# Patient Record
Sex: MALE | Race: WHITE | Employment: FULL TIME | URBAN - NONMETROPOLITAN AREA
[De-identification: names, ages, dates, MRNs, and addresses within clinical notes are randomized per-mention and may not be internally consistent; named-entity substitution may affect disease eponyms.]

---

## 2017-03-02 DIAGNOSIS — E23.0 HYPOGONADOTROPIC HYPOGONADISM SYNDROME, MALE (HCC): Primary | ICD-10-CM

## 2017-03-17 ENCOUNTER — TELEPHONE (OUTPATIENT)
Dept: UROLOGY | Age: 37
End: 2017-03-17

## 2017-03-27 ENCOUNTER — TELEPHONE (OUTPATIENT)
Dept: UROLOGY | Age: 37
End: 2017-03-27

## 2017-03-28 ENCOUNTER — TELEPHONE (OUTPATIENT)
Dept: UROLOGY | Age: 37
End: 2017-03-28

## 2017-04-24 ENCOUNTER — TELEPHONE (OUTPATIENT)
Dept: UROLOGY | Age: 37
End: 2017-04-24

## 2017-04-28 ENCOUNTER — OFFICE VISIT (OUTPATIENT)
Dept: UROLOGY | Age: 37
End: 2017-04-28

## 2017-04-28 VITALS
DIASTOLIC BLOOD PRESSURE: 82 MMHG | HEIGHT: 70 IN | BODY MASS INDEX: 35.79 KG/M2 | WEIGHT: 250 LBS | SYSTOLIC BLOOD PRESSURE: 124 MMHG

## 2017-04-28 DIAGNOSIS — E29.1 HYPOGONADISM IN MALE: Primary | ICD-10-CM

## 2017-04-28 LAB
BILIRUBIN URINE: NEGATIVE
BLOOD URINE, POC: NEGATIVE
CHARACTER, URINE: CLEAR
COLOR, URINE: YELLOW
GLUCOSE URINE: NEGATIVE MG/DL
KETONES, URINE: NEGATIVE
LEUKOCYTE CLUMPS, URINE: NEGATIVE
NITRITE, URINE: NEGATIVE
PH, URINE: 7
PROTEIN, URINE: NEGATIVE MG/DL
SPECIFIC GRAVITY, URINE: 1.02 (ref 1–1.03)
UROBILINOGEN, URINE: 0.2 EU/DL

## 2017-04-28 PROCEDURE — 99213 OFFICE O/P EST LOW 20 MIN: CPT | Performed by: UROLOGY

## 2017-04-28 PROCEDURE — 81003 URINALYSIS AUTO W/O SCOPE: CPT | Performed by: UROLOGY

## 2017-05-30 RX ORDER — TESTOSTERONE GEL, 1% 10 MG/G
GEL TRANSDERMAL
Refills: 0 | OUTPATIENT
Start: 2017-05-30

## 2017-06-01 ENCOUNTER — TELEPHONE (OUTPATIENT)
Dept: UROLOGY | Age: 37
End: 2017-06-01

## 2017-06-06 RX ORDER — TESTOSTERONE GEL, 1% 10 MG/G
GEL TRANSDERMAL
Qty: 30 TUBE | Refills: 5 | Status: SHIPPED | OUTPATIENT
Start: 2017-06-06 | End: 2018-03-07

## 2017-07-03 ENCOUNTER — OFFICE VISIT (OUTPATIENT)
Dept: ENDOCRINOLOGY | Age: 37
End: 2017-07-03

## 2017-07-03 VITALS
HEIGHT: 70 IN | SYSTOLIC BLOOD PRESSURE: 118 MMHG | WEIGHT: 265 LBS | HEART RATE: 112 BPM | RESPIRATION RATE: 18 BRPM | BODY MASS INDEX: 37.94 KG/M2 | DIASTOLIC BLOOD PRESSURE: 80 MMHG

## 2017-07-03 DIAGNOSIS — E29.1 HYPOGONADISM IN MALE: Primary | ICD-10-CM

## 2017-07-03 PROCEDURE — 99204 OFFICE O/P NEW MOD 45 MIN: CPT | Performed by: INTERNAL MEDICINE

## 2017-07-03 RX ORDER — TESTOSTERONE CYPIONATE 200 MG/ML
200 INJECTION INTRAMUSCULAR
Qty: 4 ML | Refills: 1 | Status: SHIPPED | OUTPATIENT
Start: 2017-07-03 | End: 2018-10-02

## 2017-07-20 LAB
ABSOLUTE BASO #: 0 K/UL (ref 0–0.1)
ABSOLUTE EOS #: 0.2 K/UL (ref 0.1–0.4)
ABSOLUTE LYMPH #: 2.1 K/UL (ref 0.8–5.2)
ABSOLUTE MONO #: 0.6 K/UL (ref 0.1–0.9)
ABSOLUTE NEUT #: 4 K/UL (ref 1.3–9.1)
ALBUMIN SERPL-MCNC: 4.7 G/DL (ref 3.2–5.3)
ALK PHOSPHATASE: 79 IU/L (ref 35–121)
ALT SERPL-CCNC: 46 IU/L (ref 5–59)
ANION GAP SERPL CALCULATED.3IONS-SCNC: 11 MMOL/L
AST SERPL-CCNC: 29 IU/L (ref 10–42)
BASOPHILS RELATIVE PERCENT: 0.4 %
BILIRUB SERPL-MCNC: 1.1 MG/DL (ref 0.2–1.3)
BUN BLDV-MCNC: 16 MG/DL (ref 10–20)
CALCIUM SERPL-MCNC: 10.1 MG/DL (ref 8.7–10.8)
CHLORIDE BLD-SCNC: 105 MMOL/L (ref 95–111)
CHOLESTEROL/HDL RATIO: 5.3
CHOLESTEROL: 175 MG/DL
CO2: 25 MMOL/L (ref 21–32)
CREAT SERPL-MCNC: 0.8 MG/DL (ref 0.5–1.3)
EGFR AFRICAN AMERICAN: 132
EGFR IF NONAFRICAN AMERICAN: 109
EOSINOPHILS RELATIVE PERCENT: 2.6 %
FOLLICLE STIMULATING HORMONE: 3.7 MIU/ML
GLUCOSE: 82 MG/DL (ref 70–100)
HCT VFR BLD CALC: 41.1 % (ref 41.4–51)
HDLC SERPL-MCNC: 33 MG/DL (ref 40–60)
HEMOGLOBIN: 14.1 G/DL (ref 13.8–17)
LDL CHOLESTEROL CALCULATED: 66 MG/DL
LDL/HDL RATIO: 2
LH: 1 MIU/ML
LYMPHOCYTE %: 30.8 %
MCH RBC QN AUTO: 27.7 PG (ref 27–34)
MCHC RBC AUTO-ENTMCNC: 34.3 G/DL (ref 31–36)
MCV RBC AUTO: 80.7 FL (ref 80–100)
MONOCYTES # BLD: 8.5 %
NEUTROPHILS RELATIVE PERCENT: 57 %
PDW BLD-RTO: 13 % (ref 10.8–14.8)
PLATELETS: 242 K/UL (ref 150–450)
POTASSIUM SERPL-SCNC: 4.1 MMOL/L (ref 3.5–5.4)
PROLACTIN: 8.2 NG/ML (ref 2.5–17)
PSA, ULTRASENSITIVE: 0.2 NG/ML
RBC: 5.09 M/UL (ref 4–5.5)
SODIUM BLD-SCNC: 137 MMOL/L (ref 134–147)
T4 FREE: 1.01 NG/DL (ref 0.8–1.8)
TOTAL PROTEIN: 7.5 G/DL (ref 5.8–8)
TRIGL SERPL-MCNC: 381 MG/DL
TSH SERPL DL<=0.05 MIU/L-ACNC: 1.9 UIU/ML (ref 0.4–4.4)
VLDLC SERPL CALC-MCNC: 76 MG/DL
WBC: 7 K/UL (ref 3.7–10.8)

## 2017-07-21 LAB — SEX HORMONE BINDING GLOBULIN: 6.7 NMOL/L (ref 16.5–55.9)

## 2017-07-27 LAB — TESTOSTERONE FREE-MALE: 5.1 PG/ML (ref 47–244)

## 2018-02-28 ENCOUNTER — TELEPHONE (OUTPATIENT)
Dept: UROLOGY | Age: 38
End: 2018-02-28

## 2018-02-28 NOTE — TELEPHONE ENCOUNTER
Called and spoke with patient wife (on HIPPA) informed that patient needs an appt first- appt made with [facility] in [place] office.

## 2018-03-07 ENCOUNTER — OFFICE VISIT (OUTPATIENT)
Dept: UROLOGY | Age: 38
End: 2018-03-07
Payer: COMMERCIAL

## 2018-03-07 VITALS
DIASTOLIC BLOOD PRESSURE: 68 MMHG | BODY MASS INDEX: 40.29 KG/M2 | SYSTOLIC BLOOD PRESSURE: 114 MMHG | HEIGHT: 69 IN | WEIGHT: 272 LBS

## 2018-03-07 DIAGNOSIS — E29.1 HYPOGONADISM IN MALE: Primary | ICD-10-CM

## 2018-03-07 LAB
BILIRUBIN URINE: NEGATIVE
BLOOD URINE, POC: NEGATIVE
CHARACTER, URINE: CLEAR
COLOR, URINE: YELLOW
GLUCOSE URINE: NEGATIVE MG/DL
KETONES, URINE: NEGATIVE
LEUKOCYTE CLUMPS, URINE: NEGATIVE
NITRITE, URINE: NEGATIVE
PH, URINE: 5.5
PROTEIN, URINE: NEGATIVE MG/DL
SPECIFIC GRAVITY, URINE: 1.02 (ref 1–1.03)
UROBILINOGEN, URINE: 0.2 EU/DL

## 2018-03-07 PROCEDURE — 81003 URINALYSIS AUTO W/O SCOPE: CPT | Performed by: NURSE PRACTITIONER

## 2018-03-07 PROCEDURE — 99213 OFFICE O/P EST LOW 20 MIN: CPT | Performed by: NURSE PRACTITIONER

## 2018-03-07 RX ORDER — TESTOSTERONE 16.2 MG/G
1 GEL TRANSDERMAL DAILY
Qty: 1 BOTTLE | Refills: 3 | Status: SHIPPED | OUTPATIENT
Start: 2018-03-07 | End: 2018-03-20

## 2018-03-07 ASSESSMENT — ENCOUNTER SYMPTOMS
NAUSEA: 0
ABDOMINAL PAIN: 0
VOMITING: 0

## 2018-03-08 ENCOUNTER — TELEPHONE (OUTPATIENT)
Dept: UROLOGY | Age: 38
End: 2018-03-08

## 2018-03-08 NOTE — TELEPHONE ENCOUNTER
Parris Nair called stating his insurance is requiring a prior authorization for the testosterone gel and asked if someone could inform him when this has been approved. Thank you!

## 2018-03-09 NOTE — TELEPHONE ENCOUNTER
Sandoval Dress calls to see if there has been any response or movement on this prior authorization. Please contact patient.

## 2018-03-14 NOTE — TELEPHONE ENCOUNTER
PA initiated again due to [employer] [employer] canceled transaction due to inactivity. Answers on the covermymed page must be answered within 24 hours. I was on vacation during this time.

## 2018-03-19 ENCOUNTER — TELEPHONE (OUTPATIENT)
Dept: UROLOGY | Age: 38
End: 2018-03-19

## 2018-03-19 NOTE — TELEPHONE ENCOUNTER
Patient is calling back in about his injections, please advise at [phone], please leave message if no answer

## 2018-03-20 ENCOUNTER — TELEPHONE (OUTPATIENT)
Dept: UROLOGY | Age: 38
End: 2018-03-20

## 2018-03-20 RX ORDER — TESTOSTERONE CYPIONATE 200 MG/ML
100 INJECTION INTRAMUSCULAR
Qty: 2 ML | Refills: 5 | Status: SHIPPED | OUTPATIENT
Start: 2018-03-20 | End: 2018-10-01 | Stop reason: SDUPTHER

## 2018-03-20 RX ORDER — SYRINGE W-NEEDLE,DISPOSAB,3 ML 25GX5/8"
1 SYRINGE, EMPTY DISPOSABLE MISCELLANEOUS
Qty: 6 EACH | Refills: 1 | Status: SHIPPED | OUTPATIENT
Start: 2018-03-20 | End: 2019-04-25

## 2018-03-20 RX ORDER — SYRINGE W-NEEDLE,DISPOSAB,3 ML 25GX5/8"
1 SYRINGE, EMPTY DISPOSABLE MISCELLANEOUS
Qty: 6 EACH | Refills: 1 | Status: SHIPPED | OUTPATIENT
Start: 2018-03-20 | End: 2018-03-20

## 2018-03-20 NOTE — TELEPHONE ENCOUNTER
I accidentally sent to [employer] because that was in here. Please call there and cancel. I sent to [facility].

## 2018-03-20 NOTE — TELEPHONE ENCOUNTER
[facility],    Patient is asking for prescription for testosterone injections. He would like it sent to [address] [place] [address] in [place]. Please advise, thank you.

## 2018-05-24 ENCOUNTER — HOSPITAL ENCOUNTER (OUTPATIENT)
Age: 38
Discharge: HOME | End: 2018-05-24
Payer: COMMERCIAL

## 2018-05-24 DIAGNOSIS — E29.1 HYPOGONADISM IN MALE: ICD-10-CM

## 2018-05-24 LAB
HCT VFR BLD CALC: 42 % (ref 42–52)
HEMOGLOBIN: 14.4 GM/DL (ref 14–18)
PROSTATE SPECIFIC ANTIGEN: 0.54 NG/ML (ref 0–1)

## 2018-05-24 PROCEDURE — 84403 ASSAY OF TOTAL TESTOSTERONE: CPT

## 2018-05-24 PROCEDURE — 84153 ASSAY OF PSA TOTAL: CPT

## 2018-05-24 PROCEDURE — 36415 COLL VENOUS BLD VENIPUNCTURE: CPT

## 2018-05-24 PROCEDURE — 85018 HEMOGLOBIN: CPT

## 2018-05-24 PROCEDURE — 85014 HEMATOCRIT: CPT

## 2018-05-24 PROCEDURE — 84270 ASSAY OF SEX HORMONE GLOBUL: CPT

## 2018-05-25 LAB — TESTOSTERONE FREE: NORMAL

## 2018-05-31 ENCOUNTER — OFFICE VISIT (OUTPATIENT)
Dept: UROLOGY | Age: 38
End: 2018-05-31
Payer: COMMERCIAL

## 2018-05-31 VITALS
SYSTOLIC BLOOD PRESSURE: 128 MMHG | BODY MASS INDEX: 39.3 KG/M2 | HEIGHT: 70 IN | DIASTOLIC BLOOD PRESSURE: 76 MMHG | WEIGHT: 274.5 LBS

## 2018-05-31 DIAGNOSIS — E29.1 HYPOGONADISM IN MALE: Primary | ICD-10-CM

## 2018-05-31 LAB
BILIRUBIN URINE: NEGATIVE
BLOOD URINE, POC: NEGATIVE
CHARACTER, URINE: CLEAR
COLOR, URINE: YELLOW
GLUCOSE URINE: NEGATIVE MG/DL
KETONES, URINE: ABNORMAL
LEUKOCYTE CLUMPS, URINE: NEGATIVE
NITRITE, URINE: NEGATIVE
PH, URINE: 5
PROTEIN, URINE: 100 MG/DL
SPECIFIC GRAVITY, URINE: >= 1.03 (ref 1–1.03)
UROBILINOGEN, URINE: 0.2 EU/DL

## 2018-05-31 PROCEDURE — 99213 OFFICE O/P EST LOW 20 MIN: CPT | Performed by: NURSE PRACTITIONER

## 2018-05-31 PROCEDURE — 81003 URINALYSIS AUTO W/O SCOPE: CPT | Performed by: NURSE PRACTITIONER

## 2018-05-31 ASSESSMENT — ENCOUNTER SYMPTOMS
VOMITING: 0
ABDOMINAL PAIN: 0
NAUSEA: 0

## 2018-08-16 ENCOUNTER — OFFICE VISIT (OUTPATIENT)
Dept: FAMILY MEDICINE CLINIC | Age: 38
End: 2018-08-16
Payer: COMMERCIAL

## 2018-08-16 VITALS
HEIGHT: 71 IN | WEIGHT: 273 LBS | SYSTOLIC BLOOD PRESSURE: 126 MMHG | DIASTOLIC BLOOD PRESSURE: 62 MMHG | OXYGEN SATURATION: 97 % | HEART RATE: 88 BPM | BODY MASS INDEX: 38.22 KG/M2

## 2018-08-16 DIAGNOSIS — E23.0 HYPOGONADOTROPIC HYPOGONADISM SYNDROME, MALE (HCC): Primary | ICD-10-CM

## 2018-08-16 DIAGNOSIS — E78.2 MIXED HYPERLIPIDEMIA: ICD-10-CM

## 2018-08-16 PROCEDURE — 99204 OFFICE O/P NEW MOD 45 MIN: CPT | Performed by: FAMILY MEDICINE

## 2018-08-16 PROCEDURE — 1036F TOBACCO NON-USER: CPT | Performed by: FAMILY MEDICINE

## 2018-08-16 PROCEDURE — G8417 CALC BMI ABV UP PARAM F/U: HCPCS | Performed by: FAMILY MEDICINE

## 2018-08-16 PROCEDURE — G8427 DOCREV CUR MEDS BY ELIG CLIN: HCPCS | Performed by: FAMILY MEDICINE

## 2018-08-16 ASSESSMENT — ENCOUNTER SYMPTOMS
CONSTIPATION: 0
WHEEZING: 0
ABDOMINAL PAIN: 0
RHINORRHEA: 0
DIARRHEA: 0
SORE THROAT: 0
EYE DISCHARGE: 0
COUGH: 0
SHORTNESS OF BREATH: 0
NAUSEA: 0

## 2018-08-16 ASSESSMENT — PATIENT HEALTH QUESTIONNAIRE - PHQ9
2. FEELING DOWN, DEPRESSED OR HOPELESS: 0
SUM OF ALL RESPONSES TO PHQ9 QUESTIONS 1 & 2: 0
SUM OF ALL RESPONSES TO PHQ QUESTIONS 1-9: 0
1. LITTLE INTEREST OR PLEASURE IN DOING THINGS: 0
SUM OF ALL RESPONSES TO PHQ QUESTIONS 1-9: 0

## 2018-08-16 NOTE — PROGRESS NOTES
Juli Garcia  [address] [place] [address]  Dept: [phone]  Dept Fax: [phone]  Loc: [phone]    Isac Arreola is a 41 y.o. male who presents today for his medical conditions/complaints as noted below. Chief Complaint   Patient presents with    New Patient           HPI:     Patient presents to [facility]. Has history of low testosterone and sees [facility] for this. Is on testosterone injections. States this is tolerated well. Patient also has a history of elevated triglycerides but has not been checked in over a year and is never been on medication for it. Denies any chest pain or shortness of breath. Denies any depression or anxiety. Has 6 adopted children. States he feels well without complaints today. Past Medical History:   Diagnosis Date    Azoospermia     Hypogonadism       No past surgical history on file. Family History   Problem Relation Age of Onset    Cancer Mother         hodgkins    No Known Problems Father        Social History   Substance Use Topics    Smoking status: Never Smoker    Smokeless tobacco: Never Used    Alcohol use No      Current Outpatient Prescriptions   Medication Sig Dispense Refill    testosterone cypionate (DEPOTESTOTERONE CYPIONATE) 200 MG/ML injection Inject 0.5 mLs into the muscle every 14 days. 2 mL 5    Syringe/Needle, Disp, (SYRINGE 3CC/25GX1\") 25G X 1\" 3 ML MISC 1 each by Does not apply route every 14 days 6 each 1    testosterone cypionate (DEPOTESTOTERONE CYPIONATE) 200 MG/ML injection Inject 1 mL into the muscle every 14 days First injection due on 7/10/17 4 mL 1    Needles & Syringes MISC 1 each by Does not apply route daily 3 ml syringes 20 each 1     No current facility-administered medications for this visit.       No Known Allergies    Health Maintenance   Topic Date Due    HIV screen  04/08/1996    DTaP/Tdap/Td vaccine (1 - Tdap) 04/08/2000    Flu vaccine (1) 09/01/2018       Subjective:      Review of Systems   Constitutional: Negative for chills, fatigue and fever. HENT: Negative for congestion, rhinorrhea and sore throat. Eyes: Negative for discharge and visual disturbance. Respiratory: Negative for cough, shortness of breath and wheezing. Cardiovascular: Negative for chest pain and palpitations. Gastrointestinal: Negative for abdominal pain, constipation, diarrhea and nausea. Genitourinary: Negative for dysuria and hematuria. Musculoskeletal: Negative for arthralgias and myalgias. Neurological: Negative for dizziness and headaches. Psychiatric/Behavioral: Negative for sleep disturbance. The patient is not nervous/anxious. Objective:     Physical Exam   Constitutional: He is oriented to person, place, and time. He appears well-developed and well-nourished. No distress. HENT:   Head: Normocephalic and atraumatic. Right Ear: Hearing, tympanic membrane, external ear and ear canal normal.   Left Ear: Hearing, tympanic membrane, external ear and ear canal normal.   Nose: Right sinus exhibits no maxillary sinus tenderness and no frontal sinus tenderness. Left sinus exhibits no maxillary sinus tenderness and no frontal sinus tenderness. Mouth/Throat: Oropharynx is clear and moist and mucous membranes are normal. No oropharyngeal exudate, posterior oropharyngeal edema or posterior oropharyngeal erythema. Eyes: Pupils are equal, round, and reactive to light. Conjunctivae and EOM are normal. Right eye exhibits no discharge. Left eye exhibits no discharge. No scleral icterus. Neck: Normal range of motion. Neck supple. Cardiovascular: Normal rate, regular rhythm, normal heart sounds and intact distal pulses. Pulmonary/Chest: Effort normal and breath sounds normal. No respiratory distress. He has no wheezes. Abdominal: Soft. Bowel sounds are normal. He exhibits no distension. There is no tenderness. Patient given educational materials - see patient instructions. Discussed use, benefit, and side effects of prescribed medications. All patient questions answered. Pt voiced understanding. Reviewed health maintenance. Instructed to continue current medications, diet and exercise. Patient agreed with treatment plan. Follow up as directed.      Electronically signed by Sloane Javier MD on 8/16/2018 at 8:52 AM

## 2018-10-01 DIAGNOSIS — E29.1 HYPOGONADISM IN MALE: Primary | ICD-10-CM

## 2018-10-02 RX ORDER — TESTOSTERONE CYPIONATE 200 MG/ML
100 INJECTION INTRAMUSCULAR
Qty: 2 ML | Refills: 3 | Status: SHIPPED | OUTPATIENT
Start: 2018-10-02 | End: 2019-04-24 | Stop reason: SDUPTHER

## 2018-11-30 ENCOUNTER — HOSPITAL ENCOUNTER (OUTPATIENT)
Age: 38
Discharge: HOME | End: 2018-11-30
Payer: COMMERCIAL

## 2018-11-30 DIAGNOSIS — E29.1 HYPOGONADISM IN MALE: ICD-10-CM

## 2018-11-30 LAB
HCT VFR BLD CALC: 42.3 % (ref 42–52)
HEMOGLOBIN: 14.1 GM/DL (ref 14–18)

## 2018-11-30 PROCEDURE — 85018 HEMOGLOBIN: CPT

## 2018-11-30 PROCEDURE — 36415 COLL VENOUS BLD VENIPUNCTURE: CPT

## 2018-11-30 PROCEDURE — 84403 ASSAY OF TOTAL TESTOSTERONE: CPT

## 2018-11-30 PROCEDURE — 85014 HEMATOCRIT: CPT

## 2018-12-02 LAB — TESTOSTERONE TOTAL: 57 NG/DL (ref 300–1080)

## 2018-12-14 ENCOUNTER — OFFICE VISIT (OUTPATIENT)
Dept: UROLOGY | Age: 38
End: 2018-12-14
Payer: COMMERCIAL

## 2018-12-14 VITALS
BODY MASS INDEX: 38.07 KG/M2 | DIASTOLIC BLOOD PRESSURE: 78 MMHG | SYSTOLIC BLOOD PRESSURE: 118 MMHG | HEIGHT: 70 IN | WEIGHT: 265.9 LBS

## 2018-12-14 DIAGNOSIS — E23.0 HYPOGONADOTROPIC HYPOGONADISM SYNDROME, MALE (HCC): Primary | ICD-10-CM

## 2018-12-14 PROCEDURE — G8484 FLU IMMUNIZE NO ADMIN: HCPCS | Performed by: NURSE PRACTITIONER

## 2018-12-14 PROCEDURE — 99213 OFFICE O/P EST LOW 20 MIN: CPT | Performed by: NURSE PRACTITIONER

## 2018-12-14 PROCEDURE — 81003 URINALYSIS AUTO W/O SCOPE: CPT | Performed by: NURSE PRACTITIONER

## 2018-12-14 PROCEDURE — G8427 DOCREV CUR MEDS BY ELIG CLIN: HCPCS | Performed by: NURSE PRACTITIONER

## 2018-12-14 PROCEDURE — 1036F TOBACCO NON-USER: CPT | Performed by: NURSE PRACTITIONER

## 2018-12-14 PROCEDURE — G8417 CALC BMI ABV UP PARAM F/U: HCPCS | Performed by: NURSE PRACTITIONER

## 2018-12-14 ASSESSMENT — ENCOUNTER SYMPTOMS
VOMITING: 0
NAUSEA: 0
ABDOMINAL PAIN: 0

## 2018-12-14 NOTE — PROGRESS NOTES
Urinalysis No Micro (Auto)   Result Value Ref Range    Glucose, Ur Negative NEGATIVE mg/dl    Bilirubin Urine Negative     Ketones, Urine Negative NEGATIVE    Specific Gravity, Urine 1.025 1.002 - 1.03    Blood, UA POC Negative NEGATIVE    pH, Urine 5.50 5.0 - 9.0    Protein, Urine Negative NEGATIVE mg/dl    Urobilinogen, Urine 0.20 0.0 - 1.0 eu/dl    Nitrite, Urine Negative NEGATIVE    Leukocyte Clumps, Urine Negative NEGATIVE    Color, Urine Yellow YELLOW-STR    Character, Urine Clear [id number]       Assessment:      Hypogonadism      Plan:      Testosterone level is very low. He reports that he often forgets to do the injection and is not on a good schedule. He wishes to do the androgel or testim like he was on previously, however, his insurance has refused to cover these. He is getting new insurance in 2019 and will check if testim is covered and let us know. Continue testosterone 0.5 ml (100 mg) every 2 weeks. Follow-up in 6 months with labs prior.

## 2019-01-04 ENCOUNTER — APPOINTMENT (OUTPATIENT)
Dept: CT IMAGING | Age: 39
DRG: 871 | End: 2019-01-04
Payer: COMMERCIAL

## 2019-01-04 ENCOUNTER — HOSPITAL ENCOUNTER (INPATIENT)
Age: 39
LOS: 2 days | Discharge: HOME | DRG: 871 | End: 2019-01-06
Attending: EMERGENCY MEDICINE | Admitting: INTERNAL MEDICINE
Payer: COMMERCIAL

## 2019-01-04 ENCOUNTER — APPOINTMENT (OUTPATIENT)
Dept: INTERVENTIONAL RADIOLOGY/VASCULAR | Age: 39
DRG: 871 | End: 2019-01-04
Payer: COMMERCIAL

## 2019-01-04 ENCOUNTER — APPOINTMENT (OUTPATIENT)
Dept: GENERAL RADIOLOGY | Age: 39
DRG: 871 | End: 2019-01-04
Payer: COMMERCIAL

## 2019-01-04 DIAGNOSIS — J18.9 PNEUMONIA DUE TO ORGANISM: Primary | ICD-10-CM

## 2019-01-04 DIAGNOSIS — R06.00 DYSPNEA, UNSPECIFIED TYPE: ICD-10-CM

## 2019-01-04 DIAGNOSIS — I26.99 OTHER ACUTE PULMONARY EMBOLISM WITHOUT ACUTE COR PULMONALE (HCC): ICD-10-CM

## 2019-01-04 DIAGNOSIS — R00.0 TACHYCARDIA: ICD-10-CM

## 2019-01-04 LAB
ANION GAP SERPL CALCULATED.3IONS-SCNC: 14 MEQ/L (ref 8–16)
BASOPHILS # BLD: 0.4 %
BASOPHILS ABSOLUTE: 0 THOU/MM3 (ref 0–0.1)
BUN BLDV-MCNC: 9 MG/DL (ref 7–22)
CALCIUM SERPL-MCNC: 9.6 MG/DL (ref 8.5–10.5)
CHLORIDE BLD-SCNC: 101 MEQ/L (ref 98–111)
CO2: 26 MEQ/L (ref 23–33)
CREAT SERPL-MCNC: 0.9 MG/DL (ref 0.4–1.2)
D-DIMER QUANTITATIVE: 792 NG/ML FEU (ref 0–500)
EKG ATRIAL RATE: 122 BPM
EKG P AXIS: 52 DEGREES
EKG P-R INTERVAL: 138 MS
EKG Q-T INTERVAL: 324 MS
EKG QRS DURATION: 100 MS
EKG QTC CALCULATION (BAZETT): 461 MS
EKG R AXIS: 38 DEGREES
EKG T AXIS: 59 DEGREES
EKG VENTRICULAR RATE: 122 BPM
EOSINOPHIL # BLD: 1.2 %
EOSINOPHILS ABSOLUTE: 0.1 THOU/MM3 (ref 0–0.4)
ERYTHROCYTE [DISTWIDTH] IN BLOOD BY AUTOMATED COUNT: 12.5 % (ref 11.5–14.5)
ERYTHROCYTE [DISTWIDTH] IN BLOOD BY AUTOMATED COUNT: 37.5 FL (ref 35–45)
FLU A ANTIGEN: NEGATIVE
FLU B ANTIGEN: NEGATIVE
GFR SERPL CREATININE-BSD FRML MDRD: > 90 ML/MIN/1.73M2
GLUCOSE BLD-MCNC: 114 MG/DL (ref 70–108)
GROUP A STREP CULTURE, REFLEX: NEGATIVE
HCT VFR BLD CALC: 41.3 % (ref 42–52)
HEMOGLOBIN: 13.9 GM/DL (ref 14–18)
IMMATURE GRANS (ABS): 0.05 THOU/MM3 (ref 0–0.07)
IMMATURE GRANULOCYTES: 0.5 %
LACTIC ACID: 1.9 MMOL/L (ref 0.5–2.2)
LYMPHOCYTES # BLD: 12.5 %
LYMPHOCYTES ABSOLUTE: 1.2 THOU/MM3 (ref 1–4.8)
MCH RBC QN AUTO: 27.7 PG (ref 26–33)
MCHC RBC AUTO-ENTMCNC: 33.7 GM/DL (ref 32.2–35.5)
MCV RBC AUTO: 82.4 FL (ref 80–94)
MONOCYTES # BLD: 12.5 %
MONOCYTES ABSOLUTE: 1.2 THOU/MM3 (ref 0.4–1.3)
NUCLEATED RED BLOOD CELLS: 0 /100 WBC
OSMOLALITY CALCULATION: 280.8 MOSMOL/KG (ref 275–300)
PLATELET # BLD: 264 THOU/MM3 (ref 130–400)
PMV BLD AUTO: 9.4 FL (ref 9.4–12.4)
POTASSIUM SERPL-SCNC: 3.4 MEQ/L (ref 3.5–5.2)
RBC # BLD: 5.01 MILL/MM3 (ref 4.7–6.1)
REFLEX THROAT C + S: NORMAL
SEG NEUTROPHILS: 72.9 %
SEGMENTED NEUTROPHILS ABSOLUTE COUNT: 7.1 THOU/MM3 (ref 1.8–7.7)
SODIUM BLD-SCNC: 141 MEQ/L (ref 135–145)
WBC # BLD: 9.7 THOU/MM3 (ref 4.8–10.8)

## 2019-01-04 PROCEDURE — 87880 STREP A ASSAY W/OPTIC: CPT

## 2019-01-04 PROCEDURE — 87804 INFLUENZA ASSAY W/OPTIC: CPT

## 2019-01-04 PROCEDURE — 71046 X-RAY EXAM CHEST 2 VIEWS: CPT

## 2019-01-04 PROCEDURE — 2709999900 HC NON-CHARGEABLE SUPPLY

## 2019-01-04 PROCEDURE — 2580000003 HC RX 258: Performed by: EMERGENCY MEDICINE

## 2019-01-04 PROCEDURE — 87070 CULTURE OTHR SPECIMN AEROBIC: CPT

## 2019-01-04 PROCEDURE — 93010 ELECTROCARDIOGRAM REPORT: CPT | Performed by: INTERNAL MEDICINE

## 2019-01-04 PROCEDURE — 87040 BLOOD CULTURE FOR BACTERIA: CPT

## 2019-01-04 PROCEDURE — 83605 ASSAY OF LACTIC ACID: CPT

## 2019-01-04 PROCEDURE — 2700000000 HC OXYGEN THERAPY PER DAY

## 2019-01-04 PROCEDURE — 2580000003 HC RX 258: Performed by: INTERNAL MEDICINE

## 2019-01-04 PROCEDURE — 2060000000 HC ICU INTERMEDIATE R&B

## 2019-01-04 PROCEDURE — 94640 AIRWAY INHALATION TREATMENT: CPT

## 2019-01-04 PROCEDURE — 6360000002 HC RX W HCPCS: Performed by: INTERNAL MEDICINE

## 2019-01-04 PROCEDURE — 94760 N-INVAS EAR/PLS OXIMETRY 1: CPT

## 2019-01-04 PROCEDURE — 87449 NOS EACH ORGANISM AG IA: CPT

## 2019-01-04 PROCEDURE — 80048 BASIC METABOLIC PNL TOTAL CA: CPT

## 2019-01-04 PROCEDURE — 6370000000 HC RX 637 (ALT 250 FOR IP): Performed by: EMERGENCY MEDICINE

## 2019-01-04 PROCEDURE — 87899 AGENT NOS ASSAY W/OPTIC: CPT

## 2019-01-04 PROCEDURE — 85379 FIBRIN DEGRADATION QUANT: CPT

## 2019-01-04 PROCEDURE — 71275 CT ANGIOGRAPHY CHEST: CPT

## 2019-01-04 PROCEDURE — 36415 COLL VENOUS BLD VENIPUNCTURE: CPT

## 2019-01-04 PROCEDURE — 85025 COMPLETE CBC W/AUTO DIFF WBC: CPT

## 2019-01-04 PROCEDURE — 93005 ELECTROCARDIOGRAM TRACING: CPT | Performed by: PHYSICIAN ASSISTANT

## 2019-01-04 PROCEDURE — 93970 EXTREMITY STUDY: CPT

## 2019-01-04 PROCEDURE — 6360000002 HC RX W HCPCS: Performed by: EMERGENCY MEDICINE

## 2019-01-04 PROCEDURE — 96365 THER/PROPH/DIAG IV INF INIT: CPT

## 2019-01-04 PROCEDURE — 99285 EMERGENCY DEPT VISIT HI MDM: CPT

## 2019-01-04 PROCEDURE — 6360000004 HC RX CONTRAST MEDICATION: Performed by: EMERGENCY MEDICINE

## 2019-01-04 RX ORDER — SODIUM CHLORIDE 9 MG/ML
INJECTION, SOLUTION INTRAVENOUS CONTINUOUS
Status: DISCONTINUED | OUTPATIENT
Start: 2019-01-04 | End: 2019-01-05

## 2019-01-04 RX ORDER — ACETAMINOPHEN 325 MG/1
650 TABLET ORAL EVERY 4 HOURS PRN
Status: DISCONTINUED | OUTPATIENT
Start: 2019-01-04 | End: 2019-01-04 | Stop reason: SDUPTHER

## 2019-01-04 RX ORDER — ONDANSETRON 2 MG/ML
4 INJECTION INTRAMUSCULAR; INTRAVENOUS EVERY 6 HOURS PRN
Status: DISCONTINUED | OUTPATIENT
Start: 2019-01-04 | End: 2019-01-06 | Stop reason: HOSPADM

## 2019-01-04 RX ORDER — IPRATROPIUM BROMIDE AND ALBUTEROL SULFATE 2.5; .5 MG/3ML; MG/3ML
1 SOLUTION RESPIRATORY (INHALATION) ONCE
Status: COMPLETED | OUTPATIENT
Start: 2019-01-04 | End: 2019-01-04

## 2019-01-04 RX ORDER — ALBUTEROL SULFATE 2.5 MG/3ML
2.5 SOLUTION RESPIRATORY (INHALATION) 4 TIMES DAILY
Status: DISCONTINUED | OUTPATIENT
Start: 2019-01-04 | End: 2019-01-06 | Stop reason: HOSPADM

## 2019-01-04 RX ORDER — LEVOFLOXACIN 5 MG/ML
500 INJECTION, SOLUTION INTRAVENOUS ONCE
Status: COMPLETED | OUTPATIENT
Start: 2019-01-04 | End: 2019-01-04

## 2019-01-04 RX ORDER — PANTOPRAZOLE SODIUM 40 MG/1
40 TABLET, DELAYED RELEASE ORAL
Status: DISCONTINUED | OUTPATIENT
Start: 2019-01-05 | End: 2019-01-06 | Stop reason: HOSPADM

## 2019-01-04 RX ORDER — SODIUM CHLORIDE 0.9 % (FLUSH) 0.9 %
10 SYRINGE (ML) INJECTION PRN
Status: DISCONTINUED | OUTPATIENT
Start: 2019-01-04 | End: 2019-01-06 | Stop reason: HOSPADM

## 2019-01-04 RX ORDER — ACETAMINOPHEN 325 MG/1
650 TABLET ORAL EVERY 4 HOURS PRN
Status: DISCONTINUED | OUTPATIENT
Start: 2019-01-04 | End: 2019-01-06 | Stop reason: HOSPADM

## 2019-01-04 RX ORDER — POTASSIUM CHLORIDE 20 MEQ/1
40 TABLET, EXTENDED RELEASE ORAL ONCE
Status: COMPLETED | OUTPATIENT
Start: 2019-01-04 | End: 2019-01-04

## 2019-01-04 RX ORDER — SODIUM CHLORIDE 0.9 % (FLUSH) 0.9 %
10 SYRINGE (ML) INJECTION EVERY 12 HOURS SCHEDULED
Status: DISCONTINUED | OUTPATIENT
Start: 2019-01-04 | End: 2019-01-06 | Stop reason: HOSPADM

## 2019-01-04 RX ORDER — 0.9 % SODIUM CHLORIDE 0.9 %
500 INTRAVENOUS SOLUTION INTRAVENOUS ONCE
Status: COMPLETED | OUTPATIENT
Start: 2019-01-04 | End: 2019-01-04

## 2019-01-04 RX ORDER — POTASSIUM CHLORIDE 20 MEQ/1
20 TABLET, EXTENDED RELEASE ORAL ONCE
Status: COMPLETED | OUTPATIENT
Start: 2019-01-04 | End: 2019-01-04

## 2019-01-04 RX ORDER — ALBUTEROL SULFATE 2.5 MG/3ML
2.5 SOLUTION RESPIRATORY (INHALATION) EVERY 4 HOURS PRN
Status: DISCONTINUED | OUTPATIENT
Start: 2019-01-04 | End: 2019-01-06 | Stop reason: HOSPADM

## 2019-01-04 RX ORDER — IBUPROFEN 200 MG
600 TABLET ORAL EVERY 8 HOURS PRN
Status: ON HOLD | COMMUNITY
End: 2019-01-06 | Stop reason: HOSPADM

## 2019-01-04 RX ORDER — IBUPROFEN 200 MG
400 TABLET ORAL ONCE
Status: COMPLETED | OUTPATIENT
Start: 2019-01-04 | End: 2019-01-04

## 2019-01-04 RX ADMIN — IOPAMIDOL 80 ML: 755 INJECTION, SOLUTION INTRAVENOUS at 15:22

## 2019-01-04 RX ADMIN — LEVOFLOXACIN 500 MG: 5 INJECTION, SOLUTION INTRAVENOUS at 13:59

## 2019-01-04 RX ADMIN — POTASSIUM CHLORIDE 20 MEQ: 1500 TABLET, EXTENDED RELEASE ORAL at 13:13

## 2019-01-04 RX ADMIN — AZITHROMYCIN MONOHYDRATE 500 MG: 500 INJECTION, POWDER, LYOPHILIZED, FOR SOLUTION INTRAVENOUS at 20:41

## 2019-01-04 RX ADMIN — POTASSIUM CHLORIDE 20 MEQ: 1500 TABLET, EXTENDED RELEASE ORAL at 13:27

## 2019-01-04 RX ADMIN — SODIUM CHLORIDE: 9 INJECTION, SOLUTION INTRAVENOUS at 17:30

## 2019-01-04 RX ADMIN — ENOXAPARIN SODIUM 120 MG: 120 INJECTION SUBCUTANEOUS at 17:50

## 2019-01-04 RX ADMIN — Medication 2 PUFF: at 13:37

## 2019-01-04 RX ADMIN — Medication 10 ML: at 20:41

## 2019-01-04 RX ADMIN — IBUPROFEN 400 MG: 200 TABLET, FILM COATED ORAL at 15:33

## 2019-01-04 RX ADMIN — ONDANSETRON 4 MG: 2 INJECTION INTRAMUSCULAR; INTRAVENOUS at 21:59

## 2019-01-04 RX ADMIN — IPRATROPIUM BROMIDE AND ALBUTEROL SULFATE 1 AMPULE: .5; 3 SOLUTION RESPIRATORY (INHALATION) at 12:28

## 2019-01-04 RX ADMIN — SODIUM CHLORIDE 500 ML: 9 INJECTION, SOLUTION INTRAVENOUS at 12:39

## 2019-01-04 ASSESSMENT — ENCOUNTER SYMPTOMS
EYE REDNESS: 0
BACK PAIN: 0
COUGH: 1
EYE DISCHARGE: 0
SHORTNESS OF BREATH: 1
WHEEZING: 0
ABDOMINAL PAIN: 0
RHINORRHEA: 0
DIARRHEA: 1
VOMITING: 0
NAUSEA: 0
SORE THROAT: 0

## 2019-01-04 ASSESSMENT — PAIN DESCRIPTION - DESCRIPTORS: DESCRIPTORS: ACHING

## 2019-01-04 ASSESSMENT — PAIN SCALES - GENERAL
PAINLEVEL_OUTOF10: 6
PAINLEVEL_OUTOF10: 0

## 2019-01-04 ASSESSMENT — PAIN DESCRIPTION - PAIN TYPE: TYPE: ACUTE PAIN

## 2019-01-04 ASSESSMENT — PAIN DESCRIPTION - LOCATION: LOCATION: GENERALIZED

## 2019-01-05 LAB
ALBUMIN SERPL-MCNC: 3.3 G/DL (ref 3.5–5.1)
ALP BLD-CCNC: 64 U/L (ref 38–126)
ALT SERPL-CCNC: 28 U/L (ref 11–66)
ANION GAP SERPL CALCULATED.3IONS-SCNC: 14 MEQ/L (ref 8–16)
AST SERPL-CCNC: 17 U/L (ref 5–40)
BASOPHILS # BLD: 0.3 %
BASOPHILS ABSOLUTE: 0 THOU/MM3 (ref 0–0.1)
BILIRUB SERPL-MCNC: 0.5 MG/DL (ref 0.3–1.2)
BUN BLDV-MCNC: 8 MG/DL (ref 7–22)
CALCIUM SERPL-MCNC: 8.7 MG/DL (ref 8.5–10.5)
CHLORIDE BLD-SCNC: 100 MEQ/L (ref 98–111)
CO2: 23 MEQ/L (ref 23–33)
CREAT SERPL-MCNC: 0.9 MG/DL (ref 0.4–1.2)
EOSINOPHIL # BLD: 0.7 %
EOSINOPHILS ABSOLUTE: 0.1 THOU/MM3 (ref 0–0.4)
ERYTHROCYTE [DISTWIDTH] IN BLOOD BY AUTOMATED COUNT: 12.4 % (ref 11.5–14.5)
ERYTHROCYTE [DISTWIDTH] IN BLOOD BY AUTOMATED COUNT: 37.9 FL (ref 35–45)
GFR SERPL CREATININE-BSD FRML MDRD: > 90 ML/MIN/1.73M2
GLUCOSE BLD-MCNC: 122 MG/DL (ref 70–108)
HCT VFR BLD CALC: 35.8 % (ref 42–52)
HEMOGLOBIN: 11.9 GM/DL (ref 14–18)
IMMATURE GRANS (ABS): 0.05 THOU/MM3 (ref 0–0.07)
IMMATURE GRANULOCYTES: 0.5 %
LACTIC ACID: 1.3 MMOL/L (ref 0.5–2.2)
LV EF: 60 %
LVEF MODALITY: NORMAL
LYMPHOCYTES # BLD: 13.7 %
LYMPHOCYTES ABSOLUTE: 1.3 THOU/MM3 (ref 1–4.8)
MCH RBC QN AUTO: 27.5 PG (ref 26–33)
MCHC RBC AUTO-ENTMCNC: 33.2 GM/DL (ref 32.2–35.5)
MCV RBC AUTO: 82.9 FL (ref 80–94)
MONOCYTES # BLD: 13.4 %
MONOCYTES ABSOLUTE: 1.3 THOU/MM3 (ref 0.4–1.3)
NUCLEATED RED BLOOD CELLS: 0 /100 WBC
OSMOLALITY CALCULATION: 273.5 MOSMOL/KG (ref 275–300)
PLATELET # BLD: 252 THOU/MM3 (ref 130–400)
PMV BLD AUTO: 9.4 FL (ref 9.4–12.4)
POTASSIUM SERPL-SCNC: 3.5 MEQ/L (ref 3.5–5.2)
RBC # BLD: 4.32 MILL/MM3 (ref 4.7–6.1)
SEG NEUTROPHILS: 71.4 %
SEGMENTED NEUTROPHILS ABSOLUTE COUNT: 6.9 THOU/MM3 (ref 1.8–7.7)
SODIUM BLD-SCNC: 137 MEQ/L (ref 135–145)
TOTAL PROTEIN: 6.8 G/DL (ref 6.1–8)
WBC # BLD: 9.6 THOU/MM3 (ref 4.8–10.8)

## 2019-01-05 PROCEDURE — 6370000000 HC RX 637 (ALT 250 FOR IP): Performed by: EMERGENCY MEDICINE

## 2019-01-05 PROCEDURE — 6360000002 HC RX W HCPCS: Performed by: INTERNAL MEDICINE

## 2019-01-05 PROCEDURE — 2060000000 HC ICU INTERMEDIATE R&B

## 2019-01-05 PROCEDURE — 2580000003 HC RX 258: Performed by: INTERNAL MEDICINE

## 2019-01-05 PROCEDURE — 99255 IP/OBS CONSLTJ NEW/EST HI 80: CPT | Performed by: INTERNAL MEDICINE

## 2019-01-05 PROCEDURE — 2709999900 HC NON-CHARGEABLE SUPPLY

## 2019-01-05 PROCEDURE — 93306 TTE W/DOPPLER COMPLETE: CPT

## 2019-01-05 PROCEDURE — 6370000000 HC RX 637 (ALT 250 FOR IP): Performed by: INTERNAL MEDICINE

## 2019-01-05 PROCEDURE — 80053 COMPREHEN METABOLIC PANEL: CPT

## 2019-01-05 PROCEDURE — 83605 ASSAY OF LACTIC ACID: CPT

## 2019-01-05 PROCEDURE — 94640 AIRWAY INHALATION TREATMENT: CPT

## 2019-01-05 PROCEDURE — 85025 COMPLETE CBC W/AUTO DIFF WBC: CPT

## 2019-01-05 PROCEDURE — 36415 COLL VENOUS BLD VENIPUNCTURE: CPT

## 2019-01-05 RX ADMIN — ALBUTEROL SULFATE 2.5 MG: 2.5 SOLUTION RESPIRATORY (INHALATION) at 08:13

## 2019-01-05 RX ADMIN — ALBUTEROL SULFATE 2.5 MG: 2.5 SOLUTION RESPIRATORY (INHALATION) at 16:50

## 2019-01-05 RX ADMIN — PANTOPRAZOLE SODIUM 40 MG: 40 TABLET, DELAYED RELEASE ORAL at 05:42

## 2019-01-05 RX ADMIN — SODIUM CHLORIDE: 9 INJECTION, SOLUTION INTRAVENOUS at 03:27

## 2019-01-05 RX ADMIN — ACETAMINOPHEN 650 MG: 325 TABLET ORAL at 03:32

## 2019-01-05 RX ADMIN — ALBUTEROL SULFATE 2.5 MG: 2.5 SOLUTION RESPIRATORY (INHALATION) at 21:39

## 2019-01-05 RX ADMIN — ENOXAPARIN SODIUM 120 MG: 150 INJECTION SUBCUTANEOUS at 05:42

## 2019-01-05 RX ADMIN — ENOXAPARIN SODIUM 120 MG: 150 INJECTION SUBCUTANEOUS at 20:38

## 2019-01-05 RX ADMIN — CEFTRIAXONE SODIUM 1 G: 1 INJECTION, POWDER, FOR SOLUTION INTRAMUSCULAR; INTRAVENOUS at 00:21

## 2019-01-05 RX ADMIN — ALBUTEROL SULFATE 2.5 MG: 2.5 SOLUTION RESPIRATORY (INHALATION) at 12:42

## 2019-01-05 RX ADMIN — AZITHROMYCIN MONOHYDRATE 500 MG: 500 INJECTION, POWDER, LYOPHILIZED, FOR SOLUTION INTRAVENOUS at 18:34

## 2019-01-05 ASSESSMENT — PAIN SCALES - GENERAL
PAINLEVEL_OUTOF10: 0
PAINLEVEL_OUTOF10: 2
PAINLEVEL_OUTOF10: 0
PAINLEVEL_OUTOF10: 2

## 2019-01-05 ASSESSMENT — PAIN DESCRIPTION - ONSET: ONSET: ON-GOING

## 2019-01-05 ASSESSMENT — PAIN DESCRIPTION - PAIN TYPE: TYPE: ACUTE PAIN

## 2019-01-05 ASSESSMENT — PAIN DESCRIPTION - DESCRIPTORS: DESCRIPTORS: ACHING

## 2019-01-05 ASSESSMENT — PAIN DESCRIPTION - LOCATION: LOCATION: NECK

## 2019-01-05 ASSESSMENT — PAIN DESCRIPTION - FREQUENCY: FREQUENCY: CONTINUOUS

## 2019-01-06 ENCOUNTER — APPOINTMENT (OUTPATIENT)
Dept: GENERAL RADIOLOGY | Age: 39
DRG: 871 | End: 2019-01-06
Payer: COMMERCIAL

## 2019-01-06 VITALS
RESPIRATION RATE: 18 BRPM | DIASTOLIC BLOOD PRESSURE: 64 MMHG | HEIGHT: 70 IN | BODY MASS INDEX: 37.22 KG/M2 | OXYGEN SATURATION: 95 % | WEIGHT: 260 LBS | SYSTOLIC BLOOD PRESSURE: 120 MMHG | TEMPERATURE: 98.4 F | HEART RATE: 75 BPM

## 2019-01-06 LAB
BASOPHILS # BLD: 0.5 %
BASOPHILS ABSOLUTE: 0 THOU/MM3 (ref 0–0.1)
EOSINOPHIL # BLD: 3.6 %
EOSINOPHILS ABSOLUTE: 0.3 THOU/MM3 (ref 0–0.4)
ERYTHROCYTE [DISTWIDTH] IN BLOOD BY AUTOMATED COUNT: 12.7 % (ref 11.5–14.5)
ERYTHROCYTE [DISTWIDTH] IN BLOOD BY AUTOMATED COUNT: 38.5 FL (ref 35–45)
HCT VFR BLD CALC: 35.6 % (ref 42–52)
HEMOGLOBIN: 11.7 GM/DL (ref 14–18)
IMMATURE GRANS (ABS): 0.13 THOU/MM3 (ref 0–0.07)
IMMATURE GRANULOCYTES: 1.7 %
LEGIONELLA URINARY AG: NEGATIVE
LYMPHOCYTES # BLD: 37.4 %
LYMPHOCYTES ABSOLUTE: 2.8 THOU/MM3 (ref 1–4.8)
MCH RBC QN AUTO: 27.4 PG (ref 26–33)
MCHC RBC AUTO-ENTMCNC: 32.9 GM/DL (ref 32.2–35.5)
MCV RBC AUTO: 83.4 FL (ref 80–94)
MONOCYTES # BLD: 9.6 %
MONOCYTES ABSOLUTE: 0.7 THOU/MM3 (ref 0.4–1.3)
NUCLEATED RED BLOOD CELLS: 0 /100 WBC
PLATELET # BLD: 251 THOU/MM3 (ref 130–400)
PMV BLD AUTO: 9.9 FL (ref 9.4–12.4)
RBC # BLD: 4.27 MILL/MM3 (ref 4.7–6.1)
SEG NEUTROPHILS: 47.2 %
SEGMENTED NEUTROPHILS ABSOLUTE COUNT: 3.6 THOU/MM3 (ref 1.8–7.7)
STREP PNEUMO AG, UR: NEGATIVE
THROAT/NOSE CULTURE: NORMAL
WBC # BLD: 7.6 THOU/MM3 (ref 4.8–10.8)

## 2019-01-06 PROCEDURE — 99232 SBSQ HOSP IP/OBS MODERATE 35: CPT | Performed by: INTERNAL MEDICINE

## 2019-01-06 PROCEDURE — 6360000002 HC RX W HCPCS: Performed by: INTERNAL MEDICINE

## 2019-01-06 PROCEDURE — 94640 AIRWAY INHALATION TREATMENT: CPT

## 2019-01-06 PROCEDURE — 2580000003 HC RX 258: Performed by: INTERNAL MEDICINE

## 2019-01-06 PROCEDURE — 2580000003 HC RX 258: Performed by: EMERGENCY MEDICINE

## 2019-01-06 PROCEDURE — 71046 X-RAY EXAM CHEST 2 VIEWS: CPT

## 2019-01-06 PROCEDURE — 85025 COMPLETE CBC W/AUTO DIFF WBC: CPT

## 2019-01-06 PROCEDURE — 6370000000 HC RX 637 (ALT 250 FOR IP): Performed by: INTERNAL MEDICINE

## 2019-01-06 PROCEDURE — 36415 COLL VENOUS BLD VENIPUNCTURE: CPT

## 2019-01-06 RX ORDER — ALBUTEROL SULFATE 90 UG/1
2 AEROSOL, METERED RESPIRATORY (INHALATION) 4 TIMES DAILY PRN
Qty: 1 INHALER | Refills: 0 | Status: SHIPPED | OUTPATIENT
Start: 2019-01-06 | End: 2019-07-03

## 2019-01-06 RX ORDER — AZITHROMYCIN 250 MG/1
250 TABLET, FILM COATED ORAL DAILY
Qty: 3 TABLET | Refills: 0 | Status: SHIPPED | OUTPATIENT
Start: 2019-01-06 | End: 2019-01-09

## 2019-01-06 RX ORDER — CEFDINIR 300 MG/1
300 CAPSULE ORAL 2 TIMES DAILY
Qty: 10 CAPSULE | Refills: 0 | Status: SHIPPED | OUTPATIENT
Start: 2019-01-06 | End: 2019-01-11

## 2019-01-06 RX ADMIN — PANTOPRAZOLE SODIUM 40 MG: 40 TABLET, DELAYED RELEASE ORAL at 05:11

## 2019-01-06 RX ADMIN — CEFTRIAXONE SODIUM 1 G: 1 INJECTION, POWDER, FOR SOLUTION INTRAMUSCULAR; INTRAVENOUS at 00:39

## 2019-01-06 RX ADMIN — ALBUTEROL SULFATE 2.5 MG: 2.5 SOLUTION RESPIRATORY (INHALATION) at 07:41

## 2019-01-06 RX ADMIN — Medication 10 ML: at 09:09

## 2019-01-06 ASSESSMENT — PAIN SCALES - GENERAL
PAINLEVEL_OUTOF10: 0
PAINLEVEL_OUTOF10: 0

## 2019-01-07 ENCOUNTER — TELEPHONE (OUTPATIENT)
Dept: FAMILY MEDICINE CLINIC | Age: 39
End: 2019-01-07

## 2019-01-10 LAB
BLOOD CULTURE, ROUTINE: NORMAL
BLOOD CULTURE, ROUTINE: NORMAL

## 2019-01-14 ENCOUNTER — OFFICE VISIT (OUTPATIENT)
Dept: FAMILY MEDICINE CLINIC | Age: 39
End: 2019-01-14
Payer: COMMERCIAL

## 2019-01-14 VITALS
WEIGHT: 263 LBS | DIASTOLIC BLOOD PRESSURE: 72 MMHG | BODY MASS INDEX: 37.74 KG/M2 | HEART RATE: 80 BPM | RESPIRATION RATE: 14 BRPM | SYSTOLIC BLOOD PRESSURE: 120 MMHG | OXYGEN SATURATION: 98 %

## 2019-01-14 DIAGNOSIS — M21.41 PES PLANUS OF BOTH FEET: Primary | ICD-10-CM

## 2019-01-14 DIAGNOSIS — M21.42 PES PLANUS OF BOTH FEET: Primary | ICD-10-CM

## 2019-01-14 DIAGNOSIS — J18.9 PNEUMONIA DUE TO ORGANISM: ICD-10-CM

## 2019-01-14 PROCEDURE — 99495 TRANSJ CARE MGMT MOD F2F 14D: CPT | Performed by: FAMILY MEDICINE

## 2019-01-14 PROCEDURE — 1111F DSCHRG MED/CURRENT MED MERGE: CPT | Performed by: FAMILY MEDICINE

## 2019-01-14 ASSESSMENT — ENCOUNTER SYMPTOMS
NAUSEA: 0
DIARRHEA: 0
SORE THROAT: 0
SHORTNESS OF BREATH: 0
COUGH: 1
EYE DISCHARGE: 0
RHINORRHEA: 0
WHEEZING: 0
ABDOMINAL PAIN: 0
CONSTIPATION: 0

## 2019-04-24 DIAGNOSIS — E29.1 HYPOGONADISM IN MALE: ICD-10-CM

## 2019-04-24 RX ORDER — TESTOSTERONE CYPIONATE 200 MG/ML
100 INJECTION INTRAMUSCULAR
Qty: 2 ML | Refills: 3 | Status: SHIPPED | OUTPATIENT
Start: 2019-04-24 | End: 2019-07-03 | Stop reason: SDUPTHER

## 2019-04-24 NOTE — TELEPHONE ENCOUNTER
Refill request    Requested Prescriptions     Pending Prescriptions Disp Refills    testosterone cypionate (DEPOTESTOTERONE CYPIONATE) 200 MG/ML injection 2 mL 3     Sig: Inject 0.5 mLs into the muscle every 14 days for 31 days.        Medication:   2ml #3refills  Last refilled:  10/02/18  Provider last seen:   Perla Garza CNP  Date of last visit:   12/14/18  Date of follow up:   06/14/19  Pharmacy:   [address] [place], [place] [address]    Medication list says discontinued

## 2019-04-25 ENCOUNTER — TELEPHONE (OUTPATIENT)
Dept: UROLOGY | Age: 39
End: 2019-04-25

## 2019-04-25 RX ORDER — SYRINGE W-NEEDLE,DISPOSAB,3 ML 25GX5/8"
1 SYRINGE, EMPTY DISPOSABLE MISCELLANEOUS
Qty: 2 EACH | Refills: 5 | Status: SHIPPED | OUTPATIENT
Start: 2019-04-25 | End: 2019-07-03 | Stop reason: SDUPTHER

## 2019-04-25 NOTE — TELEPHONE ENCOUNTER
Andrea Jackson from [place] [address] left a message stating the patient also needed a refill for the syringes. Please advise. Thank you.

## 2019-06-26 ENCOUNTER — NURSE ONLY (OUTPATIENT)
Dept: LAB | Age: 39
End: 2019-06-26

## 2019-06-26 DIAGNOSIS — E23.0 HYPOGONADOTROPIC HYPOGONADISM SYNDROME, MALE (HCC): ICD-10-CM

## 2019-06-26 LAB
HCT VFR BLD CALC: 46.6 % (ref 42–52)
HEMOGLOBIN: 15.5 GM/DL (ref 14–18)

## 2019-06-27 LAB — TESTOSTERONE TOTAL: 206 NG/DL (ref 300–1080)

## 2019-07-03 ENCOUNTER — OFFICE VISIT (OUTPATIENT)
Dept: UROLOGY | Age: 39
End: 2019-07-03
Payer: COMMERCIAL

## 2019-07-03 VITALS
SYSTOLIC BLOOD PRESSURE: 114 MMHG | BODY MASS INDEX: 38.37 KG/M2 | HEIGHT: 70 IN | WEIGHT: 268 LBS | DIASTOLIC BLOOD PRESSURE: 68 MMHG

## 2019-07-03 DIAGNOSIS — E29.1 HYPOGONADISM IN MALE: Primary | ICD-10-CM

## 2019-07-03 LAB
BILIRUBIN URINE: NEGATIVE
BLOOD URINE, POC: NEGATIVE
CHARACTER, URINE: CLEAR
COLOR, URINE: YELLOW
GLUCOSE URINE: NEGATIVE MG/DL
KETONES, URINE: NEGATIVE
LEUKOCYTE CLUMPS, URINE: NEGATIVE
NITRITE, URINE: NEGATIVE
PH, URINE: 6 (ref 5–9)
PROTEIN, URINE: NEGATIVE MG/DL
SPECIFIC GRAVITY, URINE: 1.02 (ref 1–1.03)
UROBILINOGEN, URINE: 0.2 EU/DL (ref 0–1)

## 2019-07-03 PROCEDURE — G8427 DOCREV CUR MEDS BY ELIG CLIN: HCPCS | Performed by: NURSE PRACTITIONER

## 2019-07-03 PROCEDURE — G8417 CALC BMI ABV UP PARAM F/U: HCPCS | Performed by: NURSE PRACTITIONER

## 2019-07-03 PROCEDURE — 81003 URINALYSIS AUTO W/O SCOPE: CPT | Performed by: NURSE PRACTITIONER

## 2019-07-03 PROCEDURE — 99213 OFFICE O/P EST LOW 20 MIN: CPT | Performed by: NURSE PRACTITIONER

## 2019-07-03 PROCEDURE — 1036F TOBACCO NON-USER: CPT | Performed by: NURSE PRACTITIONER

## 2019-07-03 RX ORDER — SYRINGE W-NEEDLE,DISPOSAB,3 ML 25GX5/8"
1 SYRINGE, EMPTY DISPOSABLE MISCELLANEOUS
Qty: 2 EACH | Refills: 5 | Status: SHIPPED | OUTPATIENT
Start: 2019-07-03 | End: 2020-07-09 | Stop reason: SDUPTHER

## 2019-07-03 RX ORDER — TESTOSTERONE CYPIONATE 200 MG/ML
100 INJECTION INTRAMUSCULAR
Qty: 2 ML | Refills: 5 | Status: SHIPPED | OUTPATIENT
Start: 2019-07-03 | End: 2020-02-14 | Stop reason: SDUPTHER

## 2019-07-03 ASSESSMENT — ENCOUNTER SYMPTOMS
ABDOMINAL PAIN: 0
NAUSEA: 0
VOMITING: 0

## 2020-02-14 ENCOUNTER — TELEPHONE (OUTPATIENT)
Dept: UROLOGY | Age: 40
End: 2020-02-14

## 2020-02-15 ENCOUNTER — NURSE ONLY (OUTPATIENT)
Dept: LAB | Age: 40
End: 2020-02-15

## 2020-02-15 LAB
HCT VFR BLD CALC: 45.2 % (ref 42–52)
HEMOGLOBIN: 15 GM/DL (ref 14–18)
PROSTATE SPECIFIC ANTIGEN: 0.37 NG/ML (ref 0–1)

## 2020-02-19 LAB — TESTOSTERONE TOTAL: 45 NG/DL (ref 300–1080)

## 2020-07-07 ENCOUNTER — NURSE ONLY (OUTPATIENT)
Dept: LAB | Age: 40
End: 2020-07-07

## 2020-07-07 LAB
HCT VFR BLD CALC: 44.5 % (ref 42–52)
HEMOGLOBIN: 14.7 GM/DL (ref 14–18)
PROSTATE SPECIFIC ANTIGEN: 0.68 NG/ML (ref 0–1)

## 2020-07-08 LAB — TESTOSTERONE FREE: NORMAL

## 2020-07-09 ENCOUNTER — TELEPHONE (OUTPATIENT)
Dept: UROLOGY | Age: 40
End: 2020-07-09

## 2020-07-09 RX ORDER — SYRINGE W-NEEDLE,DISPOSAB,3 ML 25GX5/8"
1 SYRINGE, EMPTY DISPOSABLE MISCELLANEOUS
Qty: 2 EACH | Refills: 5 | Status: SHIPPED | OUTPATIENT
Start: 2020-07-09 | End: 2020-07-30 | Stop reason: ALTCHOICE

## 2020-07-09 RX ORDER — TESTOSTERONE CYPIONATE 200 MG/ML
100 INJECTION INTRAMUSCULAR
Qty: 2 ML | Refills: 3 | Status: SHIPPED | OUTPATIENT
Start: 2020-07-09 | End: 2020-07-30 | Stop reason: ALTCHOICE

## 2020-07-09 NOTE — TELEPHONE ENCOUNTER
Testosterone level 115. Refill for Testosterone injection sent to pharmacy. F/u as scheduled with me in 1 week.

## 2020-07-09 NOTE — TELEPHONE ENCOUNTER
Patient advised Testosterone level 115, prescription was sent to the pharmacy. He voiced understanding and confirmed appointment.

## 2020-07-15 ENCOUNTER — OFFICE VISIT (OUTPATIENT)
Dept: UROLOGY | Age: 40
End: 2020-07-15
Payer: COMMERCIAL

## 2020-07-15 VITALS — TEMPERATURE: 97.9 F | HEIGHT: 70 IN | BODY MASS INDEX: 39.7 KG/M2 | WEIGHT: 277.3 LBS

## 2020-07-15 PROCEDURE — G8417 CALC BMI ABV UP PARAM F/U: HCPCS | Performed by: UROLOGY

## 2020-07-15 PROCEDURE — G8427 DOCREV CUR MEDS BY ELIG CLIN: HCPCS | Performed by: UROLOGY

## 2020-07-15 PROCEDURE — 99213 OFFICE O/P EST LOW 20 MIN: CPT | Performed by: UROLOGY

## 2020-07-15 PROCEDURE — 1036F TOBACCO NON-USER: CPT | Performed by: UROLOGY

## 2020-07-15 NOTE — PROGRESS NOTES
Dr. Jl Hung MD  [facility])  Urology Clinic      Patient:  Hussain Aguirre  YOB: 1980  Date: 7/15/2020    HISTORY OF PRESENT ILLNESS:   The patient is a 45 y.o. male who presents today for follow-up for the following problem(s): low testosterone. Last testosterone was worsen at 115. PSA stable. Overall the problem(s) : are worsening. Associated Symptoms: No dysuria, gross hematuria. Pain Severity: 0/10    Summary of old records:   Last T is 115 and worsening. Imaging/Labs reviewed during today's visit:  I have independently reviewed and verified the following films during today's visit. PSA (see below)    Last several PSA's:  Lab Results   Component Value Date    PSA 0.68 07/07/2020    PSA 0.37 02/15/2020    PSA 0.54 05/24/2018       Last total testosterone:  Lab Results   Component Value Date    TESTOSTERONE 45 (L) 02/15/2020       Urinalysis today:  No results found for this visit on 07/15/20. Last BUN and creatinine:  Lab Results   Component Value Date    BUN 8 01/05/2019     Lab Results   Component Value Date    CREATININE 0.9 01/05/2019       Imaging Reviewed during this Office Visit:   (results were independently reviewed by physician and radiology report verified)    PAST MEDICAL, FAMILY AND SOCIAL HISTORY UPDATE:  Past Medical History:   Diagnosis Date    Azoospermia     Hypogonadism      History reviewed. No pertinent surgical history. Family History   Problem Relation Age of Onset    Cancer Mother         hodgkins    No Known Problems Father      Outpatient Medications Marked as Taking for the 7/15/20 encounter (Office Visit) with Jl Hung MD   Medication Sig Dispense Refill    testosterone cypionate (DEPOTESTOTERONE CYPIONATE) 200 MG/ML injection Inject 0.5 mLs into the muscle every 14 days for 182 days.  2 mL 3    Syringe/Needle, Disp, (SYRINGE 3CC/25GX1\") 25G X 1\" 3 ML MISC 1 each by Does not apply route every 14 days 2 each 5    Needles & Syringes MISC 1 each by Does not apply route daily 3 ml syringes 20 each 1       Patient has no known allergies. Social History     Tobacco Use   Smoking Status Never Smoker   Smokeless Tobacco Never Used       Social History     Substance and Sexual Activity   Alcohol Use No       REVIEW OF SYSTEMS:  Constitutional: negative  Eyes: negative  Respiratory: negative  Cardiovascular: negative  Gastrointestinal: negative  Musculoskeletal: negative  Genitourinary: negative except for what is in HPI  Skin: negative   Neurological: negative  Hematological/Lymphatic: negative  Psychological: negative    Physical Exam:      Vitals:    07/15/20 0847   Temp: 97.9 °F (36.6 °C)     Patient is a 45 y.o. male in no acute distress and alert and oriented to person, place and time. NAD, A/o  Non labored respiration  Normal peripheral pulses  Skin- warm and dry  Psych- normal mood and affect      Assessment and Plan      1. Hypogonadism in male           Plan:      No follow-ups on file. Will refill Testosterone. Discussed testopel but he doesn't think insurance will cover. He will check. Will see back in 6 months with T, LFTs, and PSA.           Dr. Sky Pleitez MD

## 2020-07-16 ENCOUNTER — TELEPHONE (OUTPATIENT)
Dept: UROLOGY | Age: 40
End: 2020-07-16

## 2020-07-16 NOTE — TELEPHONE ENCOUNTER
Patient is calling stating that his pharmacy, [facility] in [place], [place] is requesting a Prior Authorization on this prescription.  Please advise  [phone]

## 2020-07-17 NOTE — TELEPHONE ENCOUNTER
Patient called to check on the status of the PA and was advised of this information. He voiced understanding.

## 2020-07-23 NOTE — TELEPHONE ENCOUNTER
Pharmacy called and said [facility] told them that PA had to be sent in by paper and office notes. Form printed off website and sent in along with office notes and labs.  Faxed to [phone]

## 2020-07-30 RX ORDER — TESTOSTERONE 4 MG/D
1 PATCH TRANSDERMAL DAILY
Qty: 30 PATCH | Refills: 5 | Status: SHIPPED | OUTPATIENT
Start: 2020-07-30 | End: 2021-08-04 | Stop reason: SDUPTHER

## 2020-07-30 NOTE — TELEPHONE ENCOUNTER
Medicaid denied testosterone injections until patient try's Androderm patch or generic testosterone 1% gel packets. Dr Gonzalez Martins said patient can try Androderm patches. Script printed out per Dr Mohit Taveras and pt to  at .

## 2021-01-15 ENCOUNTER — NURSE ONLY (OUTPATIENT)
Dept: LAB | Age: 41
End: 2021-01-15

## 2021-01-15 DIAGNOSIS — E29.1 HYPOGONADISM IN MALE: ICD-10-CM

## 2021-01-15 LAB
ALBUMIN SERPL-MCNC: 4.5 G/DL (ref 3.5–5.1)
ALP BLD-CCNC: 76 U/L (ref 38–126)
ALT SERPL-CCNC: 30 U/L (ref 11–66)
AST SERPL-CCNC: 21 U/L (ref 5–40)
BILIRUB SERPL-MCNC: 1.4 MG/DL (ref 0.3–1.2)
BILIRUBIN DIRECT: < 0.2 MG/DL (ref 0–0.3)
PROSTATE SPECIFIC ANTIGEN: 0.44 NG/ML (ref 0–1)
TOTAL PROTEIN: 7.9 G/DL (ref 6.1–8)

## 2021-01-17 LAB — TESTOSTERONE TOTAL: 146 NG/DL (ref 300–1080)

## 2021-01-20 ENCOUNTER — OFFICE VISIT (OUTPATIENT)
Dept: UROLOGY | Age: 41
End: 2021-01-20
Payer: COMMERCIAL

## 2021-01-20 VITALS — HEIGHT: 70 IN | BODY MASS INDEX: 34.69 KG/M2 | WEIGHT: 242.3 LBS | TEMPERATURE: 97.8 F

## 2021-01-20 DIAGNOSIS — E29.1 HYPOGONADISM IN MALE: Primary | ICD-10-CM

## 2021-01-20 LAB
BILIRUBIN URINE: NEGATIVE
BLOOD URINE, POC: NEGATIVE
CHARACTER, URINE: CLEAR
COLOR, URINE: YELLOW
GLUCOSE URINE: NEGATIVE MG/DL
KETONES, URINE: NEGATIVE
LEUKOCYTE CLUMPS, URINE: NEGATIVE
NITRITE, URINE: NEGATIVE
PH, URINE: 5.5 (ref 5–9)
PROTEIN, URINE: NEGATIVE MG/DL
SPECIFIC GRAVITY, URINE: >= 1.03 (ref 1–1.03)
UROBILINOGEN, URINE: 0.2 EU/DL (ref 0–1)

## 2021-01-20 PROCEDURE — 99203 OFFICE O/P NEW LOW 30 MIN: CPT | Performed by: UROLOGY

## 2021-01-20 PROCEDURE — 1036F TOBACCO NON-USER: CPT | Performed by: UROLOGY

## 2021-01-20 PROCEDURE — 81003 URINALYSIS AUTO W/O SCOPE: CPT | Performed by: UROLOGY

## 2021-01-20 PROCEDURE — G8417 CALC BMI ABV UP PARAM F/U: HCPCS | Performed by: UROLOGY

## 2021-01-20 PROCEDURE — G8484 FLU IMMUNIZE NO ADMIN: HCPCS | Performed by: UROLOGY

## 2021-01-20 PROCEDURE — G8427 DOCREV CUR MEDS BY ELIG CLIN: HCPCS | Performed by: UROLOGY

## 2021-01-20 RX ORDER — TESTOSTERONE 4 MG/D
8 PATCH TRANSDERMAL DAILY
Qty: 60 PATCH | Refills: 5 | Status: SHIPPED | OUTPATIENT
Start: 2021-01-20 | End: 2022-02-28 | Stop reason: SDUPTHER

## 2021-01-20 NOTE — PROGRESS NOTES
Laterality Date    CHOLECYSTECTOMY      stent placement as well. Family History   Problem Relation Age of Onset    Cancer Mother         hodgkins    No Known Problems Father      Outpatient Medications Marked as Taking for the 1/20/21 encounter (Office Visit) with Paula Roberts MD   Medication Sig Dispense Refill    Testosterone (ANDRODERM) 4 MG/24HR PT24 Place 1 patch onto the skin daily for 30 days. 30 patch 5       Patient has no known allergies. Social History     Tobacco Use   Smoking Status Never Smoker   Smokeless Tobacco Never Used       Social History     Substance and Sexual Activity   Alcohol Use No       REVIEW OF SYSTEMS:  Constitutional: negative  Eyes: negative  Respiratory: negative  Cardiovascular: negative  Gastrointestinal: negative  Musculoskeletal: negative  Genitourinary: negative except for what is in HPI  Skin: negative   Neurological: negative  Hematological/Lymphatic: negative  Psychological: negative    Physical Exam:      Vitals:    01/20/21 0830   Temp: 97.8 °F (36.6 °C)     Patient is a 45 y.o. male in no acute distress and alert and oriented to person, place and time. NAD, A/o  Non labored respiration  Normal peripheral pulses  Skin- warm and dry  Psych- normal mood and affect      Assessment and Plan      1. Hypogonadism in male           Plan:      No follow-ups on file. T is still low. Will need to increase to two patches daily. Follow up in 6 months with repeat T level.     Testosterone is a drug that requires close monitoring including HCT, PSA, LFTs, and T.          Dr. Paula Roberts MD

## 2021-01-27 ENCOUNTER — TELEPHONE (OUTPATIENT)
Dept: UROLOGY | Age: 41
End: 2021-01-27

## 2021-01-27 NOTE — TELEPHONE ENCOUNTER
Patient insurance will not pay for 60 patches androderm. They will only pay for 30 patches. Can a prior auth be completed? Thank you.

## 2021-01-28 ENCOUNTER — TELEPHONE (OUTPATIENT)
Dept: UROLOGY | Age: 41
End: 2021-01-28

## 2021-01-28 NOTE — TELEPHONE ENCOUNTER
Initiated a PA for testosterone and it came back to have pharmacy contact the pharmacy help line at [phone]. Yadiel Handley and gave message.

## 2021-02-02 NOTE — TELEPHONE ENCOUNTER
Pt's insurance will not pay for 2 Androderm patches a day. Most they will do is 30 a month. Would you like to try something else or have patient go back to 1 a day patch. Please advise.

## 2021-02-02 NOTE — TELEPHONE ENCOUNTER
Please contact the patient and let him know his insurance will not cover this. See if he would like to try testosterone injections or testopel instead.     Dr. Jeyson Cummings MD

## 2021-02-04 NOTE — TELEPHONE ENCOUNTER
Pt has been on everything for testosterone. Called patient and discussed with him Dr Simran Spangler choices. Pt stated [facility] will not pay for Testopel as he checked into it. Also he did Testosterone injections and wasn't real compliant on giving himself the injections. He said if he had to he would go back to injections. I told him I will do an appeal to [employer] and see if I could get them to approve 60 patches a month.  In the mean time patient would like to keep doing 1 patch daily until I hear back from appeal.

## 2021-02-09 NOTE — TELEPHONE ENCOUNTER
Appeal was approved. Yadiel 34 and also patient to let know it came back approved for 60. Pt had already picked up 30 so will have to wait till next month to  60. I told him any problems at that time to call us.

## 2021-02-11 ENCOUNTER — VIRTUAL VISIT (OUTPATIENT)
Dept: FAMILY MEDICINE CLINIC | Age: 41
End: 2021-02-11
Payer: COMMERCIAL

## 2021-02-11 ENCOUNTER — HOSPITAL ENCOUNTER (OUTPATIENT)
Age: 41
Setting detail: SPECIMEN
Discharge: HOME | End: 2021-02-11
Payer: COMMERCIAL

## 2021-02-11 DIAGNOSIS — R50.9 FEVER, UNSPECIFIED FEVER CAUSE: Primary | ICD-10-CM

## 2021-02-11 DIAGNOSIS — R50.9 FEVER, UNSPECIFIED FEVER CAUSE: ICD-10-CM

## 2021-02-11 LAB
FLU A ANTIGEN: NEGATIVE
FLU B ANTIGEN: NEGATIVE

## 2021-02-11 PROCEDURE — G8428 CUR MEDS NOT DOCUMENT: HCPCS | Performed by: FAMILY MEDICINE

## 2021-02-11 PROCEDURE — 87804 INFLUENZA ASSAY W/OPTIC: CPT

## 2021-02-11 PROCEDURE — 99213 OFFICE O/P EST LOW 20 MIN: CPT | Performed by: FAMILY MEDICINE

## 2021-02-11 PROCEDURE — U0003 INFECTIOUS AGENT DETECTION BY NUCLEIC ACID (DNA OR RNA); SEVERE ACUTE RESPIRATORY SYNDROME CORONAVIRUS 2 (SARS-COV-2) (CORONAVIRUS DISEASE [COVID-19]), AMPLIFIED PROBE TECHNIQUE, MAKING USE OF HIGH THROUGHPUT TECHNOLOGIES AS DESCRIBED BY CMS-2020-01-R: HCPCS

## 2021-02-11 ASSESSMENT — ENCOUNTER SYMPTOMS
VOMITING: 0
WHEEZING: 0
NAUSEA: 1
COUGH: 0
CHEST TIGHTNESS: 0
DIARRHEA: 0

## 2021-02-11 ASSESSMENT — PATIENT HEALTH QUESTIONNAIRE - PHQ9
SUM OF ALL RESPONSES TO PHQ QUESTIONS 1-9: 0
SUM OF ALL RESPONSES TO PHQ QUESTIONS 1-9: 0
2. FEELING DOWN, DEPRESSED OR HOPELESS: 0
SUM OF ALL RESPONSES TO PHQ9 QUESTIONS 1 & 2: 0

## 2021-02-11 NOTE — PROGRESS NOTES
Abel Tate is a 45 y.o. male being evaluated by a Virtual Visit (video visit) encounter to address concerns as mentioned above. A caregiver was present when appropriate. Due to this being a TeleHealth encounter (During FGWKY-18 public health emergency), evaluation of the following organ systems was limited: Vitals/Constitutional/EENT/Resp/CV/GI//MS/Neuro/Skin/Heme-Lymph-Imm. Pursuant to the emergency declaration under the [address] [place] [address] and the [employer] and [employer] Act, this Virtual Visit was conducted with patient's (and/or legal guardian's) consent, to reduce the patient's risk of exposure to COVID-19 and provide necessary medical care. The patient (and/or legal guardian) has also been advised to contact this office for worsening conditions or problems, and seek emergency medical treatment and/or call 911 if deemed necessary. Patient identification was verified at the start of the visit: Yes    Services were provided through a video synchronous discussion virtually to substitute for in-person clinic visit. Patient was located at home and provider was located in office or at home. An electronic signature was used to authenticate this note.     --Sharla Cortez MD

## 2021-02-13 LAB — SARS-COV-2: NOT DETECTED

## 2021-07-14 ENCOUNTER — NURSE ONLY (OUTPATIENT)
Dept: LAB | Age: 41
End: 2021-07-14

## 2021-07-14 DIAGNOSIS — E29.1 HYPOGONADISM IN MALE: ICD-10-CM

## 2021-07-16 LAB — TESTOSTERONE TOTAL: 373 NG/DL (ref 300–890)

## 2021-08-04 ENCOUNTER — OFFICE VISIT (OUTPATIENT)
Dept: UROLOGY | Age: 41
End: 2021-08-04
Payer: COMMERCIAL

## 2021-08-04 VITALS — HEIGHT: 70 IN | WEIGHT: 277 LBS | RESPIRATION RATE: 20 BRPM | BODY MASS INDEX: 39.65 KG/M2

## 2021-08-04 DIAGNOSIS — E29.1 HYPOGONADISM IN MALE: Primary | ICD-10-CM

## 2021-08-04 PROCEDURE — G8417 CALC BMI ABV UP PARAM F/U: HCPCS | Performed by: UROLOGY

## 2021-08-04 PROCEDURE — G8428 CUR MEDS NOT DOCUMENT: HCPCS | Performed by: UROLOGY

## 2021-08-04 PROCEDURE — 99214 OFFICE O/P EST MOD 30 MIN: CPT | Performed by: UROLOGY

## 2021-08-04 PROCEDURE — 1036F TOBACCO NON-USER: CPT | Performed by: UROLOGY

## 2021-08-04 RX ORDER — TESTOSTERONE 4 MG/D
8 PATCH TRANSDERMAL DAILY
Qty: 60 PATCH | Refills: 5 | Status: SHIPPED | OUTPATIENT
Start: 2021-08-04 | End: 2022-02-02 | Stop reason: SDUPTHER

## 2021-08-04 NOTE — PROGRESS NOTES
Dr. Di Swann MD  [facility])  Urology Clinic      Patient:  Tammy Sears  YOB: 1980  Date: 8/4/2021    HISTORY OF PRESENT ILLNESS:   The patient is a 37 y.o. male who presents today for follow-up for the following problem(s): low testosterone. Last testosterone is still low at 146. PSA 0.44. Feels somewhat better on T patch. Overall the problem(s) : are worsening. Associated Symptoms: No dysuria, gross hematuria. Pain Severity: 0/10    Summary of old records:   Last T is 146    Imaging/Labs reviewed during today's visit:  I have independently reviewed and verified the following films during today's visit. PSA (see below). T 146. Last several PSA's:  Lab Results   Component Value Date    PSA 0.44 01/15/2021    PSA 0.68 07/07/2020    PSA 0.37 02/15/2020       Last total testosterone:  Lab Results   Component Value Date    TESTOSTERONE 373 07/14/2021       Urinalysis today:  No results found for this visit on 08/04/21. Last BUN and creatinine:  Lab Results   Component Value Date    BUN 8 01/05/2019     Lab Results   Component Value Date    CREATININE 0.9 01/05/2019       Imaging Reviewed during this Office Visit:   (results were independently reviewed by physician and radiology report verified)    PAST MEDICAL, FAMILY AND SOCIAL HISTORY UPDATE:  Past Medical History:   Diagnosis Date    Azoospermia     Hypogonadism      Past Surgical History:   Procedure Laterality Date    CHOLECYSTECTOMY      stent placement as well. Family History   Problem Relation Age of Onset    Cancer Mother         hodgkins    No Known Problems Father      Outpatient Medications Marked as Taking for the 8/4/21 encounter (Office Visit) with Di Swann MD   Medication Sig Dispense Refill    ANDRODERM 4 MG/24HR PT24 Place 8 mg onto the skin daily for 30 days. Two patches daily. 60 patch 5       Patient has no known allergies.   Social History     Tobacco Use   Smoking Status Never Smoker   Smokeless Tobacco Never Used       Social History     Substance and Sexual Activity   Alcohol Use No       REVIEW OF SYSTEMS:  Constitutional: negative  Eyes: negative  Respiratory: negative  Cardiovascular: negative  Gastrointestinal: negative  Musculoskeletal: negative  Genitourinary: negative except for what is in HPI  Skin: negative   Neurological: negative  Hematological/Lymphatic: negative  Psychological: negative    Physical Exam:      Vitals:    08/04/21 1513   Resp: 20     Patient is a 37 y.o. male in no acute distress and alert and oriented to person, place and time. NAD, A/o  Non labored respiration  Normal peripheral pulses  Skin- warm and dry  Psych- normal mood and affect      Assessment and Plan      1. Hypogonadism in male           Plan:      No follow-ups on file. T is 373. Perfect  Continue 2 patches per day  See back in 6 months with CBC, LFTs, and T levels.           Dr. Katherine Daniel MD

## 2022-01-31 ENCOUNTER — NURSE ONLY (OUTPATIENT)
Dept: LAB | Age: 42
End: 2022-01-31

## 2022-01-31 DIAGNOSIS — E29.1 HYPOGONADISM IN MALE: ICD-10-CM

## 2022-01-31 LAB
ALBUMIN SERPL-MCNC: 4.8 G/DL (ref 3.5–5.1)
ALP BLD-CCNC: 74 U/L (ref 38–126)
ALT SERPL-CCNC: 51 U/L (ref 11–66)
AST SERPL-CCNC: 29 U/L (ref 5–40)
BILIRUB SERPL-MCNC: 0.6 MG/DL (ref 0.3–1.2)
BILIRUBIN DIRECT: < 0.2 MG/DL (ref 0–0.3)
ERYTHROCYTE [DISTWIDTH] IN BLOOD BY AUTOMATED COUNT: 12.8 % (ref 11.5–14.5)
ERYTHROCYTE [DISTWIDTH] IN BLOOD BY AUTOMATED COUNT: 38.7 FL (ref 35–45)
HCT VFR BLD CALC: 43.6 % (ref 42–52)
HEMOGLOBIN: 14.5 GM/DL (ref 14–18)
MCH RBC QN AUTO: 28.2 PG (ref 26–33)
MCHC RBC AUTO-ENTMCNC: 33.3 GM/DL (ref 32.2–35.5)
MCV RBC AUTO: 84.8 FL (ref 80–94)
PLATELET # BLD: 262 THOU/MM3 (ref 130–400)
PMV BLD AUTO: 10.4 FL (ref 9.4–12.4)
RBC # BLD: 5.14 MILL/MM3 (ref 4.7–6.1)
TOTAL PROTEIN: 7.4 G/DL (ref 6.1–8)
WBC # BLD: 7.7 THOU/MM3 (ref 4.8–10.8)

## 2022-02-02 ENCOUNTER — OFFICE VISIT (OUTPATIENT)
Dept: UROLOGY | Age: 42
End: 2022-02-02
Payer: COMMERCIAL

## 2022-02-02 VITALS — BODY MASS INDEX: 39.65 KG/M2 | RESPIRATION RATE: 18 BRPM | HEIGHT: 70 IN | WEIGHT: 277 LBS

## 2022-02-02 DIAGNOSIS — E29.1 HYPOGONADISM IN MALE: Primary | ICD-10-CM

## 2022-02-02 PROCEDURE — G8417 CALC BMI ABV UP PARAM F/U: HCPCS | Performed by: UROLOGY

## 2022-02-02 PROCEDURE — G8484 FLU IMMUNIZE NO ADMIN: HCPCS | Performed by: UROLOGY

## 2022-02-02 PROCEDURE — 1036F TOBACCO NON-USER: CPT | Performed by: UROLOGY

## 2022-02-02 PROCEDURE — 99214 OFFICE O/P EST MOD 30 MIN: CPT | Performed by: UROLOGY

## 2022-02-02 PROCEDURE — G8427 DOCREV CUR MEDS BY ELIG CLIN: HCPCS | Performed by: UROLOGY

## 2022-02-02 RX ORDER — TESTOSTERONE 4 MG/D
4 PATCH TRANSDERMAL DAILY
Qty: 30 PATCH | Refills: 5 | Status: SHIPPED | OUTPATIENT
Start: 2022-02-02 | End: 2022-08-03 | Stop reason: ALTCHOICE

## 2022-02-02 RX ORDER — IBUPROFEN 200 MG
200 TABLET ORAL EVERY 6 HOURS PRN
COMMUNITY

## 2022-02-02 NOTE — PROGRESS NOTES
Dr. Fernando García MD  [facility])  Urology Clinic      Patient:  Dipika Valladares  YOB: 1980  Date: 2/2/2022    HISTORY OF PRESENT ILLNESS:   The patient is a 37 y.o. male who presents today for follow-up for the following problem(s): low testosterone. Last testosterone is still low at 146. PSA 0.44. Feels somewhat better on T patch. Overall the problem(s) : are worsening. Associated Symptoms: No dysuria, gross hematuria. Pain Severity: 0/10    Summary of old records: On androderm patch. Feeling well. Imaging/Labs reviewed during today's visit:  I have independently reviewed and verified the following films during today's visit. HCT of 43 and HGB of 14.5. Last several PSA's:  Lab Results   Component Value Date    PSA 0.44 01/15/2021    PSA 0.68 07/07/2020    PSA 0.37 02/15/2020       Last total testosterone:  Lab Results   Component Value Date    TESTOSTERONE 373 07/14/2021       Urinalysis today:  No results found for this visit on 02/02/22. Last BUN and creatinine:  Lab Results   Component Value Date    BUN 8 01/05/2019     Lab Results   Component Value Date    CREATININE 0.9 01/05/2019       Imaging Reviewed during this Office Visit:   (results were independently reviewed by physician and radiology report verified)    PAST MEDICAL, FAMILY AND SOCIAL HISTORY UPDATE:  Past Medical History:   Diagnosis Date    Azoospermia     Hypogonadism      Past Surgical History:   Procedure Laterality Date    CHOLECYSTECTOMY      stent placement as well.      Family History   Problem Relation Age of Onset    Cancer Mother         hodgkins    No Known Problems Father      Outpatient Medications Marked as Taking for the 2/2/22 encounter (Office Visit) with Fernando García MD   Medication Sig Dispense Refill    Fexofenadine HCl (ALLEGRA PO) Take by mouth daily      ibuprofen (ADVIL;MOTRIN) 200 MG tablet Take 200 mg by mouth every 6 hours as needed for Pain         Patient has no known allergies. Social History     Tobacco Use   Smoking Status Never Smoker   Smokeless Tobacco Never Used       Social History     Substance and Sexual Activity   Alcohol Use No       REVIEW OF SYSTEMS:  Constitutional: negative  Eyes: negative  Respiratory: negative  Cardiovascular: negative  Gastrointestinal: negative  Musculoskeletal: negative  Genitourinary: negative except for what is in HPI  Skin: negative   Neurological: negative  Hematological/Lymphatic: negative  Psychological: negative    Physical Exam:      Vitals:    02/02/22 0818   Resp: 18     Patient is a 37 y.o. male in no acute distress and alert and oriented to person, place and time. NAD, A/o  Non labored respiration  Normal peripheral pulses  Skin- warm and dry  Psych- normal mood and affect      Assessment and Plan      1. Hypogonadism in male           Plan:      No follow-ups on file. T still pending. Will review when it results. See back in 6 months  HCT normal at 43.           Dr. Jed Inman MD

## 2022-02-03 LAB — TESTOSTERONE TOTAL: 363 NG/DL (ref 300–890)

## 2022-02-23 DIAGNOSIS — E29.1 HYPOGONADISM IN MALE: ICD-10-CM

## 2022-02-23 NOTE — TELEPHONE ENCOUNTER
Patient is calling in to clarify his androderm patches. He said he was on 2 patches daily but at his last appt on 2/2/2022 with Dr Debby Peguero the new prescription was sent for 1 patch daily. He does not recall it being mentioned to change back to 1 patch. Please call him to verify, and ok to leave a voicemail if he does not answer.

## 2022-02-28 RX ORDER — TESTOSTERONE 4 MG/D
8 PATCH TRANSDERMAL DAILY
Qty: 60 PATCH | Refills: 5 | Status: SHIPPED | OUTPATIENT
Start: 2022-02-28 | End: 2022-08-03 | Stop reason: SDUPTHER

## 2022-03-03 ENCOUNTER — TELEPHONE (OUTPATIENT)
Dept: UROLOGY | Age: 42
End: 2022-03-03

## 2022-03-10 ENCOUNTER — TELEPHONE (OUTPATIENT)
Dept: UROLOGY | Age: 42
End: 2022-03-10

## 2022-03-10 NOTE — TELEPHONE ENCOUNTER
ATTEMPTED [facility]    Please advise the dispensing pharmacy to contact the [address] [place] [address]  at [phone] for assistance. CALLED [facility] TO GIVE ABOVE MESSAGE. SPOKE WITH JONH. AWAITING RESPONSE.

## 2022-08-01 ENCOUNTER — NURSE ONLY (OUTPATIENT)
Dept: LAB | Age: 42
End: 2022-08-01

## 2022-08-01 DIAGNOSIS — E29.1 HYPOGONADISM IN MALE: ICD-10-CM

## 2022-08-01 LAB
ERYTHROCYTE [DISTWIDTH] IN BLOOD BY AUTOMATED COUNT: 12.7 % (ref 11.5–14.5)
ERYTHROCYTE [DISTWIDTH] IN BLOOD BY AUTOMATED COUNT: 39.3 FL (ref 35–45)
HCT VFR BLD CALC: 43.6 % (ref 42–52)
HEMOGLOBIN: 14.3 GM/DL (ref 14–18)
MCH RBC QN AUTO: 28.3 PG (ref 26–33)
MCHC RBC AUTO-ENTMCNC: 32.8 GM/DL (ref 32.2–35.5)
MCV RBC AUTO: 86.2 FL (ref 80–94)
PLATELET # BLD: 233 THOU/MM3 (ref 130–400)
PMV BLD AUTO: 11.1 FL (ref 9.4–12.4)
RBC # BLD: 5.06 MILL/MM3 (ref 4.7–6.1)
WBC # BLD: 7.1 THOU/MM3 (ref 4.8–10.8)

## 2022-08-03 ENCOUNTER — OFFICE VISIT (OUTPATIENT)
Dept: UROLOGY | Age: 42
End: 2022-08-03
Payer: COMMERCIAL

## 2022-08-03 VITALS
HEIGHT: 70 IN | BODY MASS INDEX: 41.37 KG/M2 | WEIGHT: 289 LBS | DIASTOLIC BLOOD PRESSURE: 80 MMHG | SYSTOLIC BLOOD PRESSURE: 117 MMHG | HEART RATE: 77 BPM

## 2022-08-03 DIAGNOSIS — E29.1 HYPOGONADISM IN MALE: Primary | ICD-10-CM

## 2022-08-03 LAB — TESTOSTERONE TOTAL: 25 NG/DL (ref 300–890)

## 2022-08-03 PROCEDURE — G8417 CALC BMI ABV UP PARAM F/U: HCPCS | Performed by: UROLOGY

## 2022-08-03 PROCEDURE — 1036F TOBACCO NON-USER: CPT | Performed by: UROLOGY

## 2022-08-03 PROCEDURE — G8427 DOCREV CUR MEDS BY ELIG CLIN: HCPCS | Performed by: UROLOGY

## 2022-08-03 PROCEDURE — 99214 OFFICE O/P EST MOD 30 MIN: CPT | Performed by: UROLOGY

## 2022-08-03 RX ORDER — TESTOSTERONE 4 MG/D
8 PATCH TRANSDERMAL DAILY
Qty: 60 PATCH | Refills: 5 | Status: SHIPPED | OUTPATIENT
Start: 2022-08-03 | End: 2022-09-02

## 2023-01-30 ENCOUNTER — HOSPITAL ENCOUNTER (INPATIENT)
Age: 43
LOS: 8 days | Discharge: HOME | DRG: 193 | End: 2023-02-07
Attending: FAMILY MEDICINE | Admitting: INTERNAL MEDICINE
Payer: COMMERCIAL

## 2023-01-30 ENCOUNTER — HOSPITAL ENCOUNTER (OUTPATIENT)
Dept: GENERAL RADIOLOGY | Age: 43
Discharge: HOME | End: 2023-01-30
Payer: COMMERCIAL

## 2023-01-30 ENCOUNTER — HOSPITAL ENCOUNTER (OUTPATIENT)
Age: 43
Discharge: HOME | DRG: 193 | End: 2023-01-30
Payer: COMMERCIAL

## 2023-01-30 ENCOUNTER — OFFICE VISIT (OUTPATIENT)
Dept: FAMILY MEDICINE CLINIC | Age: 43
End: 2023-01-30
Payer: COMMERCIAL

## 2023-01-30 VITALS
TEMPERATURE: 98.5 F | DIASTOLIC BLOOD PRESSURE: 72 MMHG | RESPIRATION RATE: 18 BRPM | WEIGHT: 299 LBS | BODY MASS INDEX: 42.9 KG/M2 | OXYGEN SATURATION: 93 % | HEART RATE: 132 BPM | SYSTOLIC BLOOD PRESSURE: 116 MMHG

## 2023-01-30 DIAGNOSIS — R05.1 ACUTE COUGH: ICD-10-CM

## 2023-01-30 DIAGNOSIS — R40.0 DAYTIME SLEEPINESS: ICD-10-CM

## 2023-01-30 DIAGNOSIS — R06.83 SNORING: ICD-10-CM

## 2023-01-30 DIAGNOSIS — J40 BRONCHITIS: ICD-10-CM

## 2023-01-30 DIAGNOSIS — J90 PLEURAL EFFUSION, RIGHT: ICD-10-CM

## 2023-01-30 DIAGNOSIS — J18.9 PNEUMONIA OF RIGHT LOWER LOBE DUE TO INFECTIOUS ORGANISM: Primary | ICD-10-CM

## 2023-01-30 DIAGNOSIS — G47.30 SLEEP APNEA, UNSPECIFIED TYPE: ICD-10-CM

## 2023-01-30 DIAGNOSIS — R06.02 SOB (SHORTNESS OF BREATH): ICD-10-CM

## 2023-01-30 DIAGNOSIS — R06.02 SOB (SHORTNESS OF BREATH): Primary | ICD-10-CM

## 2023-01-30 LAB
ALBUMIN SERPL BCG-MCNC: 4.1 G/DL (ref 3.5–5.1)
ALP SERPL-CCNC: 77 U/L (ref 38–126)
ALT SERPL W/O P-5'-P-CCNC: 20 U/L (ref 11–66)
ANION GAP SERPL CALC-SCNC: 11 MEQ/L (ref 8–16)
ANION GAP SERPL CALC-SCNC: 13 MEQ/L (ref 8–16)
AST SERPL-CCNC: 12 U/L (ref 5–40)
BASOPHILS # BLD: 0.1 % (ref 0–3)
BASOPHILS ABSOLUTE: 0 THOU/MM3 (ref 0–0.1)
BASOPHILS ABSOLUTE: 0.1 THOU/MM3 (ref 0–0.1)
BASOPHILS NFR BLD AUTO: 0.3 %
BILIRUB CONJ SERPL-MCNC: 0.5 MG/DL (ref 0–0.3)
BILIRUB SERPL-MCNC: 1.5 MG/DL (ref 0.3–1.2)
BUN SERPL-MCNC: 15 MG/DL (ref 7–22)
BUN SERPL-MCNC: 16 MG/DL (ref 7–18)
CALCIUM SERPL-MCNC: 8.9 MG/DL (ref 8.5–10.1)
CALCIUM SERPL-MCNC: 9.4 MG/DL (ref 8.5–10.5)
CHLORIDE SERPL-SCNC: 100 MEQ/L (ref 98–107)
CHLORIDE SERPL-SCNC: 100 MEQ/L (ref 98–111)
CO2 SERPL-SCNC: 23 MEQ/L (ref 23–33)
CO2 SERPL-SCNC: 26 MEQ/L (ref 21–32)
CREAT SERPL-MCNC: 0.8 MG/DL (ref 0.4–1.2)
CREAT SERPL-MCNC: 1.1 MG/DL (ref 0.6–1.3)
DEPRECATED RDW RBC AUTO: 38.6 FL (ref 35–45)
EOSINOPHIL NFR BLD AUTO: 0.3 %
EOSINOPHILS ABSOLUTE: 0 THOU/MM3 (ref 0–0.5)
EOSINOPHILS ABSOLUTE: 0.1 THOU/MM3 (ref 0–0.4)
EOSINOPHILS RELATIVE PERCENT: 0.1 % (ref 0–4)
ERYTHROCYTE [DISTWIDTH] IN BLOOD BY AUTOMATED COUNT: 13 % (ref 11.5–14.5)
FLUAV RNA RESP QL NAA+PROBE: NOT DETECTED
FLUBV RNA RESP QL NAA+PROBE: NOT DETECTED
GFR SERPL CREATININE-BSD FRML MDRD: > 60 ML/MIN/1.73M2
GFR SERPL CREATININE-BSD FRML MDRD: > 60 ML/MIN/1.73M2
GLUCOSE SERPL-MCNC: 111 MG/DL (ref 70–108)
GLUCOSE SERPL-MCNC: 112 MG/DL (ref 74–106)
HCT VFR BLD AUTO: 42.1 % (ref 42–52)
HCT VFR BLD CALC: 40.9 % (ref 42–52)
HEMOGLOBIN: 13.9 GM/DL (ref 14–18)
HGB BLD-MCNC: 14 GM/DL (ref 14–18)
IMM GRANULOCYTES # BLD AUTO: 0.21 THOU/MM3 (ref 0–0.07)
IMM GRANULOCYTES NFR BLD AUTO: 0.9 %
IMMATURE GRANS (ABS): 0.1 THOU/MM3 (ref 0–0.07)
IMMATURE GRANULOCYTES: 1 %
LACTIC ACID, SEPSIS: 1.6 MMOL/L (ref 0.5–1.9)
LIPASE SERPL-CCNC: 19.5 U/L (ref 5.6–51.3)
LYMPHOCYTES # BLD AUTO: 7 % (ref 15–47)
LYMPHOCYTES ABSOLUTE: 1.5 THOU/MM3 (ref 1–4.8)
LYMPHOCYTES ABSOLUTE: 1.7 THOU/MM3 (ref 1–4.8)
LYMPHOCYTES NFR BLD AUTO: 7.1 %
MCH RBC QN AUTO: 27.2 PG (ref 26–33)
MCH RBC QN AUTO: 27.6 PG (ref 26–32)
MCHC RBC AUTO-ENTMCNC: 33.3 GM/DL (ref 32.2–35.5)
MCHC RBC AUTO-ENTMCNC: 34 GM/DL (ref 31–35)
MCV RBC AUTO: 81.2 FL (ref 80–94)
MCV RBC AUTO: 81.7 FL (ref 80–94)
MONOCYTES ABSOLUTE: 1.5 THOU/MM3 (ref 0.4–1.3)
MONOCYTES NFR BLD AUTO: 6.3 %
MONOCYTES: 1.1 THOU/MM3 (ref 0.3–1.3)
MONOCYTES: 5.2 % (ref 0–12)
NEUTROPHILS NFR BLD AUTO: 85.1 %
NRBC BLD AUTO-RTO: 0 /100 WBC
NT-PROBNP SERPL IA-MCNC: 50.1 PG/ML (ref 0–124)
OSMOLALITY SERPL CALC.SUM OF ELEC: 273.5 MOSMOL/KG (ref 275–300)
PDW BLD-RTO: 12.7 % (ref 11.5–14.9)
PLATELET # BLD AUTO: 388 THOU/MM3 (ref 130–400)
PLATELET # BLD AUTO: 394 THOU/MM3 (ref 130–400)
PLATELET BLD QL SMEAR: ADEQUATE
PMV BLD AUTO: 8.9 FL (ref 9.4–12.4)
PMV BLD AUTO: 9.1 FL (ref 9.4–12.4)
POLYCHROMASIA BLD QL SMEAR: ABNORMAL
POTASSIUM SERPL-SCNC: 3.7 MEQ/L (ref 3.5–5.1)
POTASSIUM SERPL-SCNC: 3.7 MEQ/L (ref 3.5–5.2)
PROCALCITONIN SERPL IA-MCNC: 1.41 NG/ML (ref 0.01–0.09)
PROT SERPL-MCNC: 7.9 G/DL (ref 6.1–8)
RBC # BLD AUTO: 5.15 MILL/MM3 (ref 4.7–6.1)
RBC # BLD: 5.04 MILL/MM3 (ref 4.5–6.1)
SARS-COV-2 RNA RESP QL NAA+PROBE: NOT DETECTED
SCAN OF BLOOD SMEAR: NORMAL
SEG NEUTROPHILS: 87.1 % (ref 43–75)
SEGMENTED NEUTROPHILS ABSOLUTE COUNT: 18.6 THOU/MM3 (ref 1.8–7.7)
SEGMENTED NEUTROPHILS ABSOLUTE COUNT: 20.5 THOU/MM3 (ref 1.8–7.7)
SODIUM SERPL-SCNC: 136 MEQ/L (ref 135–145)
SODIUM SERPL-SCNC: 137 MEQ/L (ref 136–145)
TROPONIN T: < 0.01 NG/ML
WBC # BLD AUTO: 24.1 THOU/MM3 (ref 4.8–10.8)
WBC # BLD: 21.3 THOU/MM3 (ref 4.8–10.8)

## 2023-01-30 PROCEDURE — 96372 THER/PROPH/DIAG INJ SC/IM: CPT | Performed by: NURSE PRACTITIONER

## 2023-01-30 PROCEDURE — 83605 ASSAY OF LACTIC ACID: CPT

## 2023-01-30 PROCEDURE — 36415 COLL VENOUS BLD VENIPUNCTURE: CPT

## 2023-01-30 PROCEDURE — 2580000003 HC RX 258: Performed by: STUDENT IN AN ORGANIZED HEALTH CARE EDUCATION/TRAINING PROGRAM

## 2023-01-30 PROCEDURE — 83880 ASSAY OF NATRIURETIC PEPTIDE: CPT

## 2023-01-30 PROCEDURE — 87636 SARSCOV2 & INF A&B AMP PRB: CPT

## 2023-01-30 PROCEDURE — 99285 EMERGENCY DEPT VISIT HI MDM: CPT

## 2023-01-30 PROCEDURE — 96374 THER/PROPH/DIAG INJ IV PUSH: CPT

## 2023-01-30 PROCEDURE — 83690 ASSAY OF LIPASE: CPT

## 2023-01-30 PROCEDURE — 6370000000 HC RX 637 (ALT 250 FOR IP): Performed by: FAMILY MEDICINE

## 2023-01-30 PROCEDURE — 71046 X-RAY EXAM CHEST 2 VIEWS: CPT

## 2023-01-30 PROCEDURE — 80053 COMPREHEN METABOLIC PANEL: CPT

## 2023-01-30 PROCEDURE — G8484 FLU IMMUNIZE NO ADMIN: HCPCS | Performed by: NURSE PRACTITIONER

## 2023-01-30 PROCEDURE — G8427 DOCREV CUR MEDS BY ELIG CLIN: HCPCS | Performed by: NURSE PRACTITIONER

## 2023-01-30 PROCEDURE — G8417 CALC BMI ABV UP PARAM F/U: HCPCS | Performed by: NURSE PRACTITIONER

## 2023-01-30 PROCEDURE — 85025 COMPLETE CBC W/AUTO DIFF WBC: CPT

## 2023-01-30 PROCEDURE — 84145 PROCALCITONIN (PCT): CPT

## 2023-01-30 PROCEDURE — 84484 ASSAY OF TROPONIN QUANT: CPT

## 2023-01-30 PROCEDURE — 6360000002 HC RX W HCPCS: Performed by: FAMILY MEDICINE

## 2023-01-30 PROCEDURE — 82248 BILIRUBIN DIRECT: CPT

## 2023-01-30 PROCEDURE — 87040 BLOOD CULTURE FOR BACTERIA: CPT

## 2023-01-30 PROCEDURE — 2060000000 HC ICU INTERMEDIATE R&B

## 2023-01-30 PROCEDURE — 99214 OFFICE O/P EST MOD 30 MIN: CPT | Performed by: NURSE PRACTITIONER

## 2023-01-30 PROCEDURE — 93005 ELECTROCARDIOGRAM TRACING: CPT | Performed by: FAMILY MEDICINE

## 2023-01-30 PROCEDURE — 1036F TOBACCO NON-USER: CPT | Performed by: NURSE PRACTITIONER

## 2023-01-30 RX ORDER — FENTANYL CITRATE 50 UG/ML
25 INJECTION, SOLUTION INTRAMUSCULAR; INTRAVENOUS ONCE
Status: COMPLETED | OUTPATIENT
Start: 2023-01-30 | End: 2023-01-30

## 2023-01-30 RX ORDER — ACETAMINOPHEN 500 MG
1000 TABLET ORAL ONCE
Status: COMPLETED | OUTPATIENT
Start: 2023-01-30 | End: 2023-01-30

## 2023-01-30 RX ORDER — LEVOFLOXACIN 750 MG/1
750 TABLET ORAL DAILY
Qty: 7 TABLET | Refills: 0 | Status: ON HOLD | OUTPATIENT
Start: 2023-01-30 | End: 2023-02-06

## 2023-01-30 RX ORDER — 0.9 % SODIUM CHLORIDE 0.9 %
1000 INTRAVENOUS SOLUTION INTRAVENOUS ONCE
Status: COMPLETED | OUTPATIENT
Start: 2023-01-30 | End: 2023-01-30

## 2023-01-30 RX ADMIN — FENTANYL CITRATE 25 MCG: 50 INJECTION, SOLUTION INTRAMUSCULAR; INTRAVENOUS at 21:37

## 2023-01-30 RX ADMIN — SODIUM CHLORIDE 1000 ML: 9 INJECTION, SOLUTION INTRAVENOUS at 18:13

## 2023-01-30 RX ADMIN — ACETAMINOPHEN 1000 MG: 500 TABLET ORAL at 21:36

## 2023-01-30 SDOH — ECONOMIC STABILITY: TRANSPORTATION INSECURITY
IN THE PAST 12 MONTHS, HAS THE LACK OF TRANSPORTATION KEPT YOU FROM MEDICAL APPOINTMENTS OR FROM GETTING MEDICATIONS?: NO

## 2023-01-30 SDOH — ECONOMIC STABILITY: TRANSPORTATION INSECURITY
IN THE PAST 12 MONTHS, HAS LACK OF TRANSPORTATION KEPT YOU FROM MEETINGS, WORK, OR FROM GETTING THINGS NEEDED FOR DAILY LIVING?: NO

## 2023-01-30 SDOH — ECONOMIC STABILITY: FOOD INSECURITY: WITHIN THE PAST 12 MONTHS, THE FOOD YOU BOUGHT JUST DIDN'T LAST AND YOU DIDN'T HAVE MONEY TO GET MORE.: NEVER TRUE

## 2023-01-30 SDOH — ECONOMIC STABILITY: FOOD INSECURITY: WITHIN THE PAST 12 MONTHS, YOU WORRIED THAT YOUR FOOD WOULD RUN OUT BEFORE YOU GOT MONEY TO BUY MORE.: NEVER TRUE

## 2023-01-30 ASSESSMENT — PAIN DESCRIPTION - LOCATION
LOCATION: GENERALIZED
LOCATION: CHEST
LOCATION: GENERALIZED

## 2023-01-30 ASSESSMENT — PATIENT HEALTH QUESTIONNAIRE - PHQ9
2. FEELING DOWN, DEPRESSED OR HOPELESS: 0
SUM OF ALL RESPONSES TO PHQ QUESTIONS 1-9: 0
SUM OF ALL RESPONSES TO PHQ9 QUESTIONS 1 & 2: 0
SUM OF ALL RESPONSES TO PHQ QUESTIONS 1-9: 0
1. LITTLE INTEREST OR PLEASURE IN DOING THINGS: 0

## 2023-01-30 ASSESSMENT — PAIN SCALES - GENERAL
PAINLEVEL_OUTOF10: 6
PAINLEVEL_OUTOF10: 2
PAINLEVEL_OUTOF10: 6

## 2023-01-30 ASSESSMENT — PAIN - FUNCTIONAL ASSESSMENT
PAIN_FUNCTIONAL_ASSESSMENT: 0-10
PAIN_FUNCTIONAL_ASSESSMENT: 0-10

## 2023-01-30 ASSESSMENT — PAIN DESCRIPTION - DESCRIPTORS: DESCRIPTORS: DISCOMFORT

## 2023-01-30 ASSESSMENT — SOCIAL DETERMINANTS OF HEALTH (SDOH): HOW HARD IS IT FOR YOU TO PAY FOR THE VERY BASICS LIKE FOOD, HOUSING, MEDICAL CARE, AND HEATING?: NOT HARD AT ALL

## 2023-01-30 NOTE — LETTER
[facility] STEPDOWN TELEMETRY 4K  [address] [facility] [address]  Phone: [phone]          February 7, 2023     Patient: Marquise Lou   YOB: 1980   Date of Visit: 1/30/2023       To Whom It May Concern: It is my medical opinion that Broderick Pena may return to work on Monday, February 13, 2023. If you have any questions or concerns, please don't hesitate to call.     Sincerely,    Tony Fierro MD

## 2023-01-30 NOTE — ED NOTES
Patient resting in bed. Patient appears to be more comfortable.  Call light within reach, side rails up x2     Deonna Lan RN  01/30/23 3538

## 2023-01-30 NOTE — ED NOTES
Patient to ED for increased SOB. Patient states he developed SOB Sunday evening along with fevers. Patient continued to have labored breathing throughout the day today and was seen by his PCP. PCP ordered a chest xray with lab work that showed pneumonia. Patient was given a ATB injection and told to come to ER. patient reports having pneumonia back in December. patient alert and oriented with labored breathing on arrival. Patient placed on 2L NC for an SP02 of 90%.  Significant other a bedside      Debo Singh RN  01/30/23 [address] [place] [address] Cheyenne Parham RN  01/30/23 [facility]

## 2023-01-30 NOTE — ED PROVIDER NOTES
[address] [facility] [address] [address] EMERGENCY DEPT        Pt Name: Marquise Lou  MRN: [id number]  Martirgfcheri 1980  Date of evaluation: 1/30/2023  Treating Resident Physician: Larry Brown MD  Supervising Physician: Dr. Kasie Zambrano MD    [address]       Chief Complaint   Patient presents with    Shortness of Breath           HISTORY OF PRESENT ILLNESS & REVIEW OF SYSTEMS   HPI    History obtained from patient and family member at bedside. Marquise Lou is a 40 y.o. male who presents to the emergency department for evaluation of shortness of breath. Patient states that he began having some shortness of breath yesterday. Also states he had a temperature of 102 yesterday. States has been alternating Tylenol and Motrin and has not had recurrence of his fever. States he has a cough that is dry. Denies any nausea vomiting diarrhea constipation leg swelling. Mentions little bit of chest pain with the shortness of breath. States that he had pneumonia in December. States that his appetite has been decreased. Denies any history of diabetes. States that he went to his PCP earlier today and was diagnosed with pneumonia and given a shot of antibiotics told to come here. Denies any previous MI CVA or blood clot. Patient denies any new Headache, Chills, Abdominal pain, Nausea, Vomiting, Diarrhea, Constipation, and Leg swelling. The patient has no other acute complaints at this time. Review of systems as above. PAST MEDICAL AND SURGICAL HISTORY     Past Medical History:   Diagnosis Date    Azoospermia     Hypogonadism      Past Surgical History:   Procedure Laterality Date    CHOLECYSTECTOMY      stent placement as well.      Patient Active Problem List   Diagnosis Code    Hypogonad[facility]ogonadism syndrome, [facility] E23.0    Hypogonadism in male E29.1    Mixed hyperlipidemia E78.2    Pneumonia due to organism J18.9         MEDICATIONS   No current facility-administered medications for this encounter. Current Outpatient Medications:     levoFLOXacin (LEVAQUIN) 750 MG tablet, Take 1 tablet by mouth daily for 7 days, Disp: 7 tablet, Rfl: 0    ANDRODERM 4 MG/24HR PT24, Place 8 mg onto the skin daily for 30 days. Two patches daily. , Disp: 60 patch, Rfl: 5    Fexofenadine HCl (ALLEGRA PO), Take by mouth daily (Patient not taking: Reported on 1/30/2023), Disp: , Rfl:     ibuprofen (ADVIL;MOTRIN) 200 MG tablet, Take 200 mg by mouth every 6 hours as needed for Pain (Patient not taking: Reported on 1/30/2023), Disp: , Rfl:   Previous Medications    ANDRODERM 4 MG/24HR PT24    Place 8 mg onto the skin daily for 30 days. Two patches daily. FEXOFENADINE HCL (ALLEGRA PO)    Take by mouth daily    IBUPROFEN (ADVIL;MOTRIN) 200 MG TABLET    Take 200 mg by mouth every 6 hours as needed for Pain    LEVOFLOXACIN (LEVAQUIN) 750 MG TABLET    Take 1 tablet by mouth daily for 7 days         SOCIAL HISTORY     Social History     Social History Narrative    Not on file     Social History     Tobacco Use    Smoking status: Never    Smokeless tobacco: Never   Vaping Use    Vaping Use: Never used   Substance Use Topics    Alcohol use: No    Drug use: No         ALLERGIES   No Known Allergies      FAMILY HISTORY     Family History   Problem Relation Age of Onset    Cancer Mother         hodgkins    No Known Problems Father          PREVIOUS RECORDS   Previous records reviewed:  Patient was seen earlier today at outpatient family medicine for pneumonia. PHYSICAL EXAM     ED Triage Vitals [01/30/23 1747]   BP Temp Temp Source Heart Rate Resp SpO2 Height Weight   128/69 99 °F (37.2 °C) Oral (!) 135 (!) 40 90 % 5' 10\" (1.778 m) 297 lb (134.7 kg)     Initial vital signs and nursing assessment reviewed and vitals are/show: Afebrile, Normotensive, Tachycardic, and Tachypneic. Pulsoximetry is abnormal per my interpretation.     Additional Vital Signs:  Vitals:    01/30/23 1955   BP: (!) 158/82   Pulse: (!) 128   Resp: (!) 32 Temp:    SpO2: 97%       Physical Exam  Constitutional:       General: He is not in acute distress. Appearance: Normal appearance. He is obese. He is diaphoretic. He is not ill-appearing or toxic-appearing. HENT:      Head: Normocephalic and atraumatic. Right Ear: External ear normal.      Left Ear: External ear normal.   Eyes:      General: No scleral icterus. Right eye: No discharge. Left eye: No discharge. Cardiovascular:      Rate and Rhythm: Regular rhythm. Tachycardia present. Pulmonary:      Effort: No respiratory distress. Breath sounds: No stridor. No wheezing, rhonchi or rales. Comments: Tachypnea  Decreased breath sounds bilaterally  Chest:      Chest wall: No tenderness. Abdominal:      General: Abdomen is flat. There is no distension. Palpations: Abdomen is soft. Tenderness: There is no abdominal tenderness. There is no guarding or rebound. Musculoskeletal:      Cervical back: Neck supple. Right lower leg: No edema. Left lower leg: No edema. Skin:     General: Skin is warm. Neurological:      Mental Status: He is alert and oriented to person, place, and time. Mental status is at baseline. Psychiatric:         Mood and Affect: Mood normal.         Behavior: Behavior normal.         Thought Content: Thought content normal.         Judgment: Judgment normal.           MEDICAL DECISION MAKING/ED COURSE   Initial Assessment:     40 y.o. male with a past medical history of obesity, hyperlipidemia presenting to the ED for shortness of breath.     Differential diagnoses include but not limited to: Pneumonia effusion empyema COVID influenza viral illness sepsis ACS arrhythmia electrolyte abnormality dehydration CHF COPD     Plan:       EKG  Labs  Imaging  IV Fluids  Received 1g Rocephin IM at outpatient PCP  Admission      Brief Summary of Patient Presentation:    Patient is a 40 y.o. male with a past medical history of obesity and hyperlipidemia who was seen and evaluated in the emergency department for pneumonia. Patient was saturating 89 to 90% on room air so he was placed on 2 L supplemental oxygen. He was tachycardic and had some tachypnea. He had some increased work of breathing. His symptoms improved in the ED. We given some IV fluids. Outpatient imaging demonstrated a right sided pleural effusion with infiltrate. His white count was significantly elevated as well as procalcitonin also suggesting he has pneumonia. His lactic acid was normal though. He had received 1 g of Rocephin at the outpatient PCP so we did not give him additional antibiotics here. Attending physician performed a bedside ultrasound which showed that his pleural effusion is likely loculated so would be very difficult to perform a thoracentesis in the ED that would be significantly beneficial.  After discussion with patient, through shared decision-making, patient is admitted for further management of his pneumonia and effusion. Discussed with the plan with the admitting service who agreed. The patient was seen and examined. Appropriate diagnostic testing was performed and results reviewed with the patient. My independent review and interpretation of data, imaging, labs and diagnostic evaluations are as above and in the ED Course. Previous patient encounter documents & history available on EMR was reviewed  Case discussed with consulting clinician:  admitting service  Shared Decision-Making was performed and disposition discussed with the Patient/Family and questions answered. MDM  /  ED Course as of 01/30/23 2011 Mon Jan 30, 2023   1811 Outpatient CXR:Large right pleural effusion and suspected underlying airspace infiltrates.  [CR]   8835 CBC shows a WBC of 24.1  BMP unremarkable  Lipase within normal limits  Lactic acid 1.6  Hepatic function panel shows total bilirubin 1.5 and a direct bilirubin 0.5. [CR]   1904 EKG shows sinus tachycardia with a HR of 135 [CR]   1925 CBC shows a WBC of 24.1  BMP unremarkable  Back function panel shows a total bilirubin 1.5 direct bilirubin 0.5  Lactic acid 1.6  Troponin lipase BNP within normal limits  COVID influenza negative  Procalcitonin 1.41 [CR]   1930 Paged [facility] for admission [CR]   1944 Sent message to Dr Davin Lemos MD for admission who forwarded to Dr Cheryl Grey. [CR]      ED Course User Index  [CR] Danilo Woodward MD       ED COURSE:  ED Course as of 01/30/23 2011 Mon Jan 30, 2023 1811 Outpatient CXR:Large right pleural effusion and suspected underlying airspace infiltrates. [CR]   4396 CBC shows a WBC of 24.1  BMP unremarkable  Lipase within normal limits  Lactic acid 1.6  Hepatic function panel shows total bilirubin 1.5 and a direct bilirubin 0.5. [CR]   1904 EKG shows sinus tachycardia with a HR of 135 [CR]   1925 CBC shows a WBC of 24.1  BMP unremarkable  Back function panel shows a total bilirubin 1.5 direct bilirubin 0.5  Lactic acid 1.6  Troponin lipase BNP within normal limits  COVID influenza negative  Procalcitonin 1.41 [CR]   1930 Paged [facility] for admission [CR]   1944 Sent message to Dr Davin Lemos MD for admission who forwarded to Dr Cheryl Grey. [CR]      ED Course User Index  [CR] Danilo Woodward MD                 ED Medications administered this visit:  (None if blank)  Medications   0.9 % sodium chloride bolus (1,000 mLs IntraVENous New Bag 1/30/23 1813)         PROCEDURES: (None if blank)  Procedures:     CRITICAL CARE: (None if blank)      DISCHARGE PRESCRIPTIONS: (None if blank)  New Prescriptions    No medications on file       FORMAL DIAGNOSTIC RESULTS     RADIOLOGY: Interpretation per the Radiologist below, if available at the time of this note (none if blank):     No orders to display       LABS: (none if blank)  Labs Reviewed   BASIC METABOLIC PANEL - Abnormal; Notable for the following components:       Result Value    Glucose 111 (*)     All other components within normal limits   CBC WITH AUTO DIFFERENTIAL - Abnormal; Notable for the following components:    WBC 24.1 (*)     MPV 9.1 (*)     Segs Absolute 20.5 (*)     Monocytes Absolute 1.5 (*)     Immature Grans (Abs) 0.21 (*)     All other components within normal limits   HEPATIC FUNCTION PANEL - Abnormal; Notable for the following components: Total Bilirubin 1.5 (*)     Bilirubin, Direct 0.5 (*)     All other components within normal limits   PROCALCITONIN - Abnormal; Notable for the following components:    Procalcitonin 1.41 (*)     All other components within normal limits   OSMOLALITY - Abnormal; Notable for the following components:    Osmolality Calc 273.5 (*)     All other components within normal limits   COVID-19 & INFLUENZA COMBO   CULTURE, BLOOD 1   CULTURE, BLOOD 2   BRAIN NATRIURETIC PEPTIDE   LIPASE   TROPONIN   LACTATE, SEPSIS   GLOMERULAR FILTRATION RATE, ESTIMATED   ANION GAP   SCAN OF BLOOD SMEAR                MEDICATION CHANGES     New Prescriptions    No medications on file       FINAL IMPRESSION      1. Pneumonia of right lower lobe due to infectious organism    2. Pleural effusion, right          FINAL DISPOSITION     Final diagnoses:   Pneumonia of right lower lobe due to infectious organism   Pleural effusion, right     Condition: condition: stable  Dispo: Admit to med/surg floor      This transcription was electronically signed. Parts of this transcriptions may have been dictated by use of voice recognition software and electronically transcribed, and parts may have been transcribed with the assistance of an ED scribe. The transcription may contain errors not detected in proofreading. Please refer to my supervising physician's documentation if my documentation differs.     Electronically Signed: Iftikhar Kirby MD, 01/30/23, 8:11 PM          Iftikhar Kirby MD  Resident  01/30/23 2011

## 2023-01-30 NOTE — PROGRESS NOTES
Stanislav Nurse (:  1980) is a 40 y.o. male,Established patient, here for evaluation of the following chief complaint(s):  Congestion, Cough, and Chest Congestion (Chest tightness off and on every 2 weeks for over a month)         ASSESSMENT/PLAN:  1. SOB (shortness of breath)  -     XR CHEST (2 VW); Future  -     CBC with Auto Differential; Future  -     Basic Metabolic Panel; Future  2. Acute cough  -     XR CHEST (2 VW); Future  -     CBC with Auto Differential; Future  -     Basic Metabolic Panel; Future  3. Bronchitis  -     cefTRIAXone (ROCEPHIN) 1,000 mg in lidocaine 1 % 2.86 mL IM Injection; 1,000 mg, IntraMUSCular, ONCE, On 23 at 1545, For 1 dose1 g vial - Add 2.1 mL of 1% lidocaine to make a concentration of 350 mg/mL. 500 mg vial - Add 1 mL of 1 % lidocaine to make a concentration of 350 mg/mL. At this time I did go ahead and give him Rocephin 1 g IM. I sent a prescription in for Levaquin 750 mg once daily for 7 days. If he is to get worse he is to go to ER. He is to obtain a chest x-ray ASAP. And use Tylenol for any discomfort or fever. After the chest x-ray was done it did show a large pleural effusion/pneumonia. I did instruct him to go to ER ASAP. Return in about 1 week (around 2023). Subjective   SUBJECTIVE/OBJECTIVE:  HPI    This is a 61-year-old male who I am seeing today for shortness of breath and cough. The patient states around [place] time he did have pneumonia. He was given antibiotic for 4 days and it did improve. He did end up going to the ER again in the middle of January and a chest x-ray was negative. Patient states yesterday he started with a sudden onset of shortness of breath and chest tightness. He does have some significant pain and worsening shortness of breath today. It is hard to breathe. He does have fever of 102. He is very congested as well. He has not been trying anything over-the-counter.   He does have a history of pneumonia multiple times. He denies smoking. Review of Systems   Constitutional:  Positive for fever. Negative for activity change, appetite change, chills, diaphoresis, fatigue and unexpected weight change. HENT:  Positive for congestion. Negative for ear pain, postnasal drip, sinus pressure and sore throat. Eyes:  Negative for visual disturbance. Respiratory:  Positive for cough and shortness of breath. Negative for chest tightness, wheezing and stridor. Cardiovascular:  Negative for chest pain, palpitations and leg swelling. Gastrointestinal:  Negative for abdominal distention, abdominal pain, constipation, diarrhea, nausea and vomiting. Endocrine: Negative for polydipsia, polyphagia and polyuria. Genitourinary:  Negative for decreased urine volume, difficulty urinating, dysuria, flank pain, frequency, hematuria and urgency. Musculoskeletal:  Negative for arthralgias, back pain, gait problem, joint swelling, myalgias and neck pain. Skin:  Negative for color change, pallor and rash. Neurological:  Negative for dizziness, syncope, weakness, light-headedness, numbness and headaches. Hematological:  Negative for adenopathy. Psychiatric/Behavioral:  Negative for behavioral problems, self-injury and sleep disturbance. The patient is not nervous/anxious. Objective   Physical Exam  Vitals reviewed. Constitutional:       General: He is not in acute distress. Appearance: Normal appearance. He is well-developed. HENT:      Head: Normocephalic. Right Ear: External ear normal.      Left Ear: External ear normal.      Nose: Nose normal.      Right Sinus: No maxillary sinus tenderness. Left Sinus: No maxillary sinus tenderness. Mouth/Throat:      Mouth: Mucous membranes are moist.      Pharynx: Oropharynx is clear. Uvula midline. No oropharyngeal exudate or posterior oropharyngeal erythema.    Neck:      Trachea: Trachea normal.   Cardiovascular:      Rate and Rhythm: Normal rate and regular rhythm. Heart sounds: Normal heart sounds. No murmur heard. Pulmonary:      Effort: Pulmonary effort is normal. Tachypnea present. No respiratory distress. Breath sounds: Examination of the right-middle field reveals decreased breath sounds. Examination of the right-lower field reveals decreased breath sounds. Decreased breath sounds present. No wheezing, rhonchi or rales. Chest:      Chest wall: No tenderness. Abdominal:      General: There is no distension. Palpations: Abdomen is soft. There is no mass. Tenderness: There is no abdominal tenderness. Musculoskeletal:         General: No tenderness or deformity. Normal range of motion. Cervical back: Normal range of motion and neck supple. Lymphadenopathy:      Cervical: No cervical adenopathy. Skin:     General: Skin is warm and dry. Neurological:      Mental Status: He is alert and oriented to person, place, and time. Motor: No abnormal muscle tone. Coordination: Coordination normal.      Gait: Gait normal.   Psychiatric:         Mood and Affect: Mood normal.         Behavior: Behavior normal.         Thought Content: Thought content normal.         Judgment: Judgment normal.          On this date 1/30/2023 I have spent 31 minutes reviewing previous notes, test results and face to face with the patient discussing the diagnosis and importance of compliance with the treatment plan as well as documenting on the day of the visit. An electronic signature was used to authenticate this note.     --EFREN Harper - CNP

## 2023-01-31 ENCOUNTER — APPOINTMENT (OUTPATIENT)
Dept: ULTRASOUND IMAGING | Age: 43
DRG: 193 | End: 2023-01-31
Payer: COMMERCIAL

## 2023-01-31 ENCOUNTER — APPOINTMENT (OUTPATIENT)
Dept: GENERAL RADIOLOGY | Age: 43
DRG: 193 | End: 2023-01-31
Payer: COMMERCIAL

## 2023-01-31 LAB
ALBUMIN FLD-MCNC: 3.4 GM/DL
ALBUMIN SERPL BCG-MCNC: 3.8 G/DL (ref 3.5–5.1)
ANION GAP SERPL CALC-SCNC: 12 MEQ/L (ref 8–16)
BASOPHILS ABSOLUTE: 0.1 THOU/MM3 (ref 0–0.1)
BASOPHILS NFR BLD AUTO: 0.3 %
BUN SERPL-MCNC: 13 MG/DL (ref 7–22)
CALCIUM SERPL-MCNC: 8.6 MG/DL (ref 8.5–10.5)
CHLORIDE SERPL-SCNC: 100 MEQ/L (ref 98–111)
CO2 SERPL-SCNC: 22 MEQ/L (ref 23–33)
CREAT SERPL-MCNC: 0.7 MG/DL (ref 0.4–1.2)
DEPRECATED RDW RBC AUTO: 38.8 FL (ref 35–45)
EOSINOPHIL NFR BLD AUTO: 0.2 %
EOSINOPHILS ABSOLUTE: 0.1 THOU/MM3 (ref 0–0.4)
ERYTHROCYTE [DISTWIDTH] IN BLOOD BY AUTOMATED COUNT: 13.1 % (ref 11.5–14.5)
GFR SERPL CREATININE-BSD FRML MDRD: > 60 ML/MIN/1.73M2
GLUCOSE FLD-MCNC: < 2 MG/DL
GLUCOSE SERPL-MCNC: 118 MG/DL (ref 70–108)
HCT VFR BLD AUTO: 40.6 % (ref 42–52)
HGB BLD-MCNC: 13.5 GM/DL (ref 14–18)
IMM GRANULOCYTES # BLD AUTO: 0.31 THOU/MM3 (ref 0–0.07)
IMM GRANULOCYTES NFR BLD AUTO: 1.2 %
INR PPP: 1.42 (ref 0.85–1.13)
L PNEUMO1 AG UR QL IA.RAPID: NEGATIVE
LACTATE SERPL-SCNC: 1.7 MMOL/L (ref 0.5–2)
LDH FLD L TO P-CCNC: 977 U/L
LDH SERPL L TO P-CCNC: 180 U/L (ref 100–190)
LYMPHOCYTES ABSOLUTE: 1.3 THOU/MM3 (ref 1–4.8)
LYMPHOCYTES NFR BLD AUTO: 4.9 %
MCH RBC QN AUTO: 27.4 PG (ref 26–33)
MCHC RBC AUTO-ENTMCNC: 33.3 GM/DL (ref 32.2–35.5)
MCV RBC AUTO: 82.4 FL (ref 80–94)
MONOCYTES ABSOLUTE: 1.7 THOU/MM3 (ref 0.4–1.3)
MONOCYTES NFR BLD AUTO: 6.7 %
NEUTROPHILS NFR BLD AUTO: 86.7 %
NRBC BLD AUTO-RTO: 0 /100 WBC
PH BIFL: 6.82 [PH]
PLATELET # BLD AUTO: 388 THOU/MM3 (ref 130–400)
PMV BLD AUTO: 9.3 FL (ref 9.4–12.4)
POTASSIUM SERPL-SCNC: 3.8 MEQ/L (ref 3.5–5.2)
PROCALCITONIN SERPL IA-MCNC: 1.36 NG/ML (ref 0.01–0.09)
PROT FLD-MCNC: 5.7 GM/DL
PROT SERPL-MCNC: 6.5 G/DL (ref 6.1–8)
RBC # BLD AUTO: 4.93 MILL/MM3 (ref 4.7–6.1)
SEGMENTED NEUTROPHILS ABSOLUTE COUNT: 22.5 THOU/MM3 (ref 1.8–7.7)
SODIUM SERPL-SCNC: 134 MEQ/L (ref 135–145)
WBC # BLD AUTO: 25.9 THOU/MM3 (ref 4.8–10.8)

## 2023-01-31 PROCEDURE — 87040 BLOOD CULTURE FOR BACTERIA: CPT

## 2023-01-31 PROCEDURE — 2580000003 HC RX 258: Performed by: INTERNAL MEDICINE

## 2023-01-31 PROCEDURE — 87075 CULTR BACTERIA EXCEPT BLOOD: CPT

## 2023-01-31 PROCEDURE — 87070 CULTURE OTHR SPECIMN AEROBIC: CPT

## 2023-01-31 PROCEDURE — 87205 SMEAR GRAM STAIN: CPT

## 2023-01-31 PROCEDURE — 32555 ASPIRATE PLEURA W/ IMAGING: CPT

## 2023-01-31 PROCEDURE — 94669 MECHANICAL CHEST WALL OSCILL: CPT

## 2023-01-31 PROCEDURE — 36415 COLL VENOUS BLD VENIPUNCTURE: CPT

## 2023-01-31 PROCEDURE — 80048 BASIC METABOLIC PNL TOTAL CA: CPT

## 2023-01-31 PROCEDURE — 89050 BODY FLUID CELL COUNT: CPT

## 2023-01-31 PROCEDURE — 6370000000 HC RX 637 (ALT 250 FOR IP): Performed by: INTERNAL MEDICINE

## 2023-01-31 PROCEDURE — 84145 PROCALCITONIN (PCT): CPT

## 2023-01-31 PROCEDURE — 2060000000 HC ICU INTERMEDIATE R&B

## 2023-01-31 PROCEDURE — 97165 OT EVAL LOW COMPLEX 30 MIN: CPT

## 2023-01-31 PROCEDURE — 82945 GLUCOSE OTHER FLUID: CPT

## 2023-01-31 PROCEDURE — 85025 COMPLETE CBC W/AUTO DIFF WBC: CPT

## 2023-01-31 PROCEDURE — 87449 NOS EACH ORGANISM AG IA: CPT

## 2023-01-31 PROCEDURE — 84155 ASSAY OF PROTEIN SERUM: CPT

## 2023-01-31 PROCEDURE — 99255 IP/OBS CONSLTJ NEW/EST HI 80: CPT | Performed by: INTERNAL MEDICINE

## 2023-01-31 PROCEDURE — 97530 THERAPEUTIC ACTIVITIES: CPT

## 2023-01-31 PROCEDURE — 71045 X-RAY EXAM CHEST 1 VIEW: CPT

## 2023-01-31 PROCEDURE — 82042 OTHER SOURCE ALBUMIN QUAN EA: CPT

## 2023-01-31 PROCEDURE — 84157 ASSAY OF PROTEIN OTHER: CPT

## 2023-01-31 PROCEDURE — 83986 ASSAY PH BODY FLUID NOS: CPT

## 2023-01-31 PROCEDURE — 83615 LACTATE (LD) (LDH) ENZYME: CPT

## 2023-01-31 PROCEDURE — 85610 PROTHROMBIN TIME: CPT

## 2023-01-31 PROCEDURE — 88305 TISSUE EXAM BY PATHOLOGIST: CPT

## 2023-01-31 PROCEDURE — 82040 ASSAY OF SERUM ALBUMIN: CPT

## 2023-01-31 PROCEDURE — 88112 CYTOPATH CELL ENHANCE TECH: CPT

## 2023-01-31 PROCEDURE — 83605 ASSAY OF LACTIC ACID: CPT

## 2023-01-31 PROCEDURE — 6360000002 HC RX W HCPCS: Performed by: INTERNAL MEDICINE

## 2023-01-31 PROCEDURE — 88108 CYTOPATH CONCENTRATE TECH: CPT

## 2023-01-31 RX ORDER — ONDANSETRON 4 MG/1
4 TABLET, ORALLY DISINTEGRATING ORAL EVERY 8 HOURS PRN
Status: DISCONTINUED | OUTPATIENT
Start: 2023-01-31 | End: 2023-02-07 | Stop reason: HOSPADM

## 2023-01-31 RX ORDER — ONDANSETRON 2 MG/ML
4 INJECTION INTRAMUSCULAR; INTRAVENOUS EVERY 6 HOURS PRN
Status: DISCONTINUED | OUTPATIENT
Start: 2023-01-31 | End: 2023-02-07 | Stop reason: HOSPADM

## 2023-01-31 RX ORDER — SODIUM CHLORIDE 9 MG/ML
INJECTION, SOLUTION INTRAVENOUS PRN
Status: DISCONTINUED | OUTPATIENT
Start: 2023-01-31 | End: 2023-02-07 | Stop reason: HOSPADM

## 2023-01-31 RX ORDER — ACETAMINOPHEN 650 MG/1
650 SUPPOSITORY RECTAL EVERY 6 HOURS PRN
Status: DISCONTINUED | OUTPATIENT
Start: 2023-01-31 | End: 2023-02-07 | Stop reason: HOSPADM

## 2023-01-31 RX ORDER — IPRATROPIUM BROMIDE AND ALBUTEROL SULFATE 2.5; .5 MG/3ML; MG/3ML
1 SOLUTION RESPIRATORY (INHALATION) EVERY 4 HOURS PRN
Status: DISCONTINUED | OUTPATIENT
Start: 2023-01-31 | End: 2023-02-07 | Stop reason: HOSPADM

## 2023-01-31 RX ORDER — ACETAMINOPHEN 325 MG/1
650 TABLET ORAL EVERY 6 HOURS PRN
Status: DISCONTINUED | OUTPATIENT
Start: 2023-01-31 | End: 2023-02-07 | Stop reason: HOSPADM

## 2023-01-31 RX ORDER — ENOXAPARIN SODIUM 100 MG/ML
30 INJECTION SUBCUTANEOUS EVERY 12 HOURS
Status: DISCONTINUED | OUTPATIENT
Start: 2023-01-31 | End: 2023-02-07 | Stop reason: HOSPADM

## 2023-01-31 RX ORDER — IPRATROPIUM BROMIDE AND ALBUTEROL SULFATE 2.5; .5 MG/3ML; MG/3ML
1 SOLUTION RESPIRATORY (INHALATION)
Status: DISCONTINUED | OUTPATIENT
Start: 2023-01-31 | End: 2023-01-31

## 2023-01-31 RX ORDER — POLYETHYLENE GLYCOL 3350 17 G/17G
17 POWDER, FOR SOLUTION ORAL DAILY PRN
Status: DISCONTINUED | OUTPATIENT
Start: 2023-01-31 | End: 2023-02-07 | Stop reason: HOSPADM

## 2023-01-31 RX ORDER — AZITHROMYCIN 250 MG/1
500 TABLET, FILM COATED ORAL EVERY 24 HOURS
Status: COMPLETED | OUTPATIENT
Start: 2023-01-31 | End: 2023-02-02

## 2023-01-31 RX ORDER — SODIUM CHLORIDE 9 MG/ML
INJECTION, SOLUTION INTRAVENOUS CONTINUOUS
Status: DISCONTINUED | OUTPATIENT
Start: 2023-01-31 | End: 2023-02-02

## 2023-01-31 RX ORDER — SODIUM CHLORIDE 0.9 % (FLUSH) 0.9 %
5-40 SYRINGE (ML) INJECTION EVERY 12 HOURS SCHEDULED
Status: DISCONTINUED | OUTPATIENT
Start: 2023-01-31 | End: 2023-02-07 | Stop reason: HOSPADM

## 2023-01-31 RX ORDER — IBUPROFEN 200 MG
200 TABLET ORAL EVERY 6 HOURS PRN
Status: DISCONTINUED | OUTPATIENT
Start: 2023-01-31 | End: 2023-02-07 | Stop reason: HOSPADM

## 2023-01-31 RX ORDER — SODIUM CHLORIDE 0.9 % (FLUSH) 0.9 %
5-40 SYRINGE (ML) INJECTION PRN
Status: DISCONTINUED | OUTPATIENT
Start: 2023-01-31 | End: 2023-02-07 | Stop reason: HOSPADM

## 2023-01-31 RX ADMIN — SODIUM CHLORIDE, PRESERVATIVE FREE 10 ML: 5 INJECTION INTRAVENOUS at 09:35

## 2023-01-31 RX ADMIN — SODIUM CHLORIDE: 9 INJECTION, SOLUTION INTRAVENOUS at 00:59

## 2023-01-31 RX ADMIN — SODIUM CHLORIDE: 9 INJECTION, SOLUTION INTRAVENOUS at 17:13

## 2023-01-31 RX ADMIN — ACETAMINOPHEN 650 MG: 325 TABLET ORAL at 04:28

## 2023-01-31 RX ADMIN — CEFTRIAXONE SODIUM 1000 MG: 1 INJECTION, POWDER, FOR SOLUTION INTRAMUSCULAR; INTRAVENOUS at 03:09

## 2023-01-31 RX ADMIN — ACETAMINOPHEN 650 MG: 325 TABLET ORAL at 16:05

## 2023-01-31 RX ADMIN — AZITHROMYCIN MONOHYDRATE 500 MG: 250 TABLET ORAL at 00:59

## 2023-01-31 RX ADMIN — IBUPROFEN 200 MG: 200 TABLET, FILM COATED ORAL at 15:39

## 2023-01-31 RX ADMIN — ACETAMINOPHEN 650 MG: 325 TABLET ORAL at 22:55

## 2023-01-31 RX ADMIN — ENOXAPARIN SODIUM 30 MG: 100 INJECTION SUBCUTANEOUS at 01:00

## 2023-01-31 ASSESSMENT — PAIN SCALES - GENERAL
PAINLEVEL_OUTOF10: 2
PAINLEVEL_OUTOF10: 5
PAINLEVEL_OUTOF10: 5
PAINLEVEL_OUTOF10: 3
PAINLEVEL_OUTOF10: 7
PAINLEVEL_OUTOF10: 8
PAINLEVEL_OUTOF10: 0
PAINLEVEL_OUTOF10: 7

## 2023-01-31 ASSESSMENT — ENCOUNTER SYMPTOMS
COUGH: 1
SINUS PRESSURE: 0
COUGH: 1
WHEEZING: 0
ABDOMINAL PAIN: 0
DIARRHEA: 0
ABDOMINAL PAIN: 0
ABDOMINAL DISTENTION: 0
BACK PAIN: 0
SHORTNESS OF BREATH: 1
STRIDOR: 0
WHEEZING: 0
NAUSEA: 0
COLOR CHANGE: 0
SORE THROAT: 0
SHORTNESS OF BREATH: 1
STRIDOR: 0
VOMITING: 0
CHEST TIGHTNESS: 0
CONSTIPATION: 0
VOMITING: 0
DIARRHEA: 0

## 2023-01-31 ASSESSMENT — PAIN DESCRIPTION - PAIN TYPE: TYPE: ACUTE PAIN

## 2023-01-31 ASSESSMENT — PAIN DESCRIPTION - ORIENTATION
ORIENTATION: RIGHT

## 2023-01-31 ASSESSMENT — PAIN DESCRIPTION - LOCATION
LOCATION: SHOULDER

## 2023-01-31 ASSESSMENT — PAIN DESCRIPTION - DESCRIPTORS
DESCRIPTORS: ACHING
DESCRIPTORS: ACHING

## 2023-01-31 ASSESSMENT — PAIN DESCRIPTION - FREQUENCY: FREQUENCY: CONTINUOUS

## 2023-01-31 ASSESSMENT — PAIN DESCRIPTION - ONSET: ONSET: GRADUAL

## 2023-01-31 NOTE — CONSULTS
CONSULTATION NOTE :ID       Patient - Kristy Palomino,  Age - 40 y.o.    - 1980      Room Number - 4K-04/004-A   MRN -  [id number]   Acct # - [de-identified]  Date of Admission -  2023  5:45 PM  Patient's PCP: Khushboo Arciniega MD     Requesting Physician: Tyra Baum MD    REASON FOR CONSULTATION   Pneumonia and pleural effusion - assist with treatment  CHIEF COMPLAINT     Cough and shortness of breath for three days duration     HISTORY OF PRESENT ILLNESS       This is a very pleasant 40 y.o. male who was admitted to the hospital with a chief complaints of cough and shortness of breath for three days duration. He says he recently had a pleural effusion in December with pneumonia that was treated, had temporary improvement, then he began to feel right sided pain once again this weekend along with above symptoms and had a fever of 102 F  night. He admitted to somewhat productive cough but has not been able to expel sputum. Admits to right sided chest and shoulder pain. Denies nausea, vomiting, hemoptysis, lower extremity pitting edema, and UTI symptoms. Denies recent travel and sick contacts. Is not up to date with Influenza and COVID vaccines. CXR from OSH one day ago significant for very large right sided pleural effusion and patient presented to the ED at [facility] and was admitted. He was febrile upon arrival with leukocytosis 24.1 and tachycardia. Procal elevated 1.41, lactate wnl. He was given Rocephin and Levaquin upon arrival in ED. Required 2L O2 in ED. Now on 6L O2 NC. No O2 at baseline. Continues to be tachycardic, now afebrile and patient admits to slight improvement since arrival. Has had three episodes of watery diarrhea since admission.     PAST MEDICAL  HISTORY       Past Medical History:   Diagnosis Date    Azoospermia     Hypogonadism       - obesity    PAST SURGICAL HISTORY     Past Surgical History:   Procedure Laterality Date    CHOLECYSTECTOMY stent placement as well. MEDICATIONS:       Scheduled Meds:   testosterone  2 patch Topical Q24H    sodium chloride flush  5-40 mL IntraVENous 2 times per day    [Held by provider] enoxaparin  30 mg SubCUTAneous Q12H    cefTRIAXone (ROCEPHIN) IV  1,000 mg IntraVENous Q24H    And    azithromycin  500 mg Oral Q24H     Continuous Infusions:   sodium chloride      sodium chloride 75 mL/hr at 01/31/23 1054     PRN Meds:ibuprofen, sodium chloride flush, sodium chloride, ondansetron **OR** ondansetron, polyethylene glycol, acetaminophen **OR** acetaminophen, ipratropium-albuterol  Allergies:   ALLERGIES:    Patient has no known allergies. SOCIAL HISTORY:     TOBACCO:   reports that he has never smoked. He has never used smokeless tobacco.     ETOH:   reports no history of alcohol use. Patient currently lives with family wife and six kids. FAMILY HISTORY:         Problem Relation Age of Onset    Cancer Mother         hodgkins    No Known Problems Father        REVIEW OF SYSTEMS:     Constitutional: Had fever and fatigue, no night sweats, no weight loss. Head: no headache , no head injury, no migranes. Eye: no eye discharge, blurring of vision, no double vision, no eye pain. Ears: no hearing difficulty, no tinnitus  Mouth/throat: no ulceration, dental caries, dysphagia, no hoarseness and voice change  Respiratory: Had cough, right sided chest pain, shortness of breath  CVS: no palpitations  GI: Had diarrhea. No abdominal pain, no nausea , no vomiting, no constipation. SARAH: no dysuria, frequency and urgency, no hematuria  Musculoskeletal: no joint pain, swelling, stiffness  Endocrine: no polyuria, polydipsia, no cold or heat intolerance  Hematology: no anemia, no easy brusing or bleeding, no hx of clotting disorder  Dermatology: no skin rash, no skin lesions, no pruritis,  Neurological: no headaches,no dizziness, no seizure, no numbness.   Psychiatry: no depression, no anxiety,no panic attacks, no suicide ideation    PHYSICAL EXAM:     BP (!) 144/83   Pulse (!) 125   Temp 98.3 °F (36.8 °C) (Oral)   Resp 26   Ht 5' 10\" (1.778 m)   Wt 297 lb (134.7 kg)   SpO2 94%   BMI 42.62 kg/m²   General apperance:  Awake, alert, not in distress. HEENT: pink conjunctiva, unicteric sclera, moist oral mucosa. Chest:  Diminished breath sounds of RLL. Tactile fremitus decreased on the right lower lung fields   No wheezes, rales, rhonchi. Cardiovascular:  Tachycardic, regular rhythm, S1S2, no murmur or gallop. Abdomen:  Soft, non tender to palpation. Extremities: No AILYN  Skin:  Warm and dry.   CNS: AxO x3        LABS:     CBC:   Recent Labs     01/30/23  1602 01/30/23  1800 01/31/23  0453   WBC 21.3* 24.1* 25.9*   HGB 13.9* 14.0 13.5*    394 388     BMP:    Recent Labs     01/30/23  1602 01/30/23  1800 01/31/23  0453    136 134*   K 3.7 3.7 3.8    100 100   CO2 26 23 22*   BUN 16 15 13   CREATININE 1.1 0.8 0.7   GLUCOSE 112* 111* 118*     Calcium:  Recent Labs     01/31/23  0453   CALCIUM 8.6      Recent Labs     01/31/23  0900   INR 1.42*     Hepatic:   Recent Labs     01/30/23  1800 01/31/23  0900   ALKPHOS 77  --    ALT 20  --    AST 12  --    PROT 7.9 6.5   BILITOT 1.5*  --    BILIDIR 0.5*  --    LABALBU 4.1 3.8     Amylase and Lipase:  Recent Labs     01/31/23  0453   LACTA 1.7     Lactic Acid:   Recent Labs     01/31/23  0453   LACTA 1.7         Micro:   Lab Results   Component Value Date/Time    BC No growth-preliminary  No growth   01/04/2019 12:49 PM       Problem list of patient      Patient Active Problem List   Diagnosis Code    [facility]ogonad[facility]ogonadism syndrome, [facility] ([facility] E23.0    Hypogonadism in male E29.1    Mixed hyperlipidemia E78.2    Pneumonia due to organism J18.9    Acute pneumonia J18.9           Impression and Recommendation:   Acute hypoxic respiratory failure  CAP with significant right sided pleural effusion  CAP severity index for adults PSI 91, Class IV 9.3% mortality  Negative COVID and flu  Continue azithromycin and rocephin  Pulm consulted for thoracentesis. Will get culture and cytology with fluid. Will repeat CXR. Continue O2 NC and wean to RA as able  Blood cultures, respiratory cultures, urine antigens pending  Not interested in vaccinations         Infection Control:   Standard precautions  Discharge Planning (Coordination of out patient care: To be determined. Plan is for home  Thank you Don Brice MD for allowing me to participate in this patient's care.     Mina Clifford MD, MD,FACP 1/31/2023 10:55 AM

## 2023-01-31 NOTE — ED NOTES
[facility] Patient resting in bed. No distress noted. Call light within reach.       Angela Newton RN  01/30/23 5944

## 2023-01-31 NOTE — CARE COORDINATION
01/31/23 1148   Service Assessment   Patient Orientation Alert and Oriented   Cognition Alert   History Provided By Patient;Medical Record   Primary Caregiver Self   Accompanied By/Relationship none   Support Systems Spouse/Significant Other   Patient's Healthcare Decision Maker is: Legal Next of Kin   PCP Verified by CM Yes   Last Visit to PCP Within last 3 months   Prior Functional Level Independent in ADLs/IADLs   Current Functional Level Independent in ADLs/IADLs   Can patient return to prior living arrangement Yes   Ability to make needs known: Good   Family able to assist with home care needs: Yes   Would you like for me to discuss the discharge plan with any other family members/significant others, and if so, who? No   Financial Resources Maria Sou None   CM/SW Referral ADLs/IADLs   Discharge Planning   Type of Residence House   Living Arrangements Spouse/Significant Other   Current Services Prior To Admission None   Potential Assistance Needed N/A   DME Ordered? No   Potential Assistance Purchasing Medications No   Type of Home Care Services None   Patient expects to be discharged to: House   Follow Up Appointment: Best Day/Time    (AM)   Services At/After Discharge   Transition of Care Consult (CM Consult) [address] Discharge None   [employer] Information Provided? No   Confirm Follow Up Transport Family   Condition of Participation: Discharge Planning   The Plan for Transition of Care is related to the following treatment goals: PNA treatment   Freedom of Choice list was provided with basic dialogue that supports the patient's individualized plan of care/goals, treatment preferences, and shares the quality data associated with the providers?   No

## 2023-01-31 NOTE — PROGRESS NOTES
Liliane Li 61  INPATIENT OCCUPATIONAL THERAPY  STR ICU STEPDOWN TELEMETRY 4K  EVALUATION    Time:   Time In: 818  Time Out: 7152  Timed Code Treatment Minutes: 12 Minutes  Minutes: 20          Date: 2023  Patient Name: Marry Gregorio,   Gender: male      MRN: [id number]  : 1980  (40 y.o.)  Referring Practitioner: Tu Valencia MD  Diagnosis: Acute Pneumonia  Additional Pertinent Hx: The patient is a 40 y.o. male  who presents with SOB. Pt states he was treated in December for possible pneumonia. He took antibiotics and felt better for a few weeks. In mid January he went to ED for cough SOB and fever and workup was negative. He returned to his PCP yesterday for same symptoms, cough, fever, congestion, and was given IM Rocephin and Levaquin, upon review of CXR he was told to go to ED for further workup. In the emergency department he was saturating 89 to 90% on room air and was placed on 2 L supplemental oxygen. CXR-   Large right pleural effusion and suspected underlying airspace infiltrates. \"    Restrictions/Precautions:  Restrictions/Precautions: General Precautions    Subjective  Chart Reviewed: Yes, Orders, Progress Notes, History and Physical  Patient assessed for rehabilitation services?: Yes    Subjective: RN okayed OT session. Upon arrival patient was resting in bed. Pt was agreeable to OT session. Pain: 0/10: Pt denies     Vitals: Oxygen: Pt on 5Liters O2 throughout session. Pt maintained o2 sats above 90% throughout. Pt demonstrates increased labored breathing with activity.      Social/Functional History:  Lives With: Family  Type of Home: House  Home Layout: Two level  Home Access: Level entry   Bathroom Shower/Tub: Walk-in shower  Bathroom Toilet: Standard  Bathroom Accessibility: Accessible     ADL Assistance: Independent  Homemaking Assistance: Independent  Homemaking Responsibilities: Yes  Ambulation Assistance: Independent  Transfer Assistance: Independent    Active : Yes  Mode of Transportation: Car  Occupation: Full time employment  Type of Occupation: [employer]       VISION:WFL    HEARING:  WFL    COGNITION: WFL    RANGE OF MOTION:  Bilateral Upper Extremity:  WFL Pt report increased pain in Right shoulder with flexion. STRENGTH:  Bilateral Upper Extremity:  WFL    SENSATION:   WFL    ADL:   Feeding: Independent. Footwear Management: Stand By Assistance. Sav Leos BALANCE:  Sitting Balance:  Supervision. Standing Balance: Stand By Assistance. BED MOBILITY:  Supine to Sit: Stand By Assistance    Scooting: Stand By Assistance      TRANSFERS:  Sit to Stand:  Stand By Assistance. Stand to Sit: Stand By Assistance. FUNCTIONAL MOBILITY:  Assistive Device: None  Assist Level:  Stand By Assistance. Distance:  EOB to recliner. Slow pace, No LOB, increased SOB. Activity Tolerance:  Patient tolerance of  treatment: Fair. Limited by SOB with all activity. Assessment: This 40year old male presents with acute. Pt demonstrates decreased endurance and increased SOB with all activity. Pt requires skilled OT intervention to increase indep and endurance with all self cares, transfers, mobility, and IADLs and educated on EC techniques to return to PLOF. Without skilled OT intervention patient is at increased risk for falls, caregiver burden, and hospital readmission after discharge. Performance deficits / Impairments: Decreased endurance, Decreased ADL status, Decreased high-level IADLs  Prognosis: Good  REQUIRES OT FOLLOW-UP: Yes    Treatment Initiated: Treatment and education initiated within context of evaluation. Evaluation time included review of current medical information, gathering information related to past medical, social and functional history, completion of standardized testing, formal and informal observation of tasks, assessment of data and development of plan of care and goals. Treatment time included skilled education and facilitation of tasks to increase safety and independence with ADL's for improved functional independence and quality of life. Discharge Recommendations:  Continue to assess pending progress, Home with assist PRN, Home independently    Patient Education:     Patient Education  Education Given To: Patient  Education Provided: Role of Therapy, Plan of Care, Precautions, ADL Adaptive Strategies, Transfer Training  Education Method: Demonstration, Verbal  Barriers to Learning: None  Education Outcome: Verbalized understanding, Continued education needed    Equipment Recommendations:  Equipment Needed: No    Plan:  Times Per Week: 2-3x  Times Per Day: Once a day  Current Treatment Recommendations: Balance training, Strengthening, Functional mobility training, Endurance training, Equipment evaluation, education, & procurement, Self-Care / ADL, Safety education & training, Patient/Caregiver education & training, Home management training. See long-term goal time frame for expected duration of plan of care. If no long-term goals established, a short length of stay is anticipated. Goals:  Patient goals : Go Home  Short Term Goals  Time Frame for Short Term Goals: Until discharge  Short Term Goal 1: Pt will complete BADL routine with EC techniques Indep to increase endurance in home environment. Short Term Goal 2: Pt will complete mobility to/from BR and HH distances with Indep while O2 sats remain above 90% to increase indep and endurance with all toileting. Additional Goals?: No         Following session, patient left in safe position with all fall risk precautions in place.

## 2023-01-31 NOTE — ED NOTES
Patient stood at side of bed at this time to change pants. Patient experienced tachypnea and labored breathing. Patient assisted back into bed. Breathing then improved slightly. Patient remains on 2L oxygen.      Calin Pimentel RN  01/31/23 0146

## 2023-01-31 NOTE — RT PROTOCOL NOTE
RT Inhaler-Nebulizer Bronchodilator Protocol Note    There is a bronchodilator order in the chart from a provider indicating to follow the RT Bronchodilator Protocol and there is an Initiate RT Inhaler-Nebulizer Bronchodilator Protocol order as well (see protocol at bottom of note). CXR Findings:  No results found. The findings from the last RT Protocol Assessment were as follows:   History Pulmonary Disease: None or smoker <15 pack years  Respiratory Pattern: Regular pattern and RR 12-20 bpm  Breath Sounds: Clear breath sounds  Cough: Strong, spontaneous, non-productive  Indication for Bronchodilator Therapy: None  Bronchodilator Assessment Score: 0    Aerosolized bronchodilator medication orders have been revised according to the RT Inhaler-Nebulizer Bronchodilator Protocol below. Respiratory Therapist to perform RT Therapy Protocol Assessment initially then follow the protocol. Repeat RT Therapy Protocol Assessment PRN for score 0-3 or on second treatment, BID, and PRN for scores above 3. No Indications - adjust the frequency to every 6 hours PRN wheezing or bronchospasm, if no treatments needed after 48 hours then discontinue using Per Protocol order mode. If indication present, adjust the RT bronchodilator orders based on the Bronchodilator Assessment Score as indicated below. Use Inhaler orders unless patient has one or more of the following: on home nebulizer, not able to hold breath for 10 seconds, is not alert and oriented, cannot activate and use MDI correctly, or respiratory rate 25 breaths per minute or more, then use the equivalent nebulizer order(s) with same Frequency and PRN reasons based on the score. If a patient is on this medication at home then do not decrease Frequency below that used at home.     0-3 - enter or revise RT bronchodilator order(s) to equivalent RT Bronchodilator order with Frequency of every 4 hours PRN for wheezing or increased work of breathing using Per Protocol order mode. 4-6 - enter or revise RT Bronchodilator order(s) to two equivalent RT bronchodilator orders with one order with BID Frequency and one order with Frequency of every 4 hours PRN wheezing or increased work of breathing using Per Protocol order mode. 7-10 - enter or revise RT Bronchodilator order(s) to two equivalent RT bronchodilator orders with one order with TID Frequency and one order with Frequency of every 4 hours PRN wheezing or increased work of breathing using Per Protocol order mode. 11-13 - enter or revise RT Bronchodilator order(s) to one equivalent RT bronchodilator order with QID Frequency and an Albuterol order with Frequency of every 4 hours PRN wheezing or increased work of breathing using Per Protocol order mode. Greater than 13 - enter or revise RT Bronchodilator order(s) to one equivalent RT bronchodilator order with every 4 hours Frequency and an Albuterol order with Frequency of every 2 hours PRN wheezing or increased work of breathing using Per Protocol order mode. RT to enter RT Home Evaluation for COPD & MDI Assessment order using Per Protocol order mode.     Electronically signed by Angelita Rodas RCP on 1/31/2023 at 7:26 AM

## 2023-01-31 NOTE — ED NOTES
Pt transported to [address] [place] [address] nurse informed prior to taking pt up in a stable condition.       Aspen Costello  01/31/23 4460

## 2023-01-31 NOTE — H&P
Patient seen and examined by me  Discussed with Denis Robert and full note to follow  Patient sitting up in chair. On 6 L of oxygen. Appears tachypneic and unable to finish a sentence without taking a pause. Also noted patient to be tachycardic. Is a 21-year-old male with past medical history of hypogonadism on testosterone replacement has symptoms of cough shortness of breath and right shoulder pain for which she presented to the urgent care and was prescribed antibiotics for possible pneumonia. Patient felt better only to have the symptoms return in a few weeks. He again went to urgent care at which time he was told that he probably has viral symptoms. But for the last 3 days patient has noticed increasing shortness of breath fever and occasional cough. Work-up in the ER he was noted to have a large right pleural effusion with underlying airspace infiltrates. Patient still continues to be tachycardic heart rate in the 120s. Diminished air exchange on the right side noted. But no rhonchi rales appreciated. Pulmonary is already been consulted and is scheduled for thoracentesis. In view of his recurrent pneumonia with no significant increased risk factors for chronic medical conditions we will consult infectious disease. Urine for Legionella has already been sent. DVT prophylaxis and bronchodilators to continue. Lactic acid is normal range.   We will continue treatment for pneumonia with sepsis    Electronically signed by Dillon Joiner MD on 1/31/2023 at 10:38 AM

## 2023-01-31 NOTE — PROGRESS NOTES
Liliane Li 61  PHYSICAL THERAPY MISSED TREATMENT NOTE  STRZ ICU STEPDOWN TELEMETRY 4K    Date: 2023  Patient Name: Kel Jean        MRN: [id number]   : 1980  (40 y.o.)  Gender: male                REASON FOR MISSED TREATMENT:  Missed Treat. Pt's HR in the 130's, will hold.

## 2023-01-31 NOTE — ED NOTES
Patient resting in bed. No distress noted. Call light within reach. Wife at the bedside.       Giovanna Swann RN  01/30/23 4644

## 2023-01-31 NOTE — PROGRESS NOTES
Admission complete. Patient very short of breath with increased respirations while questioning. Will let bedside nurse know via perfectserve. Call light is within patient's reach. Bed in lowest position. Bedrails up X2. Adequate lighting and floor uncluttered.

## 2023-01-31 NOTE — CARE COORDINATION
Case Management Assessment  Initial Evaluation    Date/Time of Evaluation: 1/31/2023 1:48 PM  Assessment Completed by: Keshawn Chase RN    If patient is discharged prior to next notation, then this note serves as note for discharge by case management. Patient Name: Gunjan Freitas                   YOB: 1980  Diagnosis: Pleural effusion, right [J90]  Pneumonia of right lower lobe due to infectious organism [J18.9]  Acute pneumonia [J18.9]                   Date / Time: 1/30/2023  5:45 PM  Location: Replaced by [id number]     Patient Admission Status: Inpatient   Readmission Risk (Low < 19, Mod (19-27), High > 27): Readmission Risk Score: 4.3    Current PCP: George Knutson MD  PCP verified by CM? Yes    Chart Reviewed: Yes      History Provided by: Patient, Medical Record  Patient Orientation: Alert and Oriented    Patient Cognition: Alert    Hospitalization in the last 30 days (Readmission):  No    If yes, Readmission Assessment in CM Navigator will be completed. Advance Directives:      Code Status: Full Code   Patient's Primary Decision Maker is: Legal Next of Kin      Discharge Planning:    Patient lives with: Spouse/Significant Other Type of Home: House  Primary Care Giver: Self  Patient Support Systems include: Spouse/Significant Other   Current Financial resources: Medicaid  Current community resources: None  Current services prior to admission: None            Current DME:              Type of Home Care services:  None    ADLS  Prior functional level: Independent in ADLs/IADLs  Current functional level: Independent in ADLs/IADLs    Family can provide assistance at DC: Yes  Would you like Case Management to discuss the discharge plan with any other family members/significant others, and if so, who?  No  Plans to Return to Present Housing: Yes  Other Identified Issues/Barriers to RETURNING to current housing: yes  Potential Assistance needed at discharge: N/A            Potential DME:    Patient expects to discharge [facility]: [facility] for transportation at discharge: Family    Financial    Payor: [address] / Plan: [address] - / Product Type: *No Product type* /     Does insurance require precert for SNF: Yes    Potential assistance Purchasing Medications: No  Meds-to-Beds request: Yes      [facility] - P [phone] - F [phone]  [address] [address]  Phone: [phone] Fax: [phone] Sir Trell Farfan [facility] [phone] - f [phone]  [address] [place] [address],[address] [facility]  Phone: [phone] Fax: [phone]      Notes:    Factors facilitating achievement of predicted outcomes: Family support    Barriers to discharge: none    Additional Case Management Notes:   PNA/Right Pleural Effusion/Empyema    1/31 Right Thoracentesis = 1.8L removed    2/1 Right Chest Tube    2/2 Intrapleural TPA  2/3 Intrapleural TPA  2/4 Intrapleural TPA   2/5 Intrapleural TPA  2/6 Intrapleural TPA    IV AB, IVF, Oxygen 2L    Elevated WBC/Tachycardia last 24h      The Plan for Transition of Care is related to the following treatment goals of Pleural effusion, right [J90]  Pneumonia of right lower lobe due to infectious organism [J18.9]  Acute pneumonia [J18.9]    Patient Goals/Plan/Treatment Preferences: lives w spouse Tatiana Valdez independently as PTA when medically cleared; therapy following  Transportation/Food Security/Housekeeping Addressed: No issues identified.      Leno Shah RN  Case Management Department

## 2023-01-31 NOTE — CONSULTS
[facility]      Patient - Viv Wilkins   MRN -  [id number]   [facility] # - [de-identified]   - 1980      Date of Admission -  2023  5:45 PM  Date of evaluation -  2023  Room - -004-A   Hospital Day - 1  Consulting - Mahi Cardenas MD Primary Care Physician - Samantha Ramos MD       Problem List      Active Hospital Problems    Diagnosis Date Noted    Acute pneumonia [J18.9] 2023     Priority: Medium     Reason for Consult    Large right pleural effusion  HPI   History Obtained From: Patient, nursing staff, and electronic medical record. Viv Wilkins is a 40 y.o. 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 Per patient, patient started feeling sick on  with concurrent shortness of breath along with fevers at home. Also had right sided constant chest pain that was non exertional and constant. The next day, he had a dry cough. He was seen at outpatient primary clinic for the symptoms on . Chest x-ray at the time showed a right-sided large effusion. He was given one-time dose of IM Rocephin and 7 days of Levaquin. However, when chest x-ray results came back, he was sent to the ED due to concerns for a parapneumonic effusion. Of note, patient was diagnosed with and treated as outpatient in mid December at [facility] for a right-sided middle lobe pneumonia diagnosed on CTA chest.  He also reports a history of a right-sided pneumonia in 2019 that required inpatient stay for antibiotics. Otherwise, patient is not on O2 at home, has no smoking history, or history of aspiration. He was tachypneic to 40s on presentation. SP02 90% on RA.  Tachy to 130s. BP wnl. Had labored breathing and was placed on o2. WBC 24.1. Procal 1.41. Bmp, Lactate nml. Bedside u/s ED shows possible loculations. Got 1 L NS bolus and was admitted to step down. Covid 19/influenza negative. Currently, on 6 L of o2 satting 94%. Afebrile. On azithromycin and ctx. 2x blood cultures pending. Expectorated sputum culture/gram stain, urine legionella and strep pneumo antigen pending. Received 1x 30 mg subq lovenox at 1am on 1/31. He is having shortness of breath: Yes  Onset: gradual, worsening   Duration:4 days. Diurnal variation:  None. Functional status prior to beginning of symptoms: 8-10 block/s on level ground. Current functional capacity on level ground: 0 block/s on level ground. He can climb steps: Yes, before. Not currently. Flights of steps he can climb: 5  He denies orthopnea. He denies paroxysmal nocturnal dyspnea. He is having cough: Yes  Duration of cough: for 3 days. His cough is associated with sputum production: No.  Hemoptysis:No  Diurnal variation: None. Relieving factors: No  Aggravating factors: No      He is having chest pain:Yes  Duration:Days:4  Onset:gradual, unchanged. Location:predominantly on right chest  Nature/Quality:dull  Assessment:constant, worsened by laying down and improved with sitting up. Radiation:radiating to right shoulder  Scale:5/10  Relation ship to breathing: not significantly changed with inspiration. Sleep medicine HPI/Evaluation:    Usual time to go to bed during the work/regular day of week: 10-11pm  Usual time to wake up during the work//regular day of week: 7-8am    Sleep apnea symptoms:  Noticed to have loud snoring:Yes.  Noted by his family member- spouse  Witnessed apneas during sleep noticed: No.   History of choking and gasping sensation at night time: No.   History of headaches in the morning:No.   History of dry mouth in the morning: No.   History of palpitations during night time/nocturnal awakenings: No.   History of sweating during night time/nocturnal awakenings: No.      General:  History of head injury in the past: No.   History of seizures: No  Rest less legs syndrome symptoms:NO  History suggestive of periodic limb movements during sleep: NO  History suggestive of hypnagogic hallucinations: NO  History suggestive of hypnopompic hallucinations: NO  History suggestive of sleep talking:NO  History suggestive of sleep walking:NO  History suggestive of bruxism: NO    History suggestive of cataplexy: NO  History suggestive of sleep paralysis: NO    History regarding old sleep studies:  Prior history of sleep study: No.  Using CPAP device: No.  Currently using home Oxygen: NO.       Patient considerations:  Is the patient is ambulatory: Yes  Patient is currently using: None of these Wheelchair, Walker or [employer]. Para/Quadriplegic: NO  Hearing deficit : NO  Claustrophobic: NO  MDD : NO  Blind: NO  Incontinent: NO  Para/Quadraplegi: NO.   Need transportation to and from Sleep Center:NO    [place] Sleepiness Score:   Sitting and readin  Watching TV:0  Sitting inactive in a public place:1  Being a passenger in a motor vehicle for an hour or more:3  Lying down in the afternoon:3  Sitting and talking to someone:2  Sitting quietly after lunch (no alcohol): 2  Stopped for a few minutes in traffic while drivin  Total Score: 12    Mallampati Score: 4   Neck Circumference: 25.0 Inches      PMHx   Past Medical History      Diagnosis Date    Azoospermia     Hypogonadism       Past Surgical History        Procedure Laterality Date    CHOLECYSTECTOMY      stent placement as well.      Meds    Current Medications    testosterone  2 patch Topical Q24H    sodium chloride flush  5-40 mL IntraVENous 2 times per day    [Held by provider] enoxaparin  30 mg SubCUTAneous Q12H    cefTRIAXone (ROCEPHIN) IV  1,000 mg IntraVENous Q24H    And    azithromycin  500 mg Oral Q24H     ibuprofen, sodium chloride flush, sodium chloride, ondansetron **OR** ondansetron, polyethylene glycol, acetaminophen **OR** acetaminophen, ipratropium-albuterol  IV Drips/Infusions   sodium chloride      sodium chloride 75 mL/hr at 01/31/23 0059     Home Medications  Medications Prior to Admission: levoFLOXacin (LEVAQUIN) 750 MG tablet, Take 1 tablet by mouth daily for 7 days (Patient not taking: Reported on 1/31/2023)  ANDRODERM 4 MG/24HR PT24, Place 8 mg onto the skin daily for 30 days. Two patches daily. Fexofenadine HCl (ALLEGRA PO), Take by mouth daily (Patient not taking: Reported on 1/30/2023)  ibuprofen (ADVIL;MOTRIN) 200 MG tablet, Take 200 mg by mouth every 6 hours as needed for Pain (Patient not taking: Reported on 1/30/2023)  Diet    ADULT DIET; Regular  Allergies    Patient has no known allergies. Social History     Social History     Socioeconomic History    Marital status:      Spouse name: Gaudencio Garcia    Number of children: 6    Years of education: Not on file    Highest education level: Not on file   Occupational History    Not on file   Tobacco Use    Smoking status: Never    Smokeless tobacco: Never   Vaping Use    Vaping Use: Never used   Substance and Sexual Activity    Alcohol use: No    Drug use: No    Sexual activity: Not on file   Other Topics Concern    Not on file   Social History Narrative    Not on file     Social Determinants of Health     Financial Resource Strain: Low Risk     Difficulty of Paying Living Expenses: Not hard at all   Food Insecurity: No Food Insecurity    Worried About Running Out of Food in the Last Year: Never true    [address] in the Last Year: Never true   Transportation Needs: No Transportation Needs    Lack of Transportation (Medical): No    Lack of Transportation (Non-Medical):  No   Physical Activity: Not on file   Stress: Not on file   Social Connections: Not on file   Intimate Partner Violence: Not on file   Housing Stability: Not on file     Family History          Problem Relation Age of Onset Cancer Mother         hodgkins    No Known Problems Father      Sleep History    Never diagnosed with sleep apnea in the past.  Occupational history   Occupation:  He is current working: Yes  Type of profession: full time job doing working at [employer]. History of tobacco smoking:No  .  History of recreational or IV drug use in the past:NO     History of exposure to coal mines/coal dust: NO  History of exposure to foundry dust/welding: NO  History of exposure to quarry/silica/sandblasting: NO  History of exposure to asbestos/working with breaks/ships: NO  History of exposure to farm dust: NO  History of recent travel to long distances: NO  History of exposure to birds, pigeons, or chickens in the past:NO  Pet animals at home:Yes, 1. History of pulmonary embolism in the past: No            History of DVT in the past:No       Review of systems   Review of Systems   Constitutional:  Positive for chills and fever. HENT:  Positive for congestion. Respiratory:  Positive for cough and shortness of breath. Negative for wheezing and stridor. Cardiovascular:  Positive for chest pain. Negative for palpitations and leg swelling. Right sided, nonexertional   Gastrointestinal:  Negative for abdominal pain, diarrhea and vomiting. Genitourinary: Negative. Musculoskeletal: Negative. Neurological:  Negative for dizziness. Vitals     height is 5' 10\" (1.778 m) and weight is 297 lb (134.7 kg). His oral temperature is 98.3 °F (36.8 °C). His blood pressure is 144/83 (abnormal) and his pulse is 125 (abnormal). His respiration is 26 and oxygen saturation is 92%. Body mass index is 42.62 kg/m². SUPPLEMENTAL O2: O2 Flow Rate (L/min): 2 L/min     I/O      Intake/Output Summary (Last 24 hours) at 1/31/2023 0915  Last data filed at 1/31/2023 0905  Gross per 24 hour   Intake 240 ml   Output --   Net 240 ml     No intake/output data recorded.    Patient Vitals for the past 96 hrs (Last 3 readings):   Weight   01/30/23 1747 297 lb (134.7 kg)       Exam   Nursing note and vitals reviewed. Physical Exam:  General appearance: alert, appears short of breath, on 6L o2. HEENT:  Normal cephalic, atraumatic without obvious deformity. Conjunctivae clear. Neck: Supple, with full range of motion. Respiratory:  dullness to percussion on right lower lung field w/ absent breath sounds. Cardiovascular: Normal rate, regular rhythm with normal S1/S2 without murmurs, rubs or gallops. Abdomen: Soft, non-tender, non-distended with normal bowel sounds. Musculoskeletal:  No clubbing, cyanosis or edema bilaterally. Full range of motion without deformity. Skin: No rashes or lesions. Neurological exam reveals alert, oriented, normal speech, no focal findings or movement disorder noted. Exam of extremities: no pedal or leg edema, no cyanosis     Labs  - Old records and notes have been reviewed in [facility]   ABG  No results found for: PH, PO2, PCO2, HCO3, O2SAT  No results found for: IFIO2, MODE, SETTIDVOL, SETPEEP  CBC  Recent Labs     01/30/23  1602 01/30/23  1800 01/31/23  0453   WBC 21.3* 24.1* 25.9*   RBC 5.04 5.15 4.93   HGB 13.9* 14.0 13.5*   HCT 40.9* 42.1 40.6*   MCV 81.2 81.7 82.4   MCH 27.6 27.2 27.4   MCHC 34.0 33.3 33.3   RDW 12.7  --   --     394 388   MPV 8.9* 9.1* 9.3*      BMP  Recent Labs     01/30/23  1602 01/30/23  1800 01/31/23  0453    136 134*   K 3.7 3.7 3.8    100 100   CO2 26 23 22*   BUN 16 15 13   CREATININE 1.1 0.8 0.7   GLUCOSE 112* 111* 118*   CALCIUM  --  9.4 8.6     LFT  Recent Labs     01/30/23  1800   AST 12   ALT 20   BILITOT 1.5*   ALKPHOS 77   LIPASE 19.5     TROP  Lab Results   Component Value Date/Time    TROPONINT < 0.010 01/30/2023 06:00 PM     BNP  No results for input(s): BNP in the last 72 hours. Lactic Acid  Recent Labs     01/31/23  0453   LACTA 1.7     INR  No results for input(s): INR, PROTIME in the last 72 hours.   PTT  No results for input(s): APTT in the last 72 hours. Glucose  No results for input(s): POCGLU in the last 72 hours. UA No results for input(s): SPECGRAV, PHUR, COLORU, CLARITYU, MUCUS, PROTEINU, BLOODU, RBCUA, WBCUA, BACTERIA, NITRU, GLUCOSEU, BILIRUBINUR, UROBILINOGEN, KETUA, LABCAST, LABCASTTY, AMORPHOS in the last 72 hours. Invalid input(s): CRYSTALS. PFTs   None    Sleep studies   None    Cultures    2x blood cultures obtained and pending (Obtained 1/30/23)  Expectorated sputum culture/gram stain, urine legionella and strep pneumo antigen pending. Thoracentesis fluid analysis pending. EKG     Echocardiogram   None this admission. Radiology    CXR    1/31/23 Post Thoracentesis  Impression   No pneumothorax following a right-sided thoracentesis. 1/30/23  Impression   Large right pleural effusion and suspected underlying airspace infiltrates. CT Scans  None    Assessment   Pneumonia w/ parapneumonic effusion  Acute hypoxic respiratory failure  Concern for underlying [place]    Plan   Thoracentesis by IR w/ fluid studies  Continue Rocephin and Azithromycin  Wean O2 as tolerated  Further recommendations pending results of thoracentesis fluid studies, w/ possible CT chest w/ IV Contrast  [place] evaluation as outpatient    \"Thank you for asking us to see this patient\"    Questions and concerns addressed. Electronically signed by   Sheyla Mckenzie MD on 1/31/2023 at 9:15 AM     Addendum by Dr. Mary Collins MD:  Patient seen by me independently including key components of medical care. Face to face evaluation and examination was performed. Case discussed with Shilo Rizzo MD - Family medicine resident physician. Italicized font, if present,  represents changes to the note made by me. More than 50% of the encounter time involved with patient care by the Pulmonary & Critical care service team spent by me. Please see my modifications mentioned below:  Thoracentesis fluid analysis results.    Performed on: 31 January 2023  Side: right side of chest .  By IR: Yes  Amount of pleural fluid drained: 1.8 L    Lab Results   Component Value Date/Time    PHFL 6.82 01/31/2023 02:52 PM    COLORU YELLOW 01/31/2023 02:52 PM    LDFL 977 01/31/2023 02:52 PM    PROTEINFL 5.7 01/31/2023 02:52 PM    TRIG 381 07/19/2017 09:55 AM     Lab Results   Component Value Date/Time    PROT 6.5 01/31/2023 09:00 AM    PROT 7.3 10/16/2013 12:00 AM     CYTOLOGY: Pending    Serum LDH: 180  LDH ratio i.e Pleural fluid LDH/ Serum LDH: 977/180  Total protein ratio i.e Pleural fluid Total protein/ Serum Total protein: 5.7/6.5    Pleural fluid cultures were negative so far. Gram stain of pleural fluid is negative for any organisms. Chest x-ray performed after thoracentesis today:  No pneumothorax following right-sided thoracentesis. Plan:  CT scan of chest with IV contrast to further evaluate right side of the chest opacity  Cardiothoracic surgery consult by Dr. Candi Mcgovern MD for further evaluation and management of right-sided empyema. Infectious disease consultation with Dr. Tung Haddad MD for antibiotic management of right-sided empyema with a history of recurrent pneumonias.     Electronically signed by   Darrick Flores MD on 1/31/2023 at 6:44 PM

## 2023-01-31 NOTE — ED NOTES
ED to inpatient nurses report    Chief Complaint   Patient presents with    Shortness of Breath      Present to ED from home  LOC: alert and orientated to name, place, date  Vital signs   Vitals:    01/30/23 1846 01/30/23 1955 01/30/23 2051 01/30/23 2134   BP: 137/72 (!) 158/82 (!) 153/49 (!) 140/73   Pulse: (!) 127 (!) 128 (!) 129 (!) 130   Resp: 28 (!) 32 26 29   Temp:    (!) 101.4 °F (38.6 °C)   TempSrc:    Oral   SpO2: 97% 97% 98% 97%   Weight:       Height:          Oxygen Baseline room air    Current needs required 2 Liters Bipap/Cpap No  LDAs:   Peripheral IV 01/30/23 Right Antecubital (Active)   Site Assessment Clean, dry & intact 01/30/23 2016     Mobility: Requires assistance * 2  Pending ED orders: none  Present condition: stable      C-SSRS Risk of Suicide: No Risk  Swallow Screening    Preferred Language: [place]     Electronically signed by Calin Pimentel RN on 1/30/2023 at 10:21 PM      Calin Pimentel RN  01/30/23 8567

## 2023-01-31 NOTE — H&P
Internal Medicine Specialties  H&P  1/31/2023  12:57 PM    Patient:  Marry Gregorio  YOB: 1980    MRN: [id number]   Acct:  [id number]   [id number]  Primary Care Physician: Kelly Moreno MD    Chief Complaint:  Chief Complaint   Patient presents with    Shortness of Breath       History of Present Illness: The patient is a 40 y.o. male  who presents with SOB. Pt states he was treated in December for possible pneumonia. He took antibiotics and felt better for a few weeks. In mid January he went to ED for cough SOB and fever and workup was negative. He returned to his PCP yesterday for same symptoms, cough, fever, congestion, and was given IM Rocephin and Levaquin, upon review of CXR he was told to go to ED for further workup. In the emergency department he was saturating 89 to 90% on room air and was placed on 2 L supplemental oxygen. CXR-   Large right pleural effusion and suspected underlying airspace infiltrates. EKG- Sinus tachycardia  Incomplete right bundle branch block  Borderline ECG  When compared with ECG of 04-JAN-2019 11:54,  No significant change was found    Trop BNP negative, WBC 24.1, Segs 20.5, pro adelita 1.41    Patient admitted for acute pneumonia. Past Medical History:        Diagnosis Date    Azoospermia     Hypogonadism        Past Surgical History:        Procedure Laterality Date    CHOLECYSTECTOMY      stent placement as well. Home Medications: Medications Prior to Admission: levoFLOXacin (LEVAQUIN) 750 MG tablet, Take 1 tablet by mouth daily for 7 days (Patient not taking: Reported on 1/31/2023)  ANDRODERM 4 MG/24HR PT24, Place 8 mg onto the skin daily for 30 days. Two patches daily.   Fexofenadine HCl (ALLEGRA PO), Take by mouth daily (Patient not taking: Reported on 1/30/2023)  ibuprofen (ADVIL;MOTRIN) 200 MG tablet, Take 200 mg by mouth every 6 hours as needed for Pain (Patient not taking: Reported on 1/30/2023)      Allergies:  Patient has no known allergies. Social History:    reports that he has never smoked. He has never used smokeless tobacco. He reports that he does not drink alcohol and does not use drugs.     Occupation: Factory    Family History:       Problem Relation Age of Onset    Cancer Mother         hodgkins    No Known Problems Father        Review of Systems:    General ROS: fatigue  Psychological ROS: negative  Hematological and Lymphatic ROS: negative  Endocrine ROS: negative  Vision:negative  ENT ROS: Denies  Respiratory ROS: SOB, cough non productive  Cardiovascular ROS: occ chest pressure when SOB  Gastrointestinal ROS: diarrhea  Genito-Urinary ROS: no dysuria, trouble voiding, or hematuria  Musculoskeletal ROS: negative  Neurological ROS: no TIA or stroke symptoms  Dermatological ROS: negative  Weight:no change in weight  Appetite:decreased      Physical Exam:    Vitals:Patient Vitals for the past 24 hrs:   BP Temp Temp src Pulse Resp SpO2 Height Weight   01/31/23 1204 133/89 99.9 °F (37.7 °C) Oral (!) 125 24 99 % -- --   01/31/23 0917 -- -- -- -- -- 94 % -- --   01/31/23 0830 -- -- -- -- -- 93 % -- --   01/31/23 0802 (!) 144/83 98.3 °F (36.8 °C) Oral (!) 125 26 92 % -- --   01/31/23 0458 -- -- -- (!) 118 24 -- -- --   01/31/23 0338 139/70 99 °F (37.2 °C) Oral (!) 133 26 95 % -- --   01/31/23 0310 110/71 98.7 °F (37.1 °C) Oral (!) 134 25 94 % -- --   01/31/23 0148 (!) 109/48 -- -- (!) 122 30 97 % -- --   01/31/23 0042 -- 98.2 °F (36.8 °C) Oral (!) 108 26 96 % -- --   01/30/23 2352 (!) 115/59 -- -- (!) 112 26 96 % -- --   01/30/23 2249 125/65 -- -- (!) 117 22 96 % -- --   01/30/23 2134 (!) 140/73 (!) 101.4 °F (38.6 °C) Oral (!) 130 29 97 % -- --   01/30/23 2051 (!) 153/49 -- -- (!) 129 26 98 % -- --   01/30/23 1955 (!) 158/82 -- -- (!) 128 (!) 32 97 % -- --   01/30/23 1846 137/72 -- -- (!) 127 28 97 % -- --   01/30/23 1802 -- -- -- -- 30 97 % -- --   01/30/23 1747 128/69 99 °F (37.2 °C) Oral (!) 135 (!) 40 90 % 5' 10\" (1.778 m) 297 lb (134.7 kg)     Weight: Weight: 297 lb (134.7 kg)     24 hour intake/output:  Intake/Output Summary (Last 24 hours) at 1/31/2023 1257  Last data filed at 1/31/2023 1673  Gross per 24 hour   Intake 240 ml   Output --   Net 240 ml       General appearance - alert in acute distress, just came back from bathroom had no O2 on. Eyes - pupils equal and reactive, extraocular eye movements intact  Mouth - mucous membranes moist, pharynx normal without lesions  Neck - supple  Chest - Diminished posterior right lower and middle lobe currently on 6LNC tachypnea   Heart - tachy  Abdomen - soft, nontender, positive bs  Obese: yes  Neurological - alert, oriented, normal speech, no focal findings or movement disorder noted  Musculoskeletal - no joint tenderness, deformity or swelling  Extremities - peripheral pulses normal, no pedal edema, no clubbing or cyanosis  Skin - diaphoretic     Review of Labs and Diagnostic Testing:    CBC:   Recent Labs     01/31/23  0453   WBC 25.9*   HGB 13.5*   HCT 40.6*   MCV 82.4        BMP:   Recent Labs     01/31/23  0453   *   K 3.8      CO2 22*   BUN 13   CREATININE 0.7   CALCIUM 8.6   GLUCOSE 118*     PT/INR:   Recent Labs     01/31/23  0900   INR 1.42*     APTT: No results for input(s): APTT in the last 72 hours. Lipids:   Recent Labs     01/30/23  1800 01/31/23  0900   ALKPHOS 77  --    ALT 20  --    AST 12  --    BILITOT 1.5*  --    BILIDIR 0.5*  --    LABALBU 4.1 3.8   LIPASE 19.5  --      Troponin:   Recent Labs     01/30/23  1800   TROPONINT < 0.010        Imaging:  XR CHEST (2 VW)    Result Date: 1/30/2023  PROCEDURE: XR CHEST (2 VW) CLINICAL INFORMATION: SOB (shortness of breath), Acute cough. COMPARISON: No prior study. TECHNIQUE: PA and lateral views the chest. FINDINGS: Right heart border is obscured heart appears to be normal in size  The mediastinum is not widened. There is a large right pleural effusion I suspect right lower lobe infiltrates.  The left lung is clear. The pulmonary vascularity is normal. No suspicious osseous lesions are present. Large right pleural effusion and suspected underlying airspace infiltrates. **This report has been created using voice recognition software. It may contain minor errors which are inherent in voice recognition technology. ** Final report electronically signed by Dr. Lila Cherry on 1/30/2023 4:23 PM        Assessment  Acute hypoxic respiratory failure  CAP with significant right sided pleural effusion  Obese    Plan:    Infectious disease and Pulmonary consulted  Continue Rocephin and Zithromax  Labs/ cultures to obtain today and CBC BMP in am  Thoracentesis today  Wean O2 as tolerated  PTOT  Incentive Spirometry and acapella        Electronically signed by EFREN Arvizu CNP on 1/31/2023 at 12:57 PM      Assessment and plan of care discussed with supervising physician, Dr Ruby Downey.           Copy: Primary Care Physician: Nidhi Dominique MD

## 2023-02-01 ENCOUNTER — APPOINTMENT (OUTPATIENT)
Dept: CT IMAGING | Age: 43
DRG: 193 | End: 2023-02-01
Payer: COMMERCIAL

## 2023-02-01 PROBLEM — J90 PLEURAL EFFUSION, RIGHT: Status: ACTIVE | Noted: 2023-02-01

## 2023-02-01 PROBLEM — R93.89 ABNORMAL CHEST X-RAY: Status: ACTIVE | Noted: 2023-02-01

## 2023-02-01 LAB
ANION GAP SERPL CALC-SCNC: 14 MEQ/L (ref 8–16)
BASOPHILS ABSOLUTE: 0 THOU/MM3 (ref 0–0.1)
BASOPHILS NFR BLD AUTO: 0.2 %
BUN SERPL-MCNC: 12 MG/DL (ref 7–22)
CALCIUM SERPL-MCNC: 8.8 MG/DL (ref 8.5–10.5)
CHARACTER, BODY FLUID: ABNORMAL
CHLORIDE SERPL-SCNC: 98 MEQ/L (ref 98–111)
CO2 SERPL-SCNC: 24 MEQ/L (ref 23–33)
COLOR FLD: YELLOW
CREAT SERPL-MCNC: 0.7 MG/DL (ref 0.4–1.2)
DEPRECATED RDW RBC AUTO: 40 FL (ref 35–45)
EKG ATRIAL RATE: 135 BPM
EKG P AXIS: 91 DEGREES
EKG P-R INTERVAL: 144 MS
EKG Q-T INTERVAL: 292 MS
EKG QRS DURATION: 100 MS
EKG QTC CALCULATION (BAZETT): 438 MS
EKG R AXIS: 15 DEGREES
EKG T AXIS: 67 DEGREES
EKG VENTRICULAR RATE: 135 BPM
EOSINOPHIL NFR BLD AUTO: 1.2 %
EOSINOPHILS ABSOLUTE: 0.2 THOU/MM3 (ref 0–0.4)
ERYTHROCYTE [DISTWIDTH] IN BLOOD BY AUTOMATED COUNT: 13.2 % (ref 11.5–14.5)
GFR SERPL CREATININE-BSD FRML MDRD: > 60 ML/MIN/1.73M2
GLUCOSE SERPL-MCNC: 108 MG/DL (ref 70–108)
GRANULOCYTES NFR FLD AUTO: 89.5 %
HCT VFR BLD AUTO: 38.8 % (ref 42–52)
HGB BLD-MCNC: 12.5 GM/DL (ref 14–18)
IMM GRANULOCYTES # BLD AUTO: 0.18 THOU/MM3 (ref 0–0.07)
IMM GRANULOCYTES NFR BLD AUTO: 1 %
LYMPHOCYTES ABSOLUTE: 1.4 THOU/MM3 (ref 1–4.8)
LYMPHOCYTES NFR BLD AUTO: 8 %
MCH RBC QN AUTO: 27 PG (ref 26–33)
MCHC RBC AUTO-ENTMCNC: 32.2 GM/DL (ref 32.2–35.5)
MCV RBC AUTO: 83.8 FL (ref 80–94)
MONOCYTES ABSOLUTE: 1.3 THOU/MM3 (ref 0.4–1.3)
MONOCYTES NFR BLD AUTO: 7.6 %
MONONUC CELLS NFR FLD AUTO: 10.5 %
NEUTROPHILS NFR BLD AUTO: 82 %
NRBC BLD AUTO-RTO: 0 /100 WBC
NUC CELL # FLD AUTO: 3558 /CUMM (ref 0–500)
PATHOLOGIST REVIEW: ABNORMAL
PLATELET # BLD AUTO: 403 THOU/MM3 (ref 130–400)
PMV BLD AUTO: 9.1 FL (ref 9.4–12.4)
POTASSIUM SERPL-SCNC: 4.6 MEQ/L (ref 3.5–5.2)
RBC # BLD AUTO: 4.63 MILL/MM3 (ref 4.7–6.1)
RBC # FLD AUTO: 4000 /CUMM
SEGMENTED NEUTROPHILS ABSOLUTE COUNT: 14.5 THOU/MM3 (ref 1.8–7.7)
SODIUM SERPL-SCNC: 136 MEQ/L (ref 135–145)
SPECIMEN: ABNORMAL
TOTAL VOLUME RECEIVED BODY FLUID: 73 ML
WBC # BLD AUTO: 17.7 THOU/MM3 (ref 4.8–10.8)

## 2023-02-01 PROCEDURE — 36415 COLL VENOUS BLD VENIPUNCTURE: CPT

## 2023-02-01 PROCEDURE — 85025 COMPLETE CBC W/AUTO DIFF WBC: CPT

## 2023-02-01 PROCEDURE — 80048 BASIC METABOLIC PNL TOTAL CA: CPT

## 2023-02-01 PROCEDURE — 94669 MECHANICAL CHEST WALL OSCILL: CPT

## 2023-02-01 PROCEDURE — 97530 THERAPEUTIC ACTIVITIES: CPT

## 2023-02-01 PROCEDURE — 2700000000 HC OXYGEN THERAPY PER DAY

## 2023-02-01 PROCEDURE — 94760 N-INVAS EAR/PLS OXIMETRY 1: CPT

## 2023-02-01 PROCEDURE — 6370000000 HC RX 637 (ALT 250 FOR IP): Performed by: INTERNAL MEDICINE

## 2023-02-01 PROCEDURE — 97535 SELF CARE MNGMENT TRAINING: CPT

## 2023-02-01 PROCEDURE — 2580000003 HC RX 258: Performed by: INTERNAL MEDICINE

## 2023-02-01 PROCEDURE — 6360000002 HC RX W HCPCS: Performed by: RADIOLOGY

## 2023-02-01 PROCEDURE — 2060000000 HC ICU INTERMEDIATE R&B

## 2023-02-01 PROCEDURE — 97110 THERAPEUTIC EXERCISES: CPT

## 2023-02-01 PROCEDURE — 6360000004 HC RX CONTRAST MEDICATION: Performed by: INTERNAL MEDICINE

## 2023-02-01 PROCEDURE — 93010 ELECTROCARDIOGRAM REPORT: CPT | Performed by: INTERNAL MEDICINE

## 2023-02-01 PROCEDURE — 77012 CT SCAN FOR NEEDLE BIOPSY: CPT

## 2023-02-01 PROCEDURE — 6360000002 HC RX W HCPCS: Performed by: INTERNAL MEDICINE

## 2023-02-01 PROCEDURE — C1769 GUIDE WIRE: HCPCS

## 2023-02-01 PROCEDURE — 99252 IP/OBS CONSLTJ NEW/EST SF 35: CPT | Performed by: THORACIC SURGERY (CARDIOTHORACIC VASCULAR SURGERY)

## 2023-02-01 PROCEDURE — 99233 SBSQ HOSP IP/OBS HIGH 50: CPT | Performed by: INTERNAL MEDICINE

## 2023-02-01 PROCEDURE — 0W993ZZ DRAINAGE OF RIGHT PLEURAL CAVITY, PERCUTANEOUS APPROACH: ICD-10-PCS | Performed by: RADIOLOGY

## 2023-02-01 PROCEDURE — 97162 PT EVAL MOD COMPLEX 30 MIN: CPT

## 2023-02-01 PROCEDURE — 71260 CT THORAX DX C+: CPT

## 2023-02-01 RX ORDER — FENTANYL CITRATE 50 UG/ML
INJECTION, SOLUTION INTRAMUSCULAR; INTRAVENOUS
Status: COMPLETED | OUTPATIENT
Start: 2023-02-01 | End: 2023-02-01

## 2023-02-01 RX ORDER — HYDROCODONE BITARTRATE AND ACETAMINOPHEN 5; 325 MG/1; MG/1
1 TABLET ORAL EVERY 6 HOURS PRN
Status: DISCONTINUED | OUTPATIENT
Start: 2023-02-01 | End: 2023-02-07 | Stop reason: HOSPADM

## 2023-02-01 RX ORDER — MIDAZOLAM HYDROCHLORIDE 1 MG/ML
INJECTION INTRAMUSCULAR; INTRAVENOUS
Status: COMPLETED | OUTPATIENT
Start: 2023-02-01 | End: 2023-02-01

## 2023-02-01 RX ADMIN — SODIUM CHLORIDE: 9 INJECTION, SOLUTION INTRAVENOUS at 07:25

## 2023-02-01 RX ADMIN — MIDAZOLAM 0.5 MG: 1 INJECTION INTRAMUSCULAR; INTRAVENOUS at 16:11

## 2023-02-01 RX ADMIN — ACETAMINOPHEN 650 MG: 325 TABLET ORAL at 17:02

## 2023-02-01 RX ADMIN — FENTANYL CITRATE 25 MCG: 50 INJECTION, SOLUTION INTRAMUSCULAR; INTRAVENOUS at 16:11

## 2023-02-01 RX ADMIN — IBUPROFEN 200 MG: 200 TABLET, FILM COATED ORAL at 14:08

## 2023-02-01 RX ADMIN — MIDAZOLAM 0.5 MG: 1 INJECTION INTRAMUSCULAR; INTRAVENOUS at 16:05

## 2023-02-01 RX ADMIN — IBUPROFEN 200 MG: 200 TABLET, FILM COATED ORAL at 04:11

## 2023-02-01 RX ADMIN — IOPAMIDOL 80 ML: 755 INJECTION, SOLUTION INTRAVENOUS at 09:40

## 2023-02-01 RX ADMIN — IBUPROFEN 200 MG: 200 TABLET, FILM COATED ORAL at 20:00

## 2023-02-01 RX ADMIN — ACETAMINOPHEN 650 MG: 325 TABLET ORAL at 10:13

## 2023-02-01 RX ADMIN — SODIUM CHLORIDE, PRESERVATIVE FREE 10 ML: 5 INJECTION INTRAVENOUS at 20:06

## 2023-02-01 RX ADMIN — FENTANYL CITRATE 25 MCG: 50 INJECTION, SOLUTION INTRAMUSCULAR; INTRAVENOUS at 16:05

## 2023-02-01 RX ADMIN — AZITHROMYCIN MONOHYDRATE 500 MG: 250 TABLET ORAL at 02:06

## 2023-02-01 RX ADMIN — CEFTRIAXONE SODIUM 1000 MG: 1 INJECTION, POWDER, FOR SOLUTION INTRAMUSCULAR; INTRAVENOUS at 03:02

## 2023-02-01 ASSESSMENT — PAIN DESCRIPTION - LOCATION
LOCATION: HEAD
LOCATION: SHOULDER
LOCATION: BACK
LOCATION: SHOULDER
LOCATION: CHEST

## 2023-02-01 ASSESSMENT — PAIN SCALES - GENERAL
PAINLEVEL_OUTOF10: 3
PAINLEVEL_OUTOF10: 0
PAINLEVEL_OUTOF10: 2
PAINLEVEL_OUTOF10: 3
PAINLEVEL_OUTOF10: 0
PAINLEVEL_OUTOF10: 2
PAINLEVEL_OUTOF10: 2
PAINLEVEL_OUTOF10: 4
PAINLEVEL_OUTOF10: 5
PAINLEVEL_OUTOF10: 3
PAINLEVEL_OUTOF10: 3
PAINLEVEL_OUTOF10: 2
PAINLEVEL_OUTOF10: 5
PAINLEVEL_OUTOF10: 4

## 2023-02-01 ASSESSMENT — PAIN DESCRIPTION - DESCRIPTORS
DESCRIPTORS: ACHING

## 2023-02-01 ASSESSMENT — PAIN DESCRIPTION - ORIENTATION
ORIENTATION: RIGHT
ORIENTATION: MID
ORIENTATION: RIGHT

## 2023-02-01 ASSESSMENT — PAIN DESCRIPTION - FREQUENCY: FREQUENCY: CONTINUOUS

## 2023-02-01 ASSESSMENT — PAIN - FUNCTIONAL ASSESSMENT
PAIN_FUNCTIONAL_ASSESSMENT: PREVENTS OR INTERFERES SOME ACTIVE ACTIVITIES AND ADLS

## 2023-02-01 ASSESSMENT — PAIN DESCRIPTION - PAIN TYPE: TYPE: ACUTE PAIN

## 2023-02-01 ASSESSMENT — PAIN DESCRIPTION - ONSET: ONSET: ON-GOING

## 2023-02-01 NOTE — PROGRESS NOTES
Progress note      [facility]             Patient:  Ernestina Archer  YOB: 1980    MRN: [id number]   Acct:  [de-identified]   4K-04/[id number]  Primary Care Physician: Og De Leon MD    Admit Date: 1/30/2023           Subjective: Pt in chair on room air tolerating ok in no acute distress. Family at side. Pt stated had productive cough in shower of clear contents. Pt with continued right shoulder pain with SOB. Pt to have chest tube placed today.      Objective:      Physical Exam:    Vitals:Patient Vitals for the past 24 hrs:   BP Temp Temp src Pulse Resp SpO2 Weight   02/01/23 1130 118/61 98.9 °F (37.2 °C) Oral (!) 108 22 98 % --   02/01/23 1012 -- -- -- -- -- 98 % --   02/01/23 0750 119/61 98.6 °F (37 °C) Oral 100 22 98 % --   02/01/23 0315 -- -- -- -- -- -- 294 lb 8 oz (133.6 kg)   02/01/23 0300 (!) 122/58 97.5 °F (36.4 °C) Oral (!) 104 26 99 % --   01/31/23 2309 -- 98.6 °F (37 °C) -- -- -- -- --   01/31/23 2300 (!) 146/87 98.6 °F (37 °C) Axillary (!) 110 26 98 % --   01/31/23 1941 118/63 97.5 °F (36.4 °C) Oral 99 24 99 % --   01/31/23 1821 125/79 97.7 °F (36.5 °C) Oral (!) 107 22 99 % --   01/31/23 1742 109/72 98.1 °F (36.7 °C) Oral (!) 107 22 96 % --   01/31/23 1703 117/72 98.1 °F (36.7 °C) Oral (!) 105 22 97 % --   01/31/23 1636 115/69 98.4 °F (36.9 °C) Oral (!) 114 24 97 % --   01/31/23 1604 -- 99.1 °F (37.3 °C) Oral -- -- -- --   01/31/23 1602 (!) 118/93 -- -- (!) 125 24 97 % --   01/31/23 1535 129/66 98.6 °F (37 °C) Oral (!) 133 (!) 32 96 % --     Weight: Weight: 294 lb 8 oz (133.6 kg)     24 hour intake/output:  Intake/Output Summary (Last 24 hours) at 2/1/2023 1228  Last data filed at 2/1/2023 0445  Gross per 24 hour   Intake 2667.36 ml   Output 0 ml   Net 2667.36 ml       General appearance - alert and in no distress  Eyes - pupils equal and reactive, extraocular eye movements intact  Mouth - mucous membranes moist, pharynx normal without lesions  Neck - supple, no significant adenopathy  Chest - diminished/absent right posterior lower lobe and diminished mid posterior lobe. Heart - tachy regular rhythm, normal S1, S2, no murmurs, rubs, clicks or gallops  Abdomen - soft, nontender, obese, no masses or organomegaly, pos bs. Neurological - alert, oriented, normal speech, no focal findings or movement disorder noted  Musculoskeletal - no joint tenderness, deformity or swelling  Extremities - peripheral pulses normal, no pedal edema, no clubbing or cyanosis  Skin - normal coloration and turgor, no rashes, no suspicious skin lesions noted    Review of Labs and Diagnostic Testing:    CBC:   Recent Labs     02/01/23  0422   WBC 17.7*   HGB 12.5*   HCT 38.8*   MCV 83.8   *     BMP:   Recent Labs     02/01/23  0422      K 4.6   CL 98   CO2 24   BUN 12   CREATININE 0.7   CALCIUM 8.8   GLUCOSE 108     PT/INR:   Recent Labs     01/31/23  0900   INR 1.42*     APTT: No results for input(s): APTT in the last 72 hours.    Lipids:   Recent Labs     01/30/23  1800 01/31/23  0900   ALKPHOS 77  --    ALT 20  --    AST 12  --    BILITOT 1.5*  --    BILIDIR 0.5*  --    LABALBU 4.1 3.8   LIPASE 19.5  --      Troponin:   Recent Labs     01/30/23  1800   TROPONINT < 0.010        Imaging:  [unfilled]    EKG:      Diet: Diet NPO        Data:   Scheduled Meds: Scheduled Meds:   testosterone  2 patch Topical Q24H    sodium chloride flush  5-40 mL IntraVENous 2 times per day    [Held by provider] enoxaparin  30 mg SubCUTAneous Q12H    cefTRIAXone (ROCEPHIN) IV  1,000 mg IntraVENous Q24H    And    azithromycin  500 mg Oral Q24H     Continuous Infusions:   sodium chloride      sodium chloride 75 mL/hr at 02/01/23 0725     PRN Meds:.ibuprofen, sodium chloride flush, sodium chloride, ondansetron **OR** ondansetron, polyethylene glycol, acetaminophen **OR** acetaminophen, ipratropium-albuterol  Continuous Infusions:   sodium chloride sodium chloride 75 mL/hr at 02/01/23 0725         Assessment   Acute hypoxic respiratory failure  CAP with significant right sided pleural effusion  Obese    CT Chest- Moderate to large loculated right pleural effusion with a large area of consolidation involving the right middle lobe and right lower lobe. Circumferential right pleural thickening, right perihilar thickening, and prominent mediastinal and hilar lymph    nodes are associated. Differential considerations include airspace infection/inflammation however neoplasm is not excluded. 2. Chronic findings are discussed.      Plan   Pulmonary and ID following  Chest tube today  Continue Rocephin and Zithromax  Lovenox held for procedure- SCDs  PTOT  Bronchodilators  BMP CBC in am      Electronically signed by EFREN Arvizu - CNP on 2/1/2023 at 12:28 PM    Assessment and plan of care discussed with supervising physician, Dr Ruby Downey.    Pt seen and examined by me  D/w Jaylen Jacobo  Pt just back from CT placement   Already has 400ml out  Got him back to chair as he is comfortable in chair  Breathing slightly better  Pain at CT site  Lungs still dimiished     Electronically signed by Tony Fierro MD on 2/1/2023 at 5:01 PM

## 2023-02-01 NOTE — PLAN OF CARE
Problem: Discharge Planning  Goal: Discharge to home or other facility with appropriate resources  2/1/2023 0756 by Jamari Rausch RN  Outcome: Progressing  Flowsheets (Taken 2/1/2023 0750)  Discharge to home or other facility with appropriate resources: Identify barriers to discharge with patient and caregiver  2/1/2023 0230 by Elvia Bradshaw RN  Outcome: Progressing  Flowsheets (Taken 1/31/2023 1941)  Discharge to home or other facility with appropriate resources:   Identify barriers to discharge with patient and caregiver   Arrange for needed discharge resources and transportation as appropriate   Identify discharge learning needs (meds, wound care, etc)     Problem: Pain  Goal: Verbalizes/displays adequate comfort level or baseline comfort level  2/1/2023 0756 by Jamari Rausch RN  Outcome: Progressing  2/1/2023 0230 by Elvia Bradshaw RN  Outcome: Progressing  Flowsheets (Taken 1/31/2023 1941)  Verbalizes/displays adequate comfort level or baseline comfort level:   Encourage patient to monitor pain and request assistance   Assess pain using appropriate pain scale   Implement non-pharmacological measures as appropriate and evaluate response   Administer analgesics based on type and severity of pain and evaluate response   Consider cultural and social influences on pain and pain management   Notify Licensed Independent Practitioner if interventions unsuccessful or patient reports new pain     Problem: Respiratory - Adult  Goal: Achieves optimal ventilation and oxygenation  2/1/2023 0756 by Jamari Rausch RN  Outcome: Progressing  Flowsheets (Taken 2/1/2023 0750)  Achieves optimal ventilation and oxygenation: Assess for changes in respiratory status  2/1/2023 0230 by Elvia Bradshaw RN  Outcome: Progressing     Problem: Skin/Tissue Integrity - Adult  Goal: Skin integrity remains intact  2/1/2023 0756 by Jamari Rausch RN  Outcome: Progressing  Flowsheets (Taken 2/1/2023 0750)  Skin Integrity Remains Intact: Monitor for areas of redness and/or skin breakdown  2/1/2023 0230 by Lane Mishra RN  Outcome: Progressing  Flowsheets (Taken 1/31/2023 1941)  Skin Integrity Remains Intact:   Monitor for areas of redness and/or skin breakdown   Assess vascular access sites hourly   Every 4-6 hours minimum: Change oxygen saturation probe site   Every 4-6 hours: If on nasal continuous positive airway pressure, respiratory therapy assesses nares and determine need for appliance change or resting period     Problem: Infection - Adult  Goal: Absence of infection at discharge  2/1/2023 0756 by Shanna Martinez RN  Outcome: Progressing  Flowsheets (Taken 2/1/2023 0750)  Absence of infection at discharge: Assess and monitor for signs and symptoms of infection  2/1/2023 0230 by Lane Mishra RN  Outcome: Progressing  Flowsheets (Taken 1/31/2023 1941)  Absence of infection at discharge:   Assess and monitor for signs and symptoms of infection   Monitor lab/diagnostic results   Monitor all insertion sites i.e., indwelling lines, tubes and drains  Goal: Absence of infection during hospitalization  2/1/2023 0756 by Shanna Martinez RN  Outcome: Progressing  Flowsheets (Taken 2/1/2023 0750)  Absence of infection during hospitalization: Assess and monitor for signs and symptoms of infection  2/1/2023 0230 by Lane Mishra RN  Outcome: Progressing  Flowsheets (Taken 1/31/2023 1941)  Absence of infection during hospitalization:   Assess and monitor for signs and symptoms of infection   Monitor lab/diagnostic results   Monitor all insertion sites i.e., indwelling lines, tubes and drains     Problem: Safety - Adult  Goal: Free from fall injury  2/1/2023 0756 by Shanna Martinez RN  Outcome: Progressing  2/1/2023 0230 by Lane Mishra RN  Outcome: Progressing   Care plan reviewed with patient and family. Patient and family verbalize understanding of the plan of care and contribute to goal setting.

## 2023-02-01 NOTE — CONSULTS
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 He now enters the hospital with shortness of breath and pneumonia with a postpneumonic empyema in his right chest.  I have reviewed the chest x-ray is. CAT scan has not been performed yet. Chest x-ray from yesterday after his thoracentesis still shows a significant right pleural effusion. Most of the right chest is opacified. He reports that 1.5 L of fluid was drained from his chest via thoracentesis. Comparing the x-ray to yesterday does not show any change. I found the chest x-ray from January 6 which was normal and without disease on the right side. His symptoms have been classic for pneumonia. I am awaiting CAT scan of the chest to define the extent of his empyema. It appears to be early, 3 weeks or less and thrombolytic therapy with tPA and dornase may be successful. My only concern is that after 1.5 L removal of fluid yesterday his chest x-ray has not changed. His right lung is still significantly collapsed. It may be entrapped in which case he may need decortication. I have discussed surgical options with him as well as tube drainage options. I suggest placement of a chest tube after his CAT scan has been performed. I have spent 45 minutes for this consult.

## 2023-02-01 NOTE — PROGRESS NOTES
1522 Pt arrived to CT holding. Skin natural for race, warm, dry. Respirations even and unlabored. 65 Dr. Kris Toussaint in to evaluate pt and explain procedure. 1530 Consent obtained. Pt verbalizes agreement of site of procedure and procedure to be performed. 1559 Pt positioned left side down on CT table. Monitor applied. 1601 Pre scans complete. 1607 Procedure started with Dr. Kris Toussaint  [phone] Procedure complete. Right chest tube sutured in place, covered with gauze and opsite. 300 ml yellow fluid removed via chest tube, tube to atrium dry seal  1631 Post scans complete. 1638 Pt back to bed. Positioned for comfort. 1643 Report called to Kailee Mendoza, [address] Pt transported to  via bed. Skin natural for race, warm, dry. Respirations even and unlabored. No complaints voiced at this time.

## 2023-02-01 NOTE — PROGRESS NOTES
[facility] ICU STEPDOWN TELEMETRY 4K  Occupational Therapy  Daily Note  Time:   Time In: 1127  Time Out: 1153  Timed Code Treatment Minutes: 26 Minutes  Minutes: 26          Date: 2023  Patient Name: Naomi Ocasio,   Gender: male      Room: [facility]04/004-A  MRN: [id number]  : 1980  (40 y.o.)  Referring Practitioner: Erik Bliss MD  Diagnosis: Acute Pneumonia  Additional Pertinent Hx: The patient is a 40 y.o. male  who presents with SOB. Pt states he was treated in December for possible pneumonia. He took antibiotics and felt better for a few weeks. In mid January he went to ED for cough SOB and fever and workup was negative. He returned to his PCP yesterday for same symptoms, cough, fever, congestion, and was given IM Rocephin and Levaquin, upon review of CXR he was told to go to ED for further workup. In the emergency department he was saturating 89 to 90% on room air and was placed on 2 L supplemental oxygen. CXR-   Large right pleural effusion and suspected underlying airspace infiltrates. \"    Restrictions/Precautions:  Restrictions/Precautions: General Precautions     SUBJECTIVE: Pt sitting in the recliner upon arrival, pt agreeable to OT session, RN gave verbal approval for session     PAIN: 0/10:     Vitals: Oxygen: 92-94% on RA  Heart Rate: 100-112 with activity    COGNITION: WFL    ADL:   Footwear Management: Maximum Assistance. Due to increased difficulty with bending over due to SOB . BALANCE:  Standing Balance: Supervision. With no AE in preparation for functional mob    BED MOBILITY:  Not Tested    TRANSFERS:  Sit to Stand:  Stand By Assistance. From the recliner  Stand to Sit: Stand By Assistance. Back to the recliner    FUNCTIONAL MOBILITY:  Assistive Device: None  Assist Level:  Stand By Assistance.    Distance:  HH distances with a slow steady pace, and pt noted to be SOB, however oxygen levels at 92-93% during activity  with 1 standing rest break    ASSESSMENT:     Activity Tolerance:  Patient tolerance of  treatment: good. Discharge Recommendations: Home with Home Health OT  Equipment Recommendations: Equipment Needed: No  Plan: Times Per Week: 2-3x  Times Per Day: Once a day  Current Treatment Recommendations: Balance training, Strengthening, Functional mobility training, Endurance training, Equipment evaluation, education, & procurement, Self-Care / ADL, Safety education & training, Patient/Caregiver education & training, Home management training    Patient Education  Patient Education: ADL's    Goals  Short Term Goals  Time Frame for Short Term Goals: Until discharge  Short Term Goal 1: Pt will complete BADL routine with EC techniques Indep to increase endurance in home environment. Short Term Goal 2: Pt will complete mobility to/from BR and HH distances with Indep while O2 sats remain above 90% to increase indep and endurance with all toileting. Additional Goals?: No    Following session, patient left in safe position with all fall risk precautions in place.

## 2023-02-01 NOTE — PROGRESS NOTES
[address] [facility] PHYSICAL THERAPY  EVALUATION  [facility] ICU STEPDOWN TELEMETRY 4K - 4K-04/004-A    Time In: 1421  Time Out: 1445  Timed Code Treatment Minutes: 10 Minutes  Minutes: 24          Date: 2023  Patient Name: Jihan Reilly,  Gender:  male        MRN: [id number]  : 1980  (40 y.o.)      Referring Practitioner: Lani Brooke MD  Diagnosis: Pleural effusion, right  Additional Pertinent Hx: This is a very pleasant 40 y.o. male who was admitted to the hospital with a chief complaints of cough and shortness of breath for three days duration. He says he recently had a pleural effusion in December with pneumonia that was treated, had temporary improvement, then he began to feel right sided pain once again this weekend along with above symptoms and had a fever of 102 F  night. He admitted to somewhat productive cough but has not been able to expel sputum. Admits to right sided chest and shoulder pain. Denies nausea, vomiting, hemoptysis, lower extremity pitting edema, and UTI symptoms. Denies recent travel and sick contacts. Is not up to date with Influenza and COVID vaccines. CXR from OSH one day ago significant for very large right sided pleural effusion and patient presented to the ED at [facility] and was admitted. He was febrile upon arrival with leukocytosis 24.1 and tachycardia. Procal elevated 1.41, lactate wnl. He was given Rocephin and Levaquin upon arrival in ED. Required 2L O2 in ED. Now on 6L O2 NC. No O2 at baseline. Continues to be tachycardic, now afebrile and patient admits to slight improvement since arrival. Has had three episodes of watery diarrhea since admission. Pt to have chest tube placement.      Restrictions/Precautions:  Restrictions/Precautions: General Precautions, Fall Risk    Subjective:  Chart Reviewed: Yes  Patient assessed for rehabilitation services?: Yes  Family / Caregiver Present: No  Subjective: RN approved session, pt is seated in recliner, agreeable to PT. Pt to have chest tube placement this afternoon. General:  Overall Orientation Status: Within Functional Limits  Vision: Within Functional Limits  Hearing: Within functional limits       Pain: denies    Vitals: Oxygen: 93% at rest and after mobility on room air  Heart Rate: 117 at rest, 127 during ambulation    Social/Functional History:    Lives With: Family (6 kids (8-14))  Type of Home: House  Home Layout: Two level  Home Access: Level entry     Bathroom Shower/Tub: Walk-in shower  Bathroom Toilet: Standard  Bathroom Accessibility: Accessible       ADL Assistance: Independent  Homemaking Assistance: Independent  Homemaking Responsibilities: Yes  Ambulation Assistance: Independent  Transfer Assistance: Independent    Active : Yes  Mode of Transportation: Car  Occupation: Full time employment  Type of Occupation: [employer]       OBJECTIVE:  Range of Motion:  Bilateral Lower Extremity: WFL    Strength:  Bilateral Lower Extremity: WFL    Balance:  Static Standing Balance: Stand By Assistance, X 1  Dynamic Standing Balance: Stand By Assistance, X 1    Bed Mobility:  Not Tested    Transfers:  Sit to Stand: Stand By Assistance, X 1  Stand to Sit:Stand By Assistance, X 1    Ambulation:  Stand By Assistance, X 1  Distance: 40 feet  Surface: Level Tile  Device:No Device  Gait Deviations:  Decreased Step Length Bilaterally, Decreased Weight Shift Bilaterally, Decreased Trunk Rotation, Decreased Heel Strike on Right, Decreased Heel Strike Bilaterally, and Mild Path Deviations  **Slow pace, pt SOB,  when up; distance limited due to elevated HR and SOB    Exercise:  Patient was guided in 1 set(s) 10 reps of exercise to both lower extremities. Ankle pumps, Seated marches, and Long arc quads. Exercises were completed for increased independence with functional mobility.     Functional Outcome Measures: Not completed       ASSESSMENT:  Activity Tolerance:  Patient tolerance of  treatment: good. Treatment Initiated: Treatment and education initiated within context of evaluation. Evaluation time included review of current medical information, gathering information related to past medical, social and functional history, completion of standardized testing, formal and informal observation of tasks, assessment of data and development of plan of care and goals. Treatment time included skilled education and facilitation of tasks to increase safety and independence with functional mobility for improved independence and quality of life. Assessment: Body Structures, Functions, Activity Limitations Requiring Skilled Therapeutic Intervention: Decreased functional mobility , Decreased endurance, Decreased strength  Assessment: Frida Trevino is a 40 y.o. male that presents with pneumonia. he is ind prior to admission now requiring assist for basic mobility. Pt demonstrates a decrease in baseline by way of bed mobility, transfers and ambulation secondary to decreased activity tolerance, strength, fatigue, and balance deficits. Pt will benefit from skilled PT services throughout admission and beyond hospital discharge for improvements in functional mobility and in order to decrease fall risk and return pt to PLOF. Therapy Prognosis: Excellent    Requires PT Follow-Up: Yes    Discharge Recommendations:  Discharge Recommendations: 24 hour supervision or assist    Patient Education:      .     Patient Education  Education Given To: Patient  Education Provided: Role of Therapy, Plan of Care  Education Method: Verbal  Barriers to Learning: None  Education Outcome: Verbalized understanding       Equipment Recommendations:  Equipment Needed: No    Plan:  Current Treatment Recommendations: Strengthening, Balance training, Gait training, Functional mobility training, Transfer training, Endurance training, Patient/Caregiver education & training, Therapeutic activities  General Plan: (5x GM)    Goals:  Patient Goals : to go home  Short Term Goals  Time Frame for Short Term Goals: by discharge  Short Term Goal 1: Pt to transfer supine <--> sit mod I to enable pt to get in/out of bed. Short Term Goal 2: Pt to transfer sit <--> stand mod I for increased functional mobility. Short Term Goal 3: Pt to ambulate >250 feet without AD mod I for community re-entry. Long Term Goals  Time Frame for Long Term Goals : NA due to short length of stay. Following session, patient left in safe position with all fall risk precautions in place.

## 2023-02-01 NOTE — PROGRESS NOTES
[facility]      Patient - Haroldo Carroll   MRN -  [id number]   [facility] # - [de-identified]   - 1980      Date of Admission -  2023  5:45 PM  Date of evaluation -  2023  Room - --A   Hospital Day - 2  Consulting - Moon Becerra MD Primary Care Physician - Selena Umaña MD     Problem List      Active Hospital Problems    Diagnosis Date Noted    Acute pneumonia [J18.9] 2023     Priority: Medium     Reason for Consult    Large right pleural effusion  HPI   History Obtained From: Patient, nursing staff, and electronic medical record. Haroldo Carroll is a 40 y.o. 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 Per patient, patient started feeling sick on  with concurrent shortness of breath along with fevers at home. Also had right sided constant chest pain that was non exertional and constant. The next day, he had a dry cough. He was seen at outpatient primary clinic for the symptoms on . Chest x-ray at the time showed a right-sided large effusion. He was given one-time dose of IM Rocephin and 7 days of Levaquin. However, when chest x-ray results came back, he was sent to the ED due to concerns for a parapneumonic effusion. Of note, patient was diagnosed with and treated as outpatient in mid December at [facility] for a right-sided middle lobe pneumonia diagnosed on CTA chest.  He also reports a history of a right-sided pneumonia in 2019 that required inpatient stay for antibiotics. Otherwise, patient is not on O2 at home, has no smoking history, or history of aspiration. He was tachypneic to 40s on presentation. SP02 90% on RA.  Tachy to 130s. BP wnl. Had labored breathing and was placed on o2. WBC 24.1. Procal 1.41. Bmp, Lactate nml. Bedside u/s ED shows possible loculations. Got 1 L NS bolus and was admitted to step down. Covid 19/influenza negative. Currently, on 6 L of o2 satting 94%. Afebrile. On azithromycin and ctx. 2x blood cultures pending. Expectorated sputum culture/gram stain, urine legionella and strep pneumo antigen pending. Received 1x 30 mg subq lovenox at 1am on 1/31. He is having shortness of breath: Yes  Onset: gradual, worsening   Duration:4 days. Diurnal variation:  None. Functional status prior to beginning of symptoms: 8-10 block/s on level ground. Current functional capacity on level ground: 0 block/s on level ground. He can climb steps: Yes, before. Not currently. Flights of steps he can climb: 5  He denies orthopnea. He denies paroxysmal nocturnal dyspnea. He is having cough: Yes  Duration of cough: for 3 days. His cough is associated with sputum production: No.  Hemoptysis:No  Diurnal variation: None. Relieving factors: No  Aggravating factors: No        He is having chest pain:Yes  Duration:Days:4  Onset:gradual, unchanged. Location:predominantly on right chest  Nature/Quality:dull  Assessment:constant, worsened by laying down and improved with sitting up. Radiation:radiating to right shoulder  Scale:5/10  Relation ship to breathing: not significantly changed with inspiration. Sleep medicine HPI/Evaluation:     Usual time to go to bed during the work/regular day of week: 10-11pm  Usual time to wake up during the work//regular day of week: 7-8am     Sleep apnea symptoms:  Noticed to have loud snoring:Yes.  Noted by his family member- spouse  Witnessed apneas during sleep noticed: No.   History of choking and gasping sensation at night time: No.   History of headaches in the morning:No.   History of dry mouth in the morning: No.   History of palpitations during night time/nocturnal awakenings: No.   History of sweating during night time/nocturnal awakenings: No.       General:  History of head injury in the past: No.   History of seizures: No  Rest less legs syndrome symptoms:NO  History suggestive of periodic limb movements during sleep: NO  History suggestive of hypnagogic hallucinations: NO  History suggestive of hypnopompic hallucinations: NO  History suggestive of sleep talking:NO  History suggestive of sleep walking:NO  History suggestive of bruxism: NO    History suggestive of cataplexy: NO  History suggestive of sleep paralysis: NO     History regarding old sleep studies:  Prior history of sleep study: No.  Using CPAP device: No.  Currently using home Oxygen: NO.        Patient considerations:  Is the patient is ambulatory: Yes  Patient is currently using: None of these Wheelchair, Walker or [address] Para/Quadriplegic: NO  Hearing deficit : NO  Claustrophobic: NO  MDD : NO  Blind: NO  Incontinent: NO  Para/Quadraplegi: NO.   Need transportation to and from Sleep Center:NO     [place] Sleepiness Score:   Sitting and readin  Watching TV:0  Sitting inactive in a public place:1  Being a passenger in a motor vehicle for an hour or more:3  Lying down in the afternoon:3  Sitting and talking to someone:2  Sitting quietly after lunch (no alcohol): 2  Stopped for a few minutes in traffic while drivin  Total Score: 12     Mallampati Score: 4   Neck Circumference: 25.0 Inches    PMHx   Past Medical History      Diagnosis Date    Azoospermia     Hypogonadism       Past Surgical History        Procedure Laterality Date    CHOLECYSTECTOMY      stent placement as well.      Meds    Current Medications    testosterone  2 patch Topical Q24H    sodium chloride flush  5-40 mL IntraVENous 2 times per day    [Held by provider] enoxaparin  30 mg SubCUTAneous Q12H    cefTRIAXone (ROCEPHIN) IV  1,000 mg IntraVENous Q24H    And    azithromycin  500 mg Oral Q24H     ibuprofen, sodium chloride flush, sodium chloride, ondansetron **OR** ondansetron, polyethylene glycol, acetaminophen **OR** acetaminophen, ipratropium-albuterol  IV Drips/Infusions   sodium chloride      sodium chloride 75 mL/hr at 02/01/23 0725     Home Medications  Medications Prior to Admission: levoFLOXacin (LEVAQUIN) 750 MG tablet, Take 1 tablet by mouth daily for 7 days (Patient not taking: Reported on 1/31/2023)  ANDRODERM 4 MG/24HR PT24, Place 8 mg onto the skin daily for 30 days. Two patches daily. Fexofenadine HCl (ALLEGRA PO), Take by mouth daily (Patient not taking: Reported on 1/30/2023)  ibuprofen (ADVIL;MOTRIN) 200 MG tablet, Take 200 mg by mouth every 6 hours as needed for Pain (Patient not taking: Reported on 1/30/2023)  Diet    ADULT DIET; Regular  Allergies    Patient has no known allergies. Social History     Social History     Socioeconomic History    Marital status:      Spouse name: Mile Perez    Number of children: 6    Years of education: Not on file    Highest education level: Not on file   Occupational History    Not on file   Tobacco Use    Smoking status: Never    Smokeless tobacco: Never   Vaping Use    Vaping Use: Never used   Substance and Sexual Activity    Alcohol use: No    Drug use: No    Sexual activity: Not on file   Other Topics Concern    Not on file   Social History Narrative    Not on file     Social Determinants of Health     Financial Resource Strain: Low Risk     Difficulty of Paying Living Expenses: Not hard at all   Food Insecurity: No Food Insecurity    Worried About Running Out of Food in the Last Year: Never true    [address] in the Last Year: Never true   Transportation Needs: No Transportation Needs    Lack of Transportation (Medical): No    Lack of Transportation (Non-Medical):  No   Physical Activity: Not on file   Stress: Not on file   Social Connections: Not on file   Intimate Partner Violence: Not on file   Housing Stability: Not on file     Family History          Problem Relation Age of Onset    Cancer Mother         hodgkins    No Known Problems Father      Sleep History    Never diagnosed with sleep apnea in the past.  Occupational history   Occupation:  He is current working: Yes  Type of profession: full time job doing working at [employer]. History of tobacco smoking:No  .  History of recreational or IV drug use in the past:NO     History of exposure to coal mines/coal dust: NO  History of exposure to foundry dust/welding: NO  History of exposure to quarry/silica/sandblasting: NO  History of exposure to asbestos/working with breaks/ships: NO  History of exposure to farm dust: NO  History of recent travel to long distances: NO  History of exposure to birds, pigeons, or chickens in the past:NO  Pet animals at home:Yes, 1. History of pulmonary embolism in the past: No            History of DVT in the past:No      Past 24 hours    Patient sitting in chair in no acute distress, still on supplemental oxygen  Denies chest pain or shortness of breath. Patient seen by cardiothoracic surgery, plan for pigtail catheter placement. Vitals     height is 5' 10\" (1.778 m) and weight is 294 lb 8 oz (133.6 kg). His oral temperature is 98.6 °F (37 °C). His blood pressure is 119/61 and his pulse is 100. His respiration is 22 and oxygen saturation is 98%. Body mass index is 42.26 kg/m². SUPPLEMENTAL O2: O2 Flow Rate (L/min): 2 L/min     I/O      Intake/Output Summary (Last 24 hours) at 2/1/2023 1131  Last data filed at 2/1/2023 0445  Gross per 24 hour   Intake 2667.36 ml   Output 0 ml   Net 2667.36 ml     I/O last 3 completed shifts: In: 2907.4 [P.O.:840; I.V.:1968.5; IV Piggyback:98.9]  Out: 0    Patient Vitals for the past 96 hrs (Last 3 readings):   Weight   02/01/23 0315 294 lb 8 oz (133.6 kg)   01/30/23 1747 297 lb (134.7 kg)       Exam   Nursing note and vitals reviewed. General appearance: alert, appears short of breath, on 6L o2.    HEENT:  Normal cephalic, atraumatic without obvious deformity. Conjunctivae clear. Neck: Supple, with full range of motion. Respiratory:  dullness to percussion on right lower lung field w/ absent breath sounds. Cardiovascular: Normal rate, regular rhythm with normal S1/S2 without murmurs, rubs or gallops. Abdomen: Soft, non-tender, non-distended with normal bowel sounds. Musculoskeletal:  No clubbing, cyanosis or edema bilaterally. Full range of motion without deformity. Skin: No rashes or lesions. Neurological exam reveals alert, oriented, normal speech, no focal findings or movement disorder noted. Exam of extremities: no pedal or leg edema, no cyanosis     Labs  - Old records and notes have been reviewed in [facility]   ABG  No results found for: PH, PO2, PCO2, HCO3, O2SAT  No results found for: IFIO2, MODE, SETTIDVOL, SETPEEP  CBC  Recent Labs     01/30/23  1602 01/30/23  1800 01/31/23  0453 02/01/23  0422   WBC 21.3* 24.1* 25.9* 17.7*   RBC 5.04 5.15 4.93 4.63*   HGB 13.9* 14.0 13.5* 12.5*   HCT 40.9* 42.1 40.6* 38.8*   MCV 81.2 81.7 82.4 83.8   MCH 27.6 27.2 27.4 27.0   MCHC 34.0 33.3 33.3 32.2   RDW 12.7  --   --   --     394 388 403*   MPV 8.9* 9.1* 9.3* 9.1*      BMP  Recent Labs     01/30/23  1800 01/31/23  0453 02/01/23  0422    134* 136   K 3.7 3.8 4.6    100 98   CO2 23 22* 24   BUN 15 13 12   CREATININE 0.8 0.7 0.7   GLUCOSE 111* 118* 108   CALCIUM 9.4 8.6 8.8     LFT  Recent Labs     01/30/23  1800   AST 12   ALT 20   BILITOT 1.5*   ALKPHOS 77   LIPASE 19.5     TROP  Lab Results   Component Value Date/Time    TROPONINT < 0.010 01/30/2023 06:00 PM     BNP  No results for input(s): BNP in the last 72 hours. Lactic Acid  Recent Labs     01/31/23  0453   LACTA 1.7     INR  Recent Labs     01/31/23  0900   INR 1.42*     PTT  No results for input(s): APTT in the last 72 hours. Glucose  No results for input(s): POCGLU in the last 72 hours.   UA   Recent Labs     01/31/23  1452   31 [facility] .    PFTs   Not in the system    Sleep studies   None in the system    Cultures    Blood cultures x2 negative  Residual now a pneumonia, urine, negative  Respiratory culture, pending  Pleural fluid cytology, pending    EKG   1/30/23  Sinus tachycardia  Incomplete right bundle branch block  Borderline ECG  Echocardiogram   1/5/2019  Ejection fraction is visually estimated at 60%. Overall left ventricular function is normal.    Radiology    CXR  1/31/2023     PROCEDURE: XR CHEST 1 VIEW     There is a large right pleural effusion which has decreased since the prior exam.   There is cardiomegaly. There is no pneumothorax. Visualized portions of the upper abdomen are within normal limits. The osseous structures are intact. No acute fractures or suspicious osseous lesions. No pneumothorax following a right-sided thoracentesis. CT Scans  (See actual reports for details)    2/01/2023     CT CHEST W CONTRAST     FINDINGS: Heart/mediastinum: The heart size is normal. No pericardial effusion is identified. The aorta is not dilated. No aortic aneurysm or dissection is present. Paratracheal, hilar, and subcarinal lymph nodes measure up to 13 mm in short axis. Ill-defined right perihilar soft tissue thickening is present. Lungs: A moderate to large loculated right pleural effusion is present. Moderate right pleural effusion and circumferential right pleural thickening is visualized. A large area of consolidation is present in the right middle and right lower lobe extending from the right hilum the left lung is relatively clear. No pneumothorax is observed. Upper abdomen: Hepatosplenomegaly and hepatic steatosis appear unchanged. The gallbladder is surgically absent. No acute findings are noted in the limited images through the upper abdomen. Musculoskeletal: Multilevel degenerative disc disease is noted in the thoracic spine. The visualized skeletal structures appear intact.      1. Moderate to large loculated right pleural effusion with a large area of consolidation involving the right middle lobe and right lower lobe. Circumferential right pleural thickening, right perihilar thickening, and prominent mediastinal and hilar lymph  nodes are associated. Differential considerations include airspace infection/inflammation however neoplasm is not excluded. 2. Chronic findings are discussed. Assessment   Acute hypoxic respiratory failure 2/2 to right sided pleural effusion / empyema   Right sided pleural effusion s/p thoracentesis 1/31/23, suspecting exudative effusion based on pleural fluid protein / serum total protein > 0.5, Pleural fluid LDH / serum /180. Concern for undiagnosed [place]    Plan   Cardiothoracic surgery following, patient to undergo chest tube placement. If lung does not expend, he may need decortication   [place] eval as outpatient   Continue abx management per ID      Questions and concerns addressed. Electronically signed by   Manuel Hidalgo MD PGY-3 IM on 2/1/2023 at 11:31 AM     Addendum by Dr. Karly Hunt MD:  Patient seen by me independently including key components of medical care. Face to face evaluation and examination was performed. Case discussed with Vannesa Fuchs MD-resident physician. Italicized font, if present,  represents changes to the note made by me. More than 50% of the encounter time involved with patient care by the Pulmonary & Critical care service team spent by me. Please see my modifications mentioned below:  CT scan of chest with IV contrast performed on 1 February 2023:  1. Moderate to large loculated right pleural effusion with a large area of consolidation involving the right middle lobe and right lower lobe. Circumferential right pleural thickening, right perihilar thickening, and prominent mediastinal and hilar lymph  nodes are associated.  Differential considerations include airspace infection/inflammation however neoplasm is not excluded. 2. Chronic findings are discussed.        Infectious disease consultation with Dr. Taylor Bradley MD appreciated  Cardiothoracic surgery consult by Dr. Megan Maldonado MD appreciated    Electronically signed by   Eliceo Zavala MD on 2/1/2023 at 6:36 PM

## 2023-02-01 NOTE — PROGRESS NOTES
Formulation and discussion of sedation / procedure plans, risks, benefits, side effects and alternatives with patient and/or responsible adult completed. History and Physical reviewed and unchanged.     Electronically signed by Marie Gillespie MD on 2/1/23 at 3:37 PM EST

## 2023-02-01 NOTE — PLAN OF CARE
Problem: Discharge Planning  Goal: Discharge to home or other facility with appropriate resources  Outcome: Progressing  Flowsheets (Taken 1/31/2023 1941)  Discharge to home or other facility with appropriate resources:   Identify barriers to discharge with patient and caregiver   Arrange for needed discharge resources and transportation as appropriate   Identify discharge learning needs (meds, wound care, etc)     Problem: Pain  Goal: Verbalizes/displays adequate comfort level or baseline comfort level  Outcome: Progressing  Flowsheets (Taken 1/31/2023 1941)  Verbalizes/displays adequate comfort level or baseline comfort level:   Encourage patient to monitor pain and request assistance   Assess pain using appropriate pain scale   Implement non-pharmacological measures as appropriate and evaluate response   Administer analgesics based on type and severity of pain and evaluate response   Consider cultural and social influences on pain and pain management   Notify Licensed Independent Practitioner if interventions unsuccessful or patient reports new pain     Problem: Respiratory - Adult  Goal: Achieves optimal ventilation and oxygenation  Outcome: Progressing     Problem: Skin/Tissue Integrity - Adult  Goal: Skin integrity remains intact  Outcome: Progressing  Flowsheets (Taken 1/31/2023 1941)  Skin Integrity Remains Intact:   Monitor for areas of redness and/or skin breakdown   Assess vascular access sites hourly   Every 4-6 hours minimum: Change oxygen saturation probe site   Every 4-6 hours: If on nasal continuous positive airway pressure, respiratory therapy assesses nares and determine need for appliance change or resting period     Problem: Infection - Adult  Goal: Absence of infection at discharge  Outcome: Progressing  Flowsheets (Taken 1/31/2023 1941)  Absence of infection at discharge:   Assess and monitor for signs and symptoms of infection   Monitor lab/diagnostic results   Monitor all insertion sites i.e., indwelling lines, tubes and drains  Goal: Absence of infection during hospitalization  Outcome: Progressing  Flowsheets (Taken 1/31/2023 1941)  Absence of infection during hospitalization:   Assess and monitor for signs and symptoms of infection   Monitor lab/diagnostic results   Monitor all insertion sites i.e., indwelling lines, tubes and drains     Problem: Safety - Adult  Goal: Free from fall injury  Outcome: Progressing

## 2023-02-01 NOTE — H&P
[address] [address]  Sedation/Analgesia History & Physical    Pt Name: Drucie Dancer  MRN: [id number]  YOB: 1980  Provider Performing Procedure: Andrew Mckeon MD, MD  Primary Care Physician: Cathie Kan MD    Formulation and discussion of sedation / procedure plans, risks, benefits, side effects and alternatives with patient and/or responsible adult completed. PRE-PROCEDURE   DNR-CCA/DNR-CC []Yes [x]No  Brief History/Pre-Procedure Diagnosis: Loculated right pleural effusion          MEDICAL HISTORY  []CAD/Valve  []Liver Disease  []Lung Disease []Diabetes  []Hypertension []Renal Disease  []Additional information:       has a past medical history of Azoospermia and Hypogonadism. SURGICAL HISTORY   has a past surgical history that includes Cholecystectomy.   Additional information:       ALLERGIES   Allergies as of 01/30/2023    (No Known Allergies)     Additional information:       MEDICATIONS   Coumadin Use Last 5 Days [x]No []Yes  Antiplatelet drug therapy use last 5 days  [x]No []Yes  Other anticoagulant use last 5 days  [x]No []Yes    Current Facility-Administered Medications:     testosterone (ANDRODERM) patch 8 mg (Patient Supplied), 2 patch, Topical, Q24H, Zac Rodríguez MD    ibuprofen (ADVIL;MOTRIN) tablet 200 mg, 200 mg, Oral, Q6H PRN, Zac Rodríguez MD, 200 mg at 02/01/23 1408    sodium chloride flush 0.9 % injection 5-40 mL, 5-40 mL, IntraVENous, 2 times per day, Zac Rodríguez MD, 10 mL at 01/31/23 0935    sodium chloride flush 0.9 % injection 5-40 mL, 5-40 mL, IntraVENous, PRN, Zac Rodríguez MD    0.9 % sodium chloride infusion, , IntraVENous, PRN, Zac Rodríguez MD    [Held by provider] enoxaparin Sodium (LOVENOX) injection 30 mg, 30 mg, SubCUTAneous, Q12H, Zac Rodríguez MD, 30 mg at 01/31/23 0100    ondansetron (ZOFRAN-ODT) disintegrating tablet 4 mg, 4 mg, Oral, Q8H PRN **OR** ondansetron (ZOFRAN) injection 4 mg, 4 mg, IntraVENous, Q6H PRN, Santiago Busch MD    polyethylene glycol (GLYCOLAX) packet 17 g, 17 g, Oral, Daily PRN, Santiago Busch MD    acetaminophen (TYLENOL) tablet 650 mg, 650 mg, Oral, Q6H PRN, 650 mg at 02/01/23 1013 **OR** acetaminophen (TYLENOL) suppository 650 mg, 650 mg, Rectal, Q6H PRN, Santiago Busch MD    0.9 % sodium chloride infusion, , IntraVENous, Continuous, Santiago Busch MD, Last Rate: 75 mL/hr at 02/01/23 0725, New Bag at 02/01/23 0725    cefTRIAXone (ROCEPHIN) 1,000 mg in sodium chloride 0.9 % 50 mL IVPB (mini-bag), 1,000 mg, IntraVENous, Q24H, Stopped at 02/01/23 0332 **AND** azithromycin (ZITHROMAX) tablet 500 mg, 500 mg, Oral, Q24H, Santiago Busch MD, 500 mg at 02/01/23 0206    ipratropium-albuterol (DUONEB) nebulizer solution 1 ampule, 1 ampule, Inhalation, Q4H PRN, Lloyd Castaneda MD  Prior to Admission medications    Medication Sig Start Date End Date Taking? Authorizing Provider   levoFLOXacin (LEVAQUIN) 750 MG tablet Take 1 tablet by mouth daily for 7 days  Patient not taking: Reported on 1/31/2023 1/30/23 2/6/23  EFREN Peña - CNP   ANDRODERM 4 MG/24HR PT24 Place 8 mg onto the skin daily for 30 days. Two patches daily.  8/3/22 1/30/23  Tommy Liang MD   Fexofenadine HCl (ALLEGRA PO) Take by mouth daily  Patient not taking: Reported on 1/30/2023    Historical Provider, MD   ibuprofen (ADVIL;MOTRIN) 200 MG tablet Take 200 mg by mouth every 6 hours as needed for Pain  Patient not taking: Reported on 1/30/2023    Historical Provider, MD     Additional information:       VITAL SIGNS   Vitals:    02/01/23 1130   BP: 118/61   Pulse: (!) 108   Resp: 22   Temp: 98.9 °F (37.2 °C)   SpO2: 98%       PHYSICAL:   Heart:  [x]Regular rate and rhythm  []Other:    Lungs:  [x]Clear    []Other:    Abdomen: [x]Soft    []Other:    Mental Status: [x]Alert & Oriented  []Other:      PLANNED PROCEDURE   []Biospy []Arteriogram              [x]Drainage   []Mediport Insertion  []Fistulogram []IV access       []Vertebroplasty / Augmentation  []IVC filter []Dialysis catheter []Biliary drainage  []Other: []CAPD Catheter []Nephrostomy Tube / Stent  SEDATION  Planned agent:[x]Midazolam []Meperidine [x]Sublimaze []Dilaudid []Morphine     []Diazepam  []Other:     ASA Classification:  []1 [x]2 []3 []4 []5  Class 1: A normal healthy patient  Class 2: Pt with mild to moderate systemic disease  Class 3: Severe systemic disease or disturbance  Class 4: Severe systemic disorders that are already life threatening. Class 5: Moribund pt with little chances of survival, for more than 24 hours. Mallampati I Airway Classification:   []1 [x]2 []3 []4    [x]Pre-procedure diagnostic studies complete and results available. Comment:    [x]Previous sedation/anesthesia experiences assessed. Comment:    [x]The patient is an appropriate candidate to undergo the planned procedure sedation and anesthesia. (Refer to nursing sedation/analgesia documentation record)  [x]Formulation and discussion of sedation/procedure plan, risks, and expectations with patient and/or responsible adult completed. [x]Patient examined immediately prior to the procedure.  (Refer to nursing sedation/analgesia documentation record)    Danitza Acosta MD, MD  Electronically signed 2/1/2023 at 3:37 PM

## 2023-02-01 NOTE — PROGRESS NOTES
Progress note: Infectious diseases    Patient - Jayne Saint,  Age - 40 y.o.    - 1980      Room Number - 4K-04/004-A   MRN -  [id number]   Acct # - [de-identified]  Date of Admission -  2023  5:45 PM    SUBJECTIVE:   He has chest tube. pleural fluid suggestive of empyema. OBJECTIVE   VITALS    height is 5' 10\" (1.778 m) and weight is 294 lb 8 oz (133.6 kg). His oral temperature is 99.3 °F (37.4 °C). His blood pressure is 125/61 and his pulse is 113 (abnormal). His respiration is 25 and oxygen saturation is 97%. Wt Readings from Last 3 Encounters:   23 294 lb 8 oz (133.6 kg)   23 299 lb (135.6 kg)   22 289 lb (131.1 kg)       I/O (24 Hours)    Intake/Output Summary (Last 24 hours) at 2023 1717  Last data filed at 2023 1634  Gross per 24 hour   Intake 2667.36 ml   Output 300 ml   Net 2367.36 ml       General Appearance  Awake, alert, oriented,  obese.   HEENT - normocephalic, atraumatic, pale  conjunctiva,  anicteric sclera  Neck - Supple, no mass  Lungs -  Bilateral   air entry, right chest tube  Cardiovascular - Heart sounds are normal.     Abdomen - soft, not distended, nontender,   Neurologic -oriented  Skin - No bruising or bleeding  Extremities - No edema, no cyanosis, clubbing     MEDICATIONS:      testosterone  2 patch Topical Q24H    sodium chloride flush  5-40 mL IntraVENous 2 times per day    [Held by provider] enoxaparin  30 mg SubCUTAneous Q12H    cefTRIAXone (ROCEPHIN) IV  1,000 mg IntraVENous Q24H    And    azithromycin  500 mg Oral Q24H      sodium chloride      sodium chloride 75 mL/hr at 23 0725     HYDROcodone 5 mg - acetaminophen, ibuprofen, sodium chloride flush, sodium chloride, ondansetron **OR** ondansetron, polyethylene glycol, acetaminophen **OR** acetaminophen, ipratropium-albuterol      LABS:     CBC:   Recent Labs     23  1800 23  7663 02/01/23 0422   WBC 24.1* 25.9* 17.7*   HGB 14.0 13.5* 12.5*    388 403*     BMP:    Recent Labs     01/30/23  1800 01/31/23 0453 02/01/23 0422    134* 136   K 3.7 3.8 4.6    100 98   CO2 23 22* 24   BUN 15 13 12   CREATININE 0.8 0.7 0.7   GLUCOSE 111* 118* 108     Calcium:  Recent Labs     02/01/23  0422   CALCIUM 8.8      INR:   Recent Labs     01/31/23  0900   INR 1.42*     Hepatic:   Recent Labs     01/30/23  1800 01/31/23  0900   ALKPHOS 77  --    ALT 20  --    AST 12  --    PROT 7.9 6.5   BILITOT 1.5*  --    BILIDIR 0.5*  --    LABALBU 4.1 3.8     Amylase and Lipase:  Recent Labs     01/31/23 0453   LACTA 1.7     Lactic Acid:   Recent Labs     01/31/23 0453   LACTA 1.7        CULTURES:   UA:   Recent Labs     01/31/23  1452   COLORU YELLOW     Micro:   Lab Results   Component Value Date/Time    BC No growth 24 hours. 01/31/2023 04:53 AM        Problem list of patient:     Patient Active Problem List   Diagnosis Code    Hypogonadotropic hypogonadism syndrome, male ([facility] [address] E23.0    Hypogonadism in male E29.1    Mixed hyperlipidemia E78.2    Pneumonia due to organism J18.9    Acute pneumonia J18.9         ASSESSMENT/PLAN   Pneumonia with empyema: continue current treatment. He has chest tube , will follow  Obesity  Discussed with primary.       Zoe Eubanks MD, MD, FACP 2/1/2023 5:17 PM

## 2023-02-01 NOTE — PROGRESS NOTES
02/01/23 1400   Encounter Summary   Encounter Overview/Reason  Initial Encounter   Service Provided For: Patient   Referral/Consult From: Aneta   Last Encounter  02/01/23   Complexity of Encounter Low   Encounter    Type Initial Screen/Assessment   Spiritual/Emotional needs   Type Spiritual Support   Assessment/Intervention/Outcome   Assessment Calm;Coping   Intervention Nurtured Hope;Prayer (assurance of)/[place]   Outcome Comfort   Ayla Becerril' as he likes to be called is sitting up in a recliner with his feet up and cozy like he is ready for a nap. This  assessing he may need to sleep kept this spiritual care contact short. Ayla Becerril belongs to the [facility] and he does want the [employer] notified. He welcomed prayer for his healing and strength. Care Plan:  Continue spiritual and emotional care for patient and family. Including prayers.

## 2023-02-02 ENCOUNTER — APPOINTMENT (OUTPATIENT)
Dept: GENERAL RADIOLOGY | Age: 43
DRG: 193 | End: 2023-02-02
Payer: COMMERCIAL

## 2023-02-02 LAB
ANION GAP SERPL CALC-SCNC: 10 MEQ/L (ref 8–16)
BASOPHILS ABSOLUTE: 0 THOU/MM3 (ref 0–0.1)
BASOPHILS NFR BLD AUTO: 0.2 %
BUN SERPL-MCNC: 12 MG/DL (ref 7–22)
CALCIUM SERPL-MCNC: 8.5 MG/DL (ref 8.5–10.5)
CHLORIDE SERPL-SCNC: 102 MEQ/L (ref 98–111)
CO2 SERPL-SCNC: 28 MEQ/L (ref 23–33)
CREAT SERPL-MCNC: 0.7 MG/DL (ref 0.4–1.2)
DEPRECATED RDW RBC AUTO: 41 FL (ref 35–45)
EOSINOPHIL NFR BLD AUTO: 2.7 %
EOSINOPHILS ABSOLUTE: 0.3 THOU/MM3 (ref 0–0.4)
ERYTHROCYTE [DISTWIDTH] IN BLOOD BY AUTOMATED COUNT: 13.4 % (ref 11.5–14.5)
GFR SERPL CREATININE-BSD FRML MDRD: > 60 ML/MIN/1.73M2
GLUCOSE SERPL-MCNC: 98 MG/DL (ref 70–108)
HCT VFR BLD AUTO: 37 % (ref 42–52)
HGB BLD-MCNC: 12 GM/DL (ref 14–18)
IMM GRANULOCYTES # BLD AUTO: 0.21 THOU/MM3 (ref 0–0.07)
IMM GRANULOCYTES NFR BLD AUTO: 1.7 %
LYMPHOCYTES ABSOLUTE: 1.6 THOU/MM3 (ref 1–4.8)
LYMPHOCYTES NFR BLD AUTO: 13.6 %
MCH RBC QN AUTO: 27 PG (ref 26–33)
MCHC RBC AUTO-ENTMCNC: 32.4 GM/DL (ref 32.2–35.5)
MCV RBC AUTO: 83.3 FL (ref 80–94)
MONOCYTES ABSOLUTE: 1.3 THOU/MM3 (ref 0.4–1.3)
MONOCYTES NFR BLD AUTO: 10.7 %
NEUTROPHILS NFR BLD AUTO: 71.1 %
NRBC BLD AUTO-RTO: 0 /100 WBC
PLATELET # BLD AUTO: 431 THOU/MM3 (ref 130–400)
PMV BLD AUTO: 9.3 FL (ref 9.4–12.4)
POTASSIUM SERPL-SCNC: 4.4 MEQ/L (ref 3.5–5.2)
RBC # BLD AUTO: 4.44 MILL/MM3 (ref 4.7–6.1)
SEGMENTED NEUTROPHILS ABSOLUTE COUNT: 8.5 THOU/MM3 (ref 1.8–7.7)
SODIUM SERPL-SCNC: 140 MEQ/L (ref 135–145)
WBC # BLD AUTO: 12 THOU/MM3 (ref 4.8–10.8)

## 2023-02-02 PROCEDURE — 2580000003 HC RX 258: Performed by: PHYSICIAN ASSISTANT

## 2023-02-02 PROCEDURE — 71045 X-RAY EXAM CHEST 1 VIEW: CPT

## 2023-02-02 PROCEDURE — 99232 SBSQ HOSP IP/OBS MODERATE 35: CPT | Performed by: INTERNAL MEDICINE

## 2023-02-02 PROCEDURE — 6360000002 HC RX W HCPCS: Performed by: INTERNAL MEDICINE

## 2023-02-02 PROCEDURE — 36415 COLL VENOUS BLD VENIPUNCTURE: CPT

## 2023-02-02 PROCEDURE — 2060000000 HC ICU INTERMEDIATE R&B

## 2023-02-02 PROCEDURE — 85025 COMPLETE CBC W/AUTO DIFF WBC: CPT

## 2023-02-02 PROCEDURE — 6360000002 HC RX W HCPCS: Performed by: PHYSICIAN ASSISTANT

## 2023-02-02 PROCEDURE — 32561 LYSE CHEST FIBRIN INIT DAY: CPT | Performed by: PHYSICIAN ASSISTANT

## 2023-02-02 PROCEDURE — 2580000003 HC RX 258: Performed by: INTERNAL MEDICINE

## 2023-02-02 PROCEDURE — 6370000000 HC RX 637 (ALT 250 FOR IP): Performed by: INTERNAL MEDICINE

## 2023-02-02 PROCEDURE — 80048 BASIC METABOLIC PNL TOTAL CA: CPT

## 2023-02-02 PROCEDURE — 97530 THERAPEUTIC ACTIVITIES: CPT

## 2023-02-02 RX ADMIN — ACETAMINOPHEN 650 MG: 325 TABLET ORAL at 13:08

## 2023-02-02 RX ADMIN — CEFTRIAXONE SODIUM 1000 MG: 1 INJECTION, POWDER, FOR SOLUTION INTRAMUSCULAR; INTRAVENOUS at 03:24

## 2023-02-02 RX ADMIN — DORNASE ALFA 5 MG: 1 SOLUTION RESPIRATORY (INHALATION) at 12:00

## 2023-02-02 RX ADMIN — ALTEPLASE 6 MG: 2.2 INJECTION, POWDER, LYOPHILIZED, FOR SOLUTION INTRAVENOUS at 12:01

## 2023-02-02 RX ADMIN — IBUPROFEN 200 MG: 200 TABLET, FILM COATED ORAL at 15:11

## 2023-02-02 RX ADMIN — ACETAMINOPHEN 650 MG: 325 TABLET ORAL at 19:16

## 2023-02-02 RX ADMIN — AZITHROMYCIN MONOHYDRATE 500 MG: 250 TABLET ORAL at 03:25

## 2023-02-02 RX ADMIN — ENOXAPARIN SODIUM 30 MG: 100 INJECTION SUBCUTANEOUS at 15:06

## 2023-02-02 RX ADMIN — SODIUM CHLORIDE: 9 INJECTION, SOLUTION INTRAVENOUS at 03:22

## 2023-02-02 RX ADMIN — ACETAMINOPHEN 650 MG: 325 TABLET ORAL at 00:45

## 2023-02-02 RX ADMIN — SODIUM CHLORIDE, PRESERVATIVE FREE 10 ML: 5 INJECTION INTRAVENOUS at 22:58

## 2023-02-02 RX ADMIN — IBUPROFEN 200 MG: 200 TABLET, FILM COATED ORAL at 06:52

## 2023-02-02 ASSESSMENT — PAIN DESCRIPTION - ONSET
ONSET: ON-GOING

## 2023-02-02 ASSESSMENT — PAIN SCALES - GENERAL
PAINLEVEL_OUTOF10: 3
PAINLEVEL_OUTOF10: 0
PAINLEVEL_OUTOF10: 0
PAINLEVEL_OUTOF10: 4
PAINLEVEL_OUTOF10: 0
PAINLEVEL_OUTOF10: 3
PAINLEVEL_OUTOF10: 3
PAINLEVEL_OUTOF10: 5
PAINLEVEL_OUTOF10: 3
PAINLEVEL_OUTOF10: 0
PAINLEVEL_OUTOF10: 6
PAINLEVEL_OUTOF10: 5

## 2023-02-02 ASSESSMENT — PAIN DESCRIPTION - LOCATION
LOCATION: RIB CAGE
LOCATION: CHEST
LOCATION: CHEST
LOCATION: CHEST;SHOULDER
LOCATION: CHEST
LOCATION: HEAD
LOCATION: CHEST

## 2023-02-02 ASSESSMENT — PAIN DESCRIPTION - PAIN TYPE
TYPE: ACUTE PAIN

## 2023-02-02 ASSESSMENT — PAIN - FUNCTIONAL ASSESSMENT
PAIN_FUNCTIONAL_ASSESSMENT: PREVENTS OR INTERFERES SOME ACTIVE ACTIVITIES AND ADLS
PAIN_FUNCTIONAL_ASSESSMENT: ACTIVITIES ARE NOT PREVENTED

## 2023-02-02 ASSESSMENT — PAIN DESCRIPTION - ORIENTATION
ORIENTATION: RIGHT
ORIENTATION: MID
ORIENTATION: RIGHT

## 2023-02-02 ASSESSMENT — PAIN DESCRIPTION - FREQUENCY
FREQUENCY: CONTINUOUS
FREQUENCY: INTERMITTENT

## 2023-02-02 ASSESSMENT — PAIN DESCRIPTION - DESCRIPTORS
DESCRIPTORS: DISCOMFORT;SHARP
DESCRIPTORS: DISCOMFORT;STABBING
DESCRIPTORS: ACHING
DESCRIPTORS: ACHING;DISCOMFORT

## 2023-02-02 NOTE — PROGRESS NOTES
CT/CV Surgery Procedure Note    2/2/2023 12:34 PM    Intrapleural tPA/Dornase administration on right side:     Date of Operation:DATE@ 12:34 PM  Surgeon/  :Dr. Stan Small PA-C  Anesthesia/Local : None  Operation:  Intrapleural tPA/Dornase administration at bed side   Indication for the procedure: Alteplase ( tPA) administration intrapleurally daily for right side loculated/ pleural effusion/ empyema/ fibrothorax via pigtail catheter  Consent: Verbal consent obtained from patient after explaining the benefits,risks and associated complications including but not limited to development of pneumothorax and hemothorax with requirement of chest tube placement. Estimated Blood Loss: None   Complications: None. Description of Procedure: The patient was kept in sitting position comfortable with arms resting on a support table. The patient was identified and the procedure verified as tPA/Dornase administration intrapleurally on right side side of chest. A time out was held and the above information confirmed by registered nurse taking care of patient. By using standered aseptic and sterile precautions, the right side side pigtail catheter was uncapped and a connection kit was used to access the catheter. 6mg of Alteplase ( tPA) mixed with 30ml of 0.9 sterile NS and Dornase 5 mg mixed with 30 ml of sterile water were easily instilled into the pleural space. The pigtail catheter stop clock was turned to the off position (clamped). Nurse instructed to rotate the patient 90 degrees every 15 minutes for duration of clamp time, which is 3 hours. Nurse instructed to open stop clock (unclamp) after 3 hour duration or earlier if symptoms of SOB, chest discomfort, or cough persist since administration of medications.      Patient tolerated procedure well     The plan of care was discussed in detail with Dr. Tristen Aguirre     Electronically signed by Rosa Camilo PA-C on 2/2/2023 at 12:34 PM

## 2023-02-02 NOTE — PLAN OF CARE
Problem: Discharge Planning  Goal: Discharge to home or other facility with appropriate resources  Outcome: Progressing  Flowsheets (Taken 2/2/2023 1046)  Discharge to home or other facility with appropriate resources:   Identify barriers to discharge with patient and caregiver   Identify discharge learning needs (meds, wound care, etc)   Refer to discharge planning if patient needs post-hospital services based on physician order or complex needs related to functional status, cognitive ability or social support system   Arrange for needed discharge resources and transportation as appropriate  Note: Patient plans to return home with wife at discharge and no needs voiced at this time. Patient working with social work for discharge planning. Problem: Pain  Goal: Verbalizes/displays adequate comfort level or baseline comfort level  Outcome: Progressing  Flowsheets (Taken 2/2/2023 1046)  Verbalizes/displays adequate comfort level or baseline comfort level:   Encourage patient to monitor pain and request assistance   Administer analgesics based on type and severity of pain and evaluate response   Consider cultural and social influences on pain and pain management   Assess pain using appropriate pain scale   Implement non-pharmacological measures as appropriate and evaluate response   Notify Licensed Independent Practitioner if interventions unsuccessful or patient reports new pain  Note: Pain Assessment: 0-10  Pain Level: 0   Patient's Stated Pain Goal: 0 - No pain   Is pain goal met at this time?   No     Non-Pharmaceutical Pain Intervention(s): Rest       Problem: Respiratory - Adult  Goal: Achieves optimal ventilation and oxygenation  Outcome: Progressing  Flowsheets (Taken 2/2/2023 1046)  Achieves optimal ventilation and oxygenation:   Assess for changes in respiratory status   Assess for changes in mentation and behavior   Encourage broncho-pulmonary hygiene including cough, deep breathe, incentive spirometry   Assess and instruct to report shortness of breath or any respiratory difficulty   Respiratory therapy support as indicated   Position to facilitate oxygenation and minimize respiratory effort  Note: Patient remains on room air. Problem: Skin/Tissue Integrity - Adult  Goal: Skin integrity remains intact  Outcome: Progressing  Flowsheets (Taken 2/2/2023 1044)  Skin Integrity Remains Intact:   Monitor for areas of redness and/or skin breakdown   Assess vascular access sites hourly  Note: Patient turns self independently, assistance provided when needed. Patient educated on the importance of turning self frequently to prevent breakdown. No new skin changes noted thus far this shift. Will continue to monitor. Problem: Infection - Adult  Goal: Absence of infection at discharge  Outcome: Progressing  Flowsheets (Taken 2/2/2023 1046)  Absence of infection at discharge:   Assess and monitor for signs and symptoms of infection   Monitor lab/diagnostic results   Monitor all insertion sites i.e., indwelling lines, tubes and drains   Administer medications as ordered   Instruct and encourage patient and family to use good hand hygiene technique   Identify and instruct in appropriate isolation precautions for identified infection/condition  Note: Patient here for pneumonia, receiving IV antibiotics.       Problem: Infection - Adult  Goal: Absence of infection during hospitalization  Outcome: Progressing  Flowsheets (Taken 2/2/2023 1046)  Absence of infection during hospitalization:   Assess and monitor for signs and symptoms of infection   Monitor lab/diagnostic results   Monitor all insertion sites i.e., indwelling lines, tubes and drains   Administer medications as ordered   Instruct and encourage patient and family to use good hand hygiene technique   Identify and instruct in appropriate isolation precautions for identified infection/condition     Problem: Safety - Adult  Goal: Free from fall injury  Outcome: Progressing  Flowsheets (Taken 2/2/2023 1046)  Free From Fall Injury:   Instruct family/caregiver on patient safety   Based on caregiver fall risk screen, instruct family/caregiver to ask for assistance with transferring infant if caregiver noted to have fall risk factors  Note: Patient alert and oriented x4. Bed alarmed armed. Bed wheels locked. Bedside table in reach. Patient up with assistance when ambulating. Patient verbalizes and demonstrates the use of the call light. Hourly rounding being completed. Care plan reviewed with patient. Patient verbalizes understanding of the plan of care and contributes to goal setting.

## 2023-02-02 NOTE — PROGRESS NOTES
[facility]      Patient - Hakeem Zuniga   MRN -  [id number]   [facility] # - [de-identified]   - 1980      Date of Admission -  2023  5:45 PM  Date of evaluation -  2023  Room - --A   Hospital Day - 3  Consulting - Michael Marques MD Primary Care Physician - Fani Shelton MD     Problem List      Active Hospital Problems    Diagnosis Date Noted    Pleural effusion, right [J90] 2023     Priority: Medium    Abnormal chest x-ray [R93.89] 2023     Priority: Medium    Acute pneumonia [J18.9] 2023     Priority: Medium     Reason for Consult    Large right pleural effusion    Past 24 hrs   Seen this AM. Patient sitting in chair, on room air, and conversant. Approx 1L dark output in past 24 hrs from chest tube, ~200 ml overnight. He reports that breathing felt much better after initial chest tube placement and drainage. Currently no dyspnea at rest but reports to mild dyspnea on exertion. Also mild right sided chest pain on deep breathing, much improved from before. Afebrile overnight. No other issues. HPI   History Obtained From: Patient, nursing staff, and electronic medical record. Hakeem Zuniga is a 40 y.o. 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 Per patient, patient started feeling sick on  with concurrent shortness of breath along with fevers at home. Also had right sided constant chest pain that was non exertional and constant. The next day, he had a dry cough. He was seen at outpatient primary clinic for the symptoms on . Chest x-ray at the time showed a right-sided large effusion.   He was given one-time dose of IM Rocephin and 7 days of Levaquin. However, when chest x-ray results came back, he was sent to the ED due to concerns for a parapneumonic effusion. Of note, patient was diagnosed with and treated as outpatient in mid December at [facility] for a right-sided middle lobe pneumonia diagnosed on CTA chest.  He also reports a history of a right-sided pneumonia in 2019 that required inpatient stay for antibiotics. Otherwise, patient is not on O2 at home, has no smoking history, or history of aspiration. He was tachypneic to 40s on presentation. SP02 90% on RA. Tachy to 130s. BP wnl. Had labored breathing and was placed on o2. WBC 24.1. Procal 1.41. Bmp, Lactate nml. Bedside u/s ED shows possible loculations. Got 1 L NS bolus and was admitted to step down. Covid 19/influenza negative. Currently, on 6 L of o2 satting 94%. Afebrile. On azithromycin and ctx. 2x blood cultures pending. Expectorated sputum culture/gram stain, urine legionella and strep pneumo antigen pending. Received 1x 30 mg subq lovenox at 1am on 1/31. He is having shortness of breath: Yes  Onset: gradual, worsening   Duration:4 days. Diurnal variation:  None. Functional status prior to beginning of symptoms: 8-10 block/s on level ground. Current functional capacity on level ground: 0 block/s on level ground. He can climb steps: Yes, before. Not currently. Flights of steps he can climb: 5  He denies orthopnea. He denies paroxysmal nocturnal dyspnea. He is having cough: Yes  Duration of cough: for 3 days. His cough is associated with sputum production: No.  Hemoptysis:No  Diurnal variation: None. Relieving factors: No  Aggravating factors: No        He is having chest pain:Yes  Duration:Days:4  Onset:gradual, unchanged. Location:predominantly on right chest  Nature/Quality:dull  Assessment:constant, worsened by laying down and improved with sitting up.   Radiation:radiating to right shoulder  Scale:5/10  Relation ship to breathing: not significantly changed with inspiration. Sleep medicine HPI/Evaluation:     Usual time to go to bed during the work/regular day of week: 10-11pm  Usual time to wake up during the work//regular day of week: 7-8am     Sleep apnea symptoms:  Noticed to have loud snoring:Yes. Noted by his family member- spouse  Witnessed apneas during sleep noticed: No.   History of choking and gasping sensation at night time: No.   History of headaches in the morning:No.   History of dry mouth in the morning: No.   History of palpitations during night time/nocturnal awakenings: No.   History of sweating during night time/nocturnal awakenings: No.       General:  History of head injury in the past: No.   History of seizures: No  Rest less legs syndrome symptoms:NO  History suggestive of periodic limb movements during sleep: NO  History suggestive of hypnagogic hallucinations: NO  History suggestive of hypnopompic hallucinations: NO  History suggestive of sleep talking:NO  History suggestive of sleep walking:NO  History suggestive of bruxism: NO    History suggestive of cataplexy: NO  History suggestive of sleep paralysis: NO     History regarding old sleep studies:  Prior history of sleep study: No.  Using CPAP device: No.  Currently using home Oxygen: NO.        Patient considerations:  Is the patient is ambulatory: Yes  Patient is currently using: None of these Wheelchair, Walker or [employer]. Para/Quadriplegic: NO  Hearing deficit : NO  Claustrophobic: NO  MDD : NO  Blind: NO  Incontinent: NO  Para/Quadraplegi: NO.   Need transportation to and from Sleep Center:NO     [place] Sleepiness Score:   Sitting and readin  Watching TV:0  Sitting inactive in a public place:1  Being a passenger in a motor vehicle for an hour or more:3  Lying down in the afternoon:3  Sitting and talking to someone:2  Sitting quietly after lunch (no alcohol): 2  Stopped for a few minutes in traffic while drivin  Total Score: 12 Mallampati Score: 3  Neck Circumference: 25.0 Inches    PMHx   Past Medical History      Diagnosis Date    Azoospermia     Hypogonadism       Past Surgical History        Procedure Laterality Date    CHOLECYSTECTOMY      stent placement as well. CT GUIDED CHEST TUBE  2/1/2023    CT GUIDED CHEST TUBE 2/1/2023 [facility] CT SCAN     Meds    Current Medications    alteplase (ACTIVASE) syringe  6 mg IntraPLEUral Once    And    dornase (PULMOZYME) syringe  5 mg IntraPLEUral Once    testosterone  2 patch Topical Q24H    sodium chloride flush  5-40 mL IntraVENous 2 times per day    [Held by provider] enoxaparin  30 mg SubCUTAneous Q12H    cefTRIAXone (ROCEPHIN) IV  1,000 mg IntraVENous Q24H     HYDROcodone 5 mg - acetaminophen, ibuprofen, sodium chloride flush, sodium chloride, ondansetron **OR** ondansetron, polyethylene glycol, acetaminophen **OR** acetaminophen, ipratropium-albuterol  IV Drips/Infusions   sodium chloride       Home Medications  Medications Prior to Admission: levoFLOXacin (LEVAQUIN) 750 MG tablet, Take 1 tablet by mouth daily for 7 days (Patient not taking: Reported on 1/31/2023)  ANDRODERM 4 MG/24HR PT24, Place 8 mg onto the skin daily for 30 days. Two patches daily. Fexofenadine HCl (ALLEGRA PO), Take by mouth daily (Patient not taking: Reported on 1/30/2023)  ibuprofen (ADVIL;MOTRIN) 200 MG tablet, Take 200 mg by mouth every 6 hours as needed for Pain (Patient not taking: Reported on 1/30/2023)  Diet    ADULT DIET; Regular  Allergies    Patient has no known allergies.   Social History     Social History     Socioeconomic History    Marital status:      Spouse name: Alicja Felix    Number of children: 6    Years of education: Not on file    Highest education level: Not on file   Occupational History    Not on file   Tobacco Use    Smoking status: Never    Smokeless tobacco: Never   Vaping Use    Vaping Use: Never used   Substance and Sexual Activity    Alcohol use: No    Drug use: No    Sexual activity: Not on file   Other Topics Concern    Not on file   Social History Narrative    Not on file     Social Determinants of Health     Financial Resource Strain: Low Risk     Difficulty of Paying Living Expenses: Not hard at all   Food Insecurity: No Food Insecurity    Worried About [address] in the Last Year: Never true    [address] in the Last Year: Never true   Transportation Needs: No Transportation Needs    Lack of Transportation (Medical): No    Lack of Transportation (Non-Medical): No   Physical Activity: Not on file   Stress: Not on file   Social Connections: Not on file   Intimate Partner Violence: Not on file   Housing Stability: Not on file     Family History          Problem Relation Age of Onset    Cancer Mother         hodgkins    No Known Problems Father      Sleep History    Never diagnosed with sleep apnea in the past.  Occupational history   Occupation:  He is current working: Yes  Type of profession: full time job doing working at [employer]. History of tobacco smoking:No  .  History of recreational or IV drug use in the past:NO     History of exposure to coal mines/coal dust: NO  History of exposure to foundry dust/welding: NO  History of exposure to quarry/silica/sandblasting: NO  History of exposure to asbestos/working with [email or web address]/ships: NO  History of exposure to farm dust: NO  History of recent travel to long distances: NO  History of exposure to birds, pigeons, or chickens in the past:NO  Pet animals at home:Yes, 1. History of pulmonary embolism in the past: No            History of DVT in the past:No        Vitals     height is 5' 10\" (1.778 m) and weight is 291 lb 11.2 oz (132.3 kg). His oral temperature is 99 °F (37.2 °C). His blood pressure is 123/74 and his pulse is 91. His respiration is 24 and oxygen saturation is 95%. Body mass index is 41.85 kg/m².     SUPPLEMENTAL O2: O2 Flow Rate (L/min): 2 L/min     I/O      Intake/Output Summary (Last 24 hours) at 2/2/2023 1026  Last data filed at 2/2/2023 1004  Gross per 24 hour   Intake 2230.42 ml   Output 1070 ml   Net 1160.42 ml     I/O last 3 completed shifts: In: 4034.2 [P.O.:1300; I.V.:2635.3; IV Piggyback:98.9]  Out: 1070 [Chest Tube:1070]   Patient Vitals for the past 96 hrs (Last 3 readings):   Weight   02/02/23 0109 291 lb 11.2 oz (132.3 kg)   02/01/23 0315 294 lb 8 oz (133.6 kg)   01/30/23 1747 297 lb (134.7 kg)       Exam   Nursing note and vitals reviewed. Physical Exam:  General appearance: Alert, not in acute distress. Nonlabored breathing. HEENT:  Normal cephalic, atraumatic without obvious deformity. Pupils equal, round, and reactive to light. Conjunctivae clear. Neck: Supple, with full range of motion. Respiratory:  Normal respiratory effort. Decreased breath sounds in right lower lung field. Cardiovascular: Normal rate, regular rhythm with normal S1/S2 without murmurs, rubs or gallops. Abdomen: Soft, non-tender, non-distended with normal bowel sounds. Skin: No rashes or lesions. Neurological exam reveals alert, oriented, normal speech, no focal findings  Exam of extremities: no pedal or leg edema, no cyanosis    Labs  - Old records and notes have been reviewed in [facility]   ABG  No results found for: PH, PO2, PCO2, HCO3, O2SAT  No results found for: IFIO2, MODE, SETTIDVOL, SETPEEP  CBC  Recent Labs     01/30/23  1602 01/30/23  1800 01/31/23  0453 02/01/23  0422 02/02/23  0559   WBC 21.3*   < > 25.9* 17.7* 12.0*   RBC 5.04   < > 4.93 4.63* 4.44*   HGB 13.9*   < > 13.5* 12.5* 12.0*   HCT 40.9*   < > 40.6* 38.8* 37.0*   MCV 81.2   < > 82.4 83.8 83.3   MCH 27.6   < > 27.4 27.0 27.0   MCHC 34.0   < > 33.3 32.2 32.4   RDW 12.7  --   --   --   --       < > 388 403* 431*   MPV 8.9*   < > 9.3* 9.1* 9.3*    < > = values in this interval not displayed.       BMP  Recent Labs     01/31/23  0453 02/01/23  0422 02/02/23  0559   * 136 140   K 3.8 4.6 4.4    98 102   CO2 22* 24 28   BUN 13 12 12   CREATININE 0.7 0.7 0.7   GLUCOSE 118* 108 98   CALCIUM 8.6 8.8 8.5     LFT  Recent Labs     01/30/23  1800   AST 12   ALT 20   BILITOT 1.5*   ALKPHOS 77   LIPASE 19.5     TROP  Lab Results   Component Value Date/Time    TROPONINT < 0.010 01/30/2023 06:00 PM     BNP  No results for input(s): BNP in the last 72 hours. Lactic Acid  Recent Labs     01/31/23  0453   LACTA 1.7     INR  Recent Labs     01/31/23  0900   INR 1.42*     PTT  No results for input(s): APTT in the last 72 hours. Glucose  No results for input(s): POCGLU in the last 72 hours. UA   Recent Labs     01/31/23  1452   [facility]   . PFTs   Not in the system    Sleep studies   None in the system    Cultures    COVID/FLU negative  Blood cultures x2 negative at 48 hours  Urine legionella antigen negative  Strep Pneumo antigen not yet collected  Respiratory culture, pending    Pleural Protein  5.7, Serum protein 6.5 = 0.88  Pleural  / Serum  = 5.6  Pleural fluid culture NGTD  Pleural fluid cytology, pending  EKG   1/30/23  Sinus tachycardia  Incomplete right bundle branch block  Borderline ECG  Echocardiogram   1/5/2019  Ejection fraction is visually estimated at 60%. Overall left ventricular function is normal.    Radiology    CXR      2/1/23 Chest X-ray: 1 view. Right chest tube. No pneumothorax. Small/moderate residual right pleural   effusion. Right airspace disease and atelectasis. 1/31/2023 PROCEDURE: XR CHEST 1 VIEW       CT Scans    CT scan of chest with IV contrast performed on 1 February 2023:  1. Moderate to large loculated right pleural effusion with a large area of consolidation involving the right middle lobe and right lower lobe. Circumferential right pleural thickening, right perihilar thickening, and prominent mediastinal and hilar lymph  nodes are associated. Differential considerations include airspace infection/inflammation however neoplasm is not excluded.    2. Chronic findings are discussed. Assessment   RML/RLL pneumonia  Right sided empyema w/ pleural thickening  S/p thoracentesis 1/31/23  s/p chest tube 2/1/23  Clinically improving w/ some residual pleuritic discomfort and reported exertional dyspnea, likely 2/2 to pna and deconditioning, w/ contribution from pleural thickening restriction. Good output. Concern for undiagnosed [place]  Plan   Patient amenable to [place] eval as outpatient  Cardiothoracic surgery consult by Dr. Dean Albarado MD appreciated  S/p Chest tube placement w/ plans for tpa and DNAse today  Serial daily imaging    Infectious disease consultation with Dr. Alton Wilkins MD appreciated  Continue abx management per ID    Questions and concerns addressed. Electronically signed by   Ernesto Madrid MD PGY-2 FM on 2/2/2023 at 10:26 AM     Addendum by Dr. Gómez Soria MD:  Patient seen by me independently including key components of medical care. Face to face evaluation and examination was performed. Case discussed with Eliezer Mcallister MD - Family medicine resident physician. Italicized font, if present,  represents changes to the note made by me. More than 50% of the encounter time involved with patient care by the Pulmonary & Critical care service team spent by me.     Please see my modifications mentioned below:  He is sitting in chair in no acute distress  Antibiotics per primary service    Electronically signed by   Ioana Baldwin MD on 2/2/2023 at 8:22 PM

## 2023-02-02 NOTE — FLOWSHEET NOTE
02/02/23 3494   Safe Environment   Safety Measures Other (comment)  (Vn safety round)   VN called into patients room and introduced myself and role. Patient answered and permitted video. Video activated. . Patient up in chair. Patient voiced no needs or concerns at this time. Call light within reach.

## 2023-02-02 NOTE — PROGRESS NOTES
[facility] ICU STEPDOWN TELEMETRY 4K  Occupational Therapy  Daily Note  Time:   Time In: 3047  Time Out: 1355  Timed Code Treatment Minutes: 24 Minutes  Minutes: 24          Date: 2023  Patient Name: Sari Ferrer,   Gender: male      Room: [facility]04/004-A  MRN: [id number]  : 1980  (40 y.o.)  Referring Practitioner: Becky Guerra MD  Diagnosis: Acute Pneumonia  Additional Pertinent Hx: The patient is a 40 y.o. male  who presents with SOB. Pt states he was treated in December for possible pneumonia. He took antibiotics and felt better for a few weeks. In mid January he went to ED for cough SOB and fever and workup was negative. He returned to his PCP yesterday for same symptoms, cough, fever, congestion, and was given IM Rocephin and Levaquin, upon review of CXR he was told to go to ED for further workup. In the emergency department he was saturating 89 to 90% on room air and was placed on 2 L supplemental oxygen. CXR-   Large right pleural effusion and suspected underlying airspace infiltrates. \"    Restrictions/Precautions:  Restrictions/Precautions: General Precautions, Fall Risk     SUBJECTIVE: Pt laying in bed upon arrival, pt agreeable to OT session, RN gave verbal approval for session, RN gave verbal approval to get OOB     PAIN: 0/10:     Vitals: Oxygen: 94-98% on RA during functional actt  Heart Rate: 111-128 with activity    COGNITION: WFL    ADL:   No ADL's completed this session. Rafael Montero BALANCE:  Standing Balance: Stand By Assistance. Due to SOB and increased difficulty breathing    BED MOBILITY:  Not Tested    TRANSFERS:  Sit to Stand:  Stand By Assistance. From the recliner  Stand to Sit: Stand By Assistance. To the recliner    FUNCTIONAL MOBILITY:  Assistive Device: None  Assist Level:  Stand By Assistance.    Distance:  HH distances with 2 standing rest breaks due to SOB, with pt given vc's for PLB and deep breathing and able to demonstrate      ASSESSMENT: Activity Tolerance:  Patient tolerance of  treatment: good. Discharge Recommendations: Home with Home Health OT  Equipment Recommendations: Equipment Needed: No  Plan: Times Per Week: 2-3x  Times Per Day: Once a day  Current Treatment Recommendations: Balance training, Strengthening, Functional mobility training, Endurance training, Equipment evaluation, education, & procurement, Self-Care / ADL, Safety education & training, Patient/Caregiver education & training, Home management training    Patient Education  Patient Education: ADL's    Goals  Short Term Goals  Time Frame for Short Term Goals: Until discharge  Short Term Goal 1: Pt will complete BADL routine with EC techniques Indep to increase endurance in home environment. Short Term Goal 2: Pt will complete mobility to/from BR and HH distances with Indep while O2 sats remain above 90% to increase indep and endurance with all toileting. Additional Goals?: No    Following session, patient left in safe position with all fall risk precautions in place.

## 2023-02-02 NOTE — PROGRESS NOTES
CT/CV Surgery Progress Note    2023 7:57 AM  Surgeon:  Dr. Beryl Aaron     Subjective:  Mr. Cassy Escamilla is resting comfortably in bed, alert, and in no acute distress. Pt denies chest pressure, SOB, fever,chills, N/V/D. Chest tube with 1070 past 24 hours. Vital Signs: /65   Pulse 84   Temp 98.4 °F (36.9 °C) (Oral)   Resp 20   Ht 5' 10\" (1.778 m)   Wt 291 lb 11.2 oz (132.3 kg)   SpO2 95%   BMI 41.85 kg/m²    Temp (24hrs), Av.5 °F (36.9 °C), Min:97.6 °F (36.4 °C), Max:99.3 °F (37.4 °C)       Labs:   CBC:  Recent Labs     23  0900 23  0422 23  0559   WBC 25.9*  --  17.7* 12.0*   HGB 13.5*  --  12.5* 12.0*   HCT 40.6*  --  38.8* 37.0*   MCV 82.4  --  83.8 83.3     --  403* 431*   INR  --  1.42*  --   --      BMP:   Recent Labs     23  0422 23  0559   * 136 140   K 3.8 4.6 4.4    98 102   CO2 22* 24 28   BUN 13 12 12   CREATININE 0.7 0.7 0.7     Last HgA1C: No results found for: LABA1C    Imaging:  CXR: 2023  Findings:   Interval placement of right chest pigtail catheter. No pneumothorax. Small/moderate residual right pleural effusion. Airspace opacities and    atelectasis. Left lung is well aerated. No significant left pleural effusion. The cardiac silhouette is normal in size. Bony thorax is grossly intact. Impression   Impression:   Right chest tube. No pneumothorax. Small/moderate residual right pleural    effusion. Right airspace disease and atelectasis.        Intake/Output Summary (Last 24 hours) at 2023 4330  Last data filed at 2023 0319  Gross per 24 hour   Intake 1066.87 ml   Output 1070 ml   Net -3.13 ml       Scheduled Meds:    testosterone  2 patch Topical Q24H    sodium chloride flush  5-40 mL IntraVENous 2 times per day    [Held by provider] enoxaparin  30 mg SubCUTAneous Q12H    cefTRIAXone (ROCEPHIN) IV  1,000 mg IntraVENous Q24H       ROS: All neg unless specifically mentioned in subjective section. Exam:  General Appearance: alert ,conversing, in no acute distress  Cardiovascular: normal rate, regular rhythm, normal S1 and S2, no murmurs, rubs, clicks, or gallops  Pulmonary/Chest: decreased BS in right base  Neurological: alert, oriented, normal speech, no focal findings or movement disorder noted    Assessment:   Patient Active Problem List   Diagnosis    Hypogonadotropic hypogonadism syndrome, male ([address]    Hypogonadism in male    Mixed hyperlipidemia    Pneumonia due to organism    Acute pneumonia    Pleural effusion, right    Abnormal chest x-ray     Plan:   CXR reviewed- Continue daily CXR's   TPA/Dornase today    The plan of care was discussed in detail with Dr. Kiko Cid PA-C      Hoping that his empyema is early enough that it will respond to tPA and dornase. Chest tube was placed yesterday and first dose administered today. Chest x-ray shows some improvement. However CAT scan shows fluid extends towards the apex. Plan is to continue tPA and dornase through the weekend and reevaluate on Monday. Repeat CAT scan on Monday. If lung continues to be entrapped and there is significant pleural effusion/empyema present: Would consider decortication.

## 2023-02-02 NOTE — PROGRESS NOTES
IM Progress Note  Dr. Eunice Samuel  2/2/2023 9:40 AM      Patient name Ida Curiel  [id number]  PCP: Carmelina Herman MD  Admit Date: 1/30/2023  Acct No. [de-identified]    Subjective: Interval History:   Pt up in chair  Pain at Chest tube site tolerable      Diet: ADULT DIET; Regular    I/O last 3 completed shifts: In: 4034.2 [P.O.:1300; I.V.:2635.3; IV Piggyback:98.9]  Out: 1070 [Chest Tube:1070]  I/O this shift:  In: 1078.1 [I.V.:1028.1; IV Piggyback:50]  Out: -         Admission weight: 297 lb (134.7 kg) as of 1/30/2023  5:45 PM  Wt Readings from Last 3 Encounters:   02/02/23 291 lb 11.2 oz (132.3 kg)   01/30/23 299 lb (135.6 kg)   08/03/22 289 lb (131.1 kg)     Body mass index is 41.85 kg/m². ROS   CVS;  no cp or palpitation  Resp: breathing not worse but still SOB  Neuro:  No numbness or weakness or dizziness  Abd: no nausea or vomiting or abd pain      Medications:   Scheduled Meds:   alteplase (ACTIVASE) syringe  6 mg IntraPLEUral Once    And    dornase (PULMOZYME) syringe  5 mg IntraPLEUral Once    testosterone  2 patch Topical Q24H    sodium chloride flush  5-40 mL IntraVENous 2 times per day    [Held by provider] enoxaparin  30 mg SubCUTAneous Q12H    cefTRIAXone (ROCEPHIN) IV  1,000 mg IntraVENous Q24H     Continuous Infusions:   sodium chloride         Labs :     CBC:   Recent Labs     01/31/23 0453 02/01/23  0422 02/02/23  0559   WBC 25.9* 17.7* 12.0*   HGB 13.5* 12.5* 12.0*    403* 431*     BMP:    Recent Labs     01/31/23 0453 02/01/23  0422 02/02/23  0559   * 136 140   K 3.8 4.6 4.4    98 102   CO2 22* 24 28   BUN 13 12 12   CREATININE 0.7 0.7 0.7   GLUCOSE 118* 108 98     Hepatic:   Recent Labs     01/30/23  1800   AST 12   ALT 20   BILITOT 1.5*   ALKPHOS 77     Troponin: No results for input(s): TROPONINI in the last 72 hours. BNP: No results for input(s): BNP in the last 72 hours. Lipids: No results for input(s): CHOL, HDL in the last 72 hours.     Invalid input(s): LDLCALCU  INR:   Recent Labs     01/31/23  0900   INR 1.42*       Radiology    Objective:   Vitals: /74   Pulse 91   Temp 99 °F (37.2 °C) (Oral)   Resp 24   Ht 5' 10\" (1.778 m)   Wt 291 lb 11.2 oz (132.3 kg)   SpO2 95%   BMI 41.85 kg/m²   HEENT: Head:pupils react  Neck: supple  Lungs: diminished rt base, CT in place  Heart: regular rate and rhythm   Abdomen: soft BS heard   Extremities: warm  no edema  Neurologic:  Alert, oriented X3    Impression:   :   S/p CT for Empyema   Leucocytosis improved  Sinus tachycardia  Hypogonadism  Excessive obesity    Plan:    Cont antibiotics per ID and await final cult results  Plans are for TPA intra pleural today  Stop IV  fluids as HR improved  If ok with CVS resume lovenox   Ambulate as tolerated   F/u CBC    Bakari Morris MD, MD

## 2023-02-02 NOTE — PROGRESS NOTES
Progress note: Infectious diseases    Patient - Kel Jean,  Age - 40 y.o.    - 1980      Room Number - 4K-04/004-A   MRN -  [id number]   Acct # - [de-identified]  Date of Admission -  2023  5:45 PM    SUBJECTIVE:   He feels better. OBJECTIVE   VITALS    height is 5' 10\" (1.778 m) and weight is 291 lb 11.2 oz (132.3 kg). His oral temperature is 97.5 °F (36.4 °C). His blood pressure is 120/67 and his pulse is 95. His respiration is 22 and oxygen saturation is 97%. Wt Readings from Last 3 Encounters:   23 291 lb 11.2 oz (132.3 kg)   23 299 lb (135.6 kg)   22 289 lb (131.1 kg)       I/O (24 Hours)    Intake/Output Summary (Last 24 hours) at 2023 1232  Last data filed at 2023 1004  Gross per 24 hour   Intake 2830.42 ml   Output 1070 ml   Net 1760.42 ml         General Appearance  Awake, alert, oriented,  obese.   HEENT - normocephalic, atraumatic, pale  conjunctiva,  anicteric sclera  Neck - Supple, no mass  Lungs -  Bilateral   air entry, right chest tube  Cardiovascular - Heart sounds are normal.     Abdomen - soft, not distended, nontender,   Neurologic -oriented  Skin - No bruising or bleeding  Extremities - No edema, no cyanosis, clubbing     MEDICATIONS:      testosterone  2 patch Topical Q24H    sodium chloride flush  5-40 mL IntraVENous 2 times per day    [Held by provider] enoxaparin  30 mg SubCUTAneous Q12H    cefTRIAXone (ROCEPHIN) IV  1,000 mg IntraVENous Q24H      sodium chloride       HYDROcodone 5 mg - acetaminophen, ibuprofen, sodium chloride flush, sodium chloride, ondansetron **OR** ondansetron, polyethylene glycol, acetaminophen **OR** acetaminophen, ipratropium-albuterol      LABS:     CBC:   Recent Labs     23  0453 23  0422 23  0559   WBC 25.9* 17.7* 12.0*   HGB 13.5* 12.5* 12.0*    403* 431*       BMP:    Recent Labs     23  0457 02/01/23  0422 02/02/23  0559   * 136 140   K 3.8 4.6 4.4    98 102   CO2 22* 24 28   BUN 13 12 12   CREATININE 0.7 0.7 0.7   GLUCOSE 118* 108 98       Calcium:  Recent Labs     02/02/23  0559   CALCIUM 8.5        INR:   Recent Labs     01/31/23  0900   INR 1.42*       Hepatic:   Recent Labs     01/30/23  1800 01/31/23  0900   ALKPHOS 77  --    ALT 20  --    AST 12  --    PROT 7.9 6.5   BILITOT 1.5*  --    BILIDIR 0.5*  --    LABALBU 4.1 3.8       Amylase and Lipase:  Recent Labs     01/31/23  0453   LACTA 1.7       Lactic Acid:   Recent Labs     01/31/23  0453   LACTA 1.7          CULTURES:   UA:   Recent Labs     01/31/23  1452   COLORU YELLOW       Micro:   Lab Results   Component Value Date/Time    BC No growth 24 hours. No growth 48 hours.  01/31/2023 04:53 AM        Problem list of patient:     Patient Active Problem List   Diagnosis Code    Hypogonadotropic hypogonadism syndrome, male ([facility] [email or web address] E23.0    Hypogonadism in male E29.1    Mixed hyperlipidemia E78.2    Pneumonia due to organism J18.9    Acute pneumonia J18.9    Pleural effusion, right J90    Abnormal chest x-ray R93.89         ASSESSMENT/PLAN   Pneumonia with empyema: he has right side chest tube getting alteplase and dornase  Obesity   Continue iv antibiotics      Aguila Leung MD, MD, FACP 2/2/2023 12:32 PM

## 2023-02-03 ENCOUNTER — APPOINTMENT (OUTPATIENT)
Dept: GENERAL RADIOLOGY | Age: 43
DRG: 193 | End: 2023-02-03
Payer: COMMERCIAL

## 2023-02-03 LAB
BASOPHILS ABSOLUTE: 0.1 THOU/MM3 (ref 0–0.1)
BASOPHILS NFR BLD AUTO: 0.7 %
DEPRECATED RDW RBC AUTO: 41.7 FL (ref 35–45)
EOSINOPHIL NFR BLD AUTO: 2.7 %
EOSINOPHILS ABSOLUTE: 0.3 THOU/MM3 (ref 0–0.4)
ERYTHROCYTE [DISTWIDTH] IN BLOOD BY AUTOMATED COUNT: 13.8 % (ref 11.5–14.5)
HCT VFR BLD AUTO: 37.7 % (ref 42–52)
HGB BLD-MCNC: 12.3 GM/DL (ref 14–18)
IMM GRANULOCYTES # BLD AUTO: 0.23 THOU/MM3 (ref 0–0.07)
IMM GRANULOCYTES NFR BLD AUTO: 2.2 %
LYMPHOCYTES ABSOLUTE: 1.5 THOU/MM3 (ref 1–4.8)
LYMPHOCYTES NFR BLD AUTO: 14.4 %
MCH RBC QN AUTO: 27.2 PG (ref 26–33)
MCHC RBC AUTO-ENTMCNC: 32.6 GM/DL (ref 32.2–35.5)
MCV RBC AUTO: 83.2 FL (ref 80–94)
MONOCYTES ABSOLUTE: 1.2 THOU/MM3 (ref 0.4–1.3)
MONOCYTES NFR BLD AUTO: 11.1 %
NEUTROPHILS NFR BLD AUTO: 68.9 %
NRBC BLD AUTO-RTO: 0 /100 WBC
PLATELET # BLD AUTO: 471 THOU/MM3 (ref 130–400)
PMV BLD AUTO: 9 FL (ref 9.4–12.4)
RBC # BLD AUTO: 4.53 MILL/MM3 (ref 4.7–6.1)
SEGMENTED NEUTROPHILS ABSOLUTE COUNT: 7.4 THOU/MM3 (ref 1.8–7.7)
WBC # BLD AUTO: 10.7 THOU/MM3 (ref 4.8–10.8)

## 2023-02-03 PROCEDURE — 2580000003 HC RX 258: Performed by: INTERNAL MEDICINE

## 2023-02-03 PROCEDURE — 97530 THERAPEUTIC ACTIVITIES: CPT

## 2023-02-03 PROCEDURE — 6360000002 HC RX W HCPCS: Performed by: PHYSICIAN ASSISTANT

## 2023-02-03 PROCEDURE — 32562 LYSE CHEST FIBRIN SUBQ DAY: CPT | Performed by: PHYSICIAN ASSISTANT

## 2023-02-03 PROCEDURE — 2580000003 HC RX 258: Performed by: PHYSICIAN ASSISTANT

## 2023-02-03 PROCEDURE — 6360000002 HC RX W HCPCS: Performed by: INTERNAL MEDICINE

## 2023-02-03 PROCEDURE — 2060000000 HC ICU INTERMEDIATE R&B

## 2023-02-03 PROCEDURE — 97110 THERAPEUTIC EXERCISES: CPT

## 2023-02-03 PROCEDURE — 85025 COMPLETE CBC W/AUTO DIFF WBC: CPT

## 2023-02-03 PROCEDURE — APPSS30 APP SPLIT SHARED TIME 16-30 MINUTES: Performed by: PHYSICIAN ASSISTANT

## 2023-02-03 PROCEDURE — 71045 X-RAY EXAM CHEST 1 VIEW: CPT

## 2023-02-03 PROCEDURE — 36415 COLL VENOUS BLD VENIPUNCTURE: CPT

## 2023-02-03 PROCEDURE — 99232 SBSQ HOSP IP/OBS MODERATE 35: CPT | Performed by: INTERNAL MEDICINE

## 2023-02-03 PROCEDURE — 6370000000 HC RX 637 (ALT 250 FOR IP): Performed by: INTERNAL MEDICINE

## 2023-02-03 RX ADMIN — DORNASE ALFA 5 MG: 1 SOLUTION RESPIRATORY (INHALATION) at 08:59

## 2023-02-03 RX ADMIN — SODIUM CHLORIDE, PRESERVATIVE FREE 10 ML: 5 INJECTION INTRAVENOUS at 20:27

## 2023-02-03 RX ADMIN — SODIUM CHLORIDE, PRESERVATIVE FREE 10 ML: 5 INJECTION INTRAVENOUS at 08:07

## 2023-02-03 RX ADMIN — IBUPROFEN 200 MG: 200 TABLET, FILM COATED ORAL at 18:33

## 2023-02-03 RX ADMIN — ACETAMINOPHEN 650 MG: 325 TABLET ORAL at 02:44

## 2023-02-03 RX ADMIN — ENOXAPARIN SODIUM 30 MG: 100 INJECTION SUBCUTANEOUS at 02:45

## 2023-02-03 RX ADMIN — ALTEPLASE 6 MG: 2.2 INJECTION, POWDER, LYOPHILIZED, FOR SOLUTION INTRAVENOUS at 08:59

## 2023-02-03 RX ADMIN — ENOXAPARIN SODIUM 30 MG: 100 INJECTION SUBCUTANEOUS at 16:10

## 2023-02-03 RX ADMIN — CEFTRIAXONE SODIUM 1000 MG: 1 INJECTION, POWDER, FOR SOLUTION INTRAMUSCULAR; INTRAVENOUS at 03:45

## 2023-02-03 RX ADMIN — IBUPROFEN 200 MG: 200 TABLET, FILM COATED ORAL at 12:05

## 2023-02-03 RX ADMIN — ACETAMINOPHEN 650 MG: 325 TABLET ORAL at 08:56

## 2023-02-03 RX ADMIN — HYDROCODONE BITARTRATE AND ACETAMINOPHEN 1 TABLET: 5; 325 TABLET ORAL at 12:32

## 2023-02-03 RX ADMIN — IBUPROFEN 200 MG: 200 TABLET, FILM COATED ORAL at 00:22

## 2023-02-03 RX ADMIN — ACETAMINOPHEN 650 MG: 325 TABLET ORAL at 20:25

## 2023-02-03 RX ADMIN — IBUPROFEN 200 MG: 200 TABLET, FILM COATED ORAL at 06:21

## 2023-02-03 ASSESSMENT — PAIN SCALES - GENERAL
PAINLEVEL_OUTOF10: 8
PAINLEVEL_OUTOF10: 3
PAINLEVEL_OUTOF10: 4
PAINLEVEL_OUTOF10: 0
PAINLEVEL_OUTOF10: 3
PAINLEVEL_OUTOF10: 6
PAINLEVEL_OUTOF10: 3
PAINLEVEL_OUTOF10: 2
PAINLEVEL_OUTOF10: 5
PAINLEVEL_OUTOF10: 3
PAINLEVEL_OUTOF10: 3

## 2023-02-03 ASSESSMENT — PAIN DESCRIPTION - LOCATION
LOCATION: BACK
LOCATION: ABDOMEN
LOCATION: CHEST
LOCATION: CHEST
LOCATION: BACK
LOCATION: CHEST

## 2023-02-03 ASSESSMENT — PAIN DESCRIPTION - FREQUENCY
FREQUENCY: INTERMITTENT
FREQUENCY: CONTINUOUS
FREQUENCY: INTERMITTENT
FREQUENCY: INTERMITTENT

## 2023-02-03 ASSESSMENT — PAIN DESCRIPTION - DESCRIPTORS
DESCRIPTORS: SHARP;SHOOTING;STABBING
DESCRIPTORS: ACHING;CRAMPING;DISCOMFORT
DESCRIPTORS: SHARP
DESCRIPTORS: SHARP
DESCRIPTORS: ACHING;DISCOMFORT
DESCRIPTORS: SHARP;DISCOMFORT
DESCRIPTORS: SHARP
DESCRIPTORS: DULL

## 2023-02-03 ASSESSMENT — PAIN DESCRIPTION - DIRECTION
RADIATING_TOWARDS: BACK
RADIATING_TOWARDS: BACK

## 2023-02-03 ASSESSMENT — PAIN - FUNCTIONAL ASSESSMENT
PAIN_FUNCTIONAL_ASSESSMENT: PREVENTS OR INTERFERES SOME ACTIVE ACTIVITIES AND ADLS
PAIN_FUNCTIONAL_ASSESSMENT: ACTIVITIES ARE NOT PREVENTED
PAIN_FUNCTIONAL_ASSESSMENT: PREVENTS OR INTERFERES SOME ACTIVE ACTIVITIES AND ADLS
PAIN_FUNCTIONAL_ASSESSMENT: PREVENTS OR INTERFERES WITH ALL ACTIVE AND SOME PASSIVE ACTIVITIES
PAIN_FUNCTIONAL_ASSESSMENT: PREVENTS OR INTERFERES SOME ACTIVE ACTIVITIES AND ADLS
PAIN_FUNCTIONAL_ASSESSMENT: PREVENTS OR INTERFERES WITH MANY ACTIVE NOT PASSIVE ACTIVITIES

## 2023-02-03 ASSESSMENT — PAIN SCALES - WONG BAKER: WONGBAKER_NUMERICALRESPONSE: 0

## 2023-02-03 ASSESSMENT — PAIN DESCRIPTION - PAIN TYPE
TYPE: ACUTE PAIN

## 2023-02-03 ASSESSMENT — PAIN DESCRIPTION - ORIENTATION
ORIENTATION: RIGHT

## 2023-02-03 ASSESSMENT — PAIN DESCRIPTION - ONSET
ONSET: ON-GOING

## 2023-02-03 NOTE — PROGRESS NOTES
Progress note: Infectious diseases    Patient - Vanesa Rodriguez,  Age - 40 y.o.    - 1980      Room Number - 4K-04/004-A   MRN -  [id number]   Acct # - [de-identified]  Date of Admission -  2023  5:45 PM    SUBJECTIVE:   He has right side chest pain after the thrombolytics  OBJECTIVE   VITALS    height is 5' 10\" (1.778 m) and weight is 287 lb 4.8 oz (130.3 kg). His oral temperature is 97.6 °F (36.4 °C). His blood pressure is 136/68 and his pulse is 91. His respiration is 22 and oxygen saturation is 95%. Wt Readings from Last 3 Encounters:   23 287 lb 4.8 oz (130.3 kg)   23 299 lb (135.6 kg)   22 289 lb (131.1 kg)       I/O (24 Hours)    Intake/Output Summary (Last 24 hours) at 2/3/2023 1005  Last data filed at 2/3/2023 0253  Gross per 24 hour   Intake 2400 ml   Output 1180 ml   Net 1220 ml         General Appearance  Awake, alert, oriented,  obese. HEENT - normocephalic, atraumatic, pale  conjunctiva,  anicteric sclera.   Neck - Supple, no mass  Lungs -  Bilateral   air entry, right chest tube  Cardiovascular - Heart sounds are normal.     Abdomen - soft, not distended, nontender,   Neurologic -oriented  Skin - No bruising or bleeding  Extremities - No edema, no cyanosis, clubbing     MEDICATIONS:      testosterone  2 patch Topical Q24H    sodium chloride flush  5-40 mL IntraVENous 2 times per day    enoxaparin  30 mg SubCUTAneous Q12H    cefTRIAXone (ROCEPHIN) IV  1,000 mg IntraVENous Q24H      sodium chloride       HYDROcodone 5 mg - acetaminophen, ibuprofen, sodium chloride flush, sodium chloride, ondansetron **OR** ondansetron, polyethylene glycol, acetaminophen **OR** acetaminophen, ipratropium-albuterol      LABS:     CBC:   Recent Labs     23  0422 23  0559 23  0343   WBC 17.7* 12.0* 10.7   HGB 12.5* 12.0* 12.3*   * 431* 471*       BMP:    Recent Labs 02/01/23  0422 02/02/23  0559    140   K 4.6 4.4   CL 98 102   CO2 24 28   BUN 12 12   CREATININE 0.7 0.7   GLUCOSE 108 98       Calcium:  Recent Labs     02/02/23  0559   CALCIUM 8.5         CULTURES:   UA:   Recent Labs     01/31/23  1452   COLORU YELLOW       Micro:   Lab Results   Component Value Date/Time    BC No growth 24 hours. No growth 48 hours. 01/31/2023 04:53 AM        Problem list of patient:     Patient Active Problem List   Diagnosis Code    Hypogonadotropic hypogonadism syndrome, male ([facility] [address] E23.0    Hypogonadism in male E29.1    Mixed hyperlipidemia E78.2    Pneumonia due to organism J18.9    Acute pneumonia J18.9    Pleural effusion, right J90    Abnormal chest x-ray R93.89         ASSESSMENT/PLAN   Pneumonia with empyema: he has right side chest tube getting alteplase and dornase  Obesity  Continue current iv antibiotics  CT scan on Monday.          Enmanuel Hill MD, MD, FACP 2/3/2023 10:05 AM

## 2023-02-03 NOTE — PROGRESS NOTES
[facility]      Patient - Marry Gregorio   MRN -  [id number]   [facility]   - 1980      Date of Admission -  2023  5:45 PM  Date of evaluation -  2/3/2023  Room - --A   Hospital Day - 4  Consulting - Brendan Young MD Primary Care Physician - Kelly Moreno MD     Problem List      Active Hospital Problems    Diagnosis Date Noted    Pleural effusion, right [J90] 2023     Priority: Medium    Abnormal chest x-ray [R93.89] 2023     Priority: Medium    Acute pneumonia [J18.9] 2023     Priority: Medium     Reason for Consult    Large right pleural effusion    Past 24 hrs   Seen this AM. Patient laying in bed w/ chest tube clamped due to having received 2nd dose of tpa/dornase. Reports breathing is stable - still mild exertional dyspnea if ambulating but no dyspnea at rest. Mild discomfort at chest tube site not worse than yesterday. 1180 ml dark yellow output in past 24. Discussed with patient process for sleep study. Patient is agreeable to be scheduled for split night study for outpatient. HPI   History Obtained From: Patient, nursing staff, and electronic medical record. Marry Gregorio is a 40 y.o. 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 Per patient, patient started feeling sick on  with concurrent shortness of breath along with fevers at home. Also had right sided constant chest pain that was non exertional and constant. The next day, he had a dry cough. He was seen at outpatient primary clinic for the symptoms on . Chest x-ray at the time showed a right-sided large effusion.   He was given one-time dose of IM Rocephin and 7 days of Levaquin. However, when chest x-ray results came back, he was sent to the ED due to concerns for a parapneumonic effusion. Of note, patient was diagnosed with and treated as outpatient in mid December at [facility] for a right-sided middle lobe pneumonia diagnosed on CTA chest.  He also reports a history of a right-sided pneumonia in 2019 that required inpatient stay for antibiotics. Otherwise, patient is not on O2 at home, has no smoking history, or history of aspiration. He was tachypneic to 40s on presentation. SP02 90% on RA. Tachy to 130s. BP wnl. Had labored breathing and was placed on o2. WBC 24.1. Procal 1.41. Bmp, Lactate nml. Bedside u/s ED shows possible loculations. Got 1 L NS bolus and was admitted to step down. Covid 19/influenza negative. Currently, on 6 L of o2 satting 94%. Afebrile. On azithromycin and ctx. 2x blood cultures pending. Expectorated sputum culture/gram stain, urine legionella and strep pneumo antigen pending. Received 1x 30 mg subq lovenox at 1am on 1/31. He is having shortness of breath: Yes  Onset: gradual, worsening   Duration:4 days. Diurnal variation:  None. Functional status prior to beginning of symptoms: 8-10 block/s on level ground. Current functional capacity on level ground: 0 block/s on level ground. He can climb steps: Yes, before. Not currently. Flights of steps he can climb: 5  He denies orthopnea. He denies paroxysmal nocturnal dyspnea. He is having cough: Yes  Duration of cough: for 3 days. His cough is associated with sputum production: No.  Hemoptysis:No  Diurnal variation: None. Relieving factors: No  Aggravating factors: No        He is having chest pain:Yes  Duration:Days:4  Onset:gradual, unchanged. Location:predominantly on right chest  Nature/Quality:dull  Assessment:constant, worsened by laying down and improved with sitting up.   Radiation:radiating to right shoulder  Scale:5/10  Relation ship to breathing: not significantly changed with inspiration. Sleep medicine HPI/Evaluation:     Usual time to go to bed during the work/regular day of week: 10-11pm  Usual time to wake up during the work//regular day of week: 7-8am     Sleep apnea symptoms:  Noticed to have loud snoring:Yes. Noted by his family member- spouse  Witnessed apneas during sleep noticed: No.   History of choking and gasping sensation at night time: No.   History of headaches in the morning:No.   History of dry mouth in the morning: No.   History of palpitations during night time/nocturnal awakenings: No.   History of sweating during night time/nocturnal awakenings: No.       General:  History of head injury in the past: No.   History of seizures: No  Rest less legs syndrome symptoms:NO  History suggestive of periodic limb movements during sleep: NO  History suggestive of hypnagogic hallucinations: NO  History suggestive of hypnopompic hallucinations: NO  History suggestive of sleep talking:NO  History suggestive of sleep walking:NO  History suggestive of bruxism: NO    History suggestive of cataplexy: NO  History suggestive of sleep paralysis: NO     History regarding old sleep studies:  Prior history of sleep study: No.  Using CPAP device: No.  Currently using home Oxygen: NO.        Patient considerations:  Is the patient is ambulatory: Yes  Patient is currently using: None of these Wheelchair, Walker or [employer]. Para/Quadriplegic: NO  Hearing deficit : NO  Claustrophobic: NO  MDD : NO  Blind: NO  Incontinent: NO  Para/Quadraplegi: NO.   Need transportation to and from Sleep Center:NO     [place] Sleepiness Score:   Sitting and readin  Watching TV:0  Sitting inactive in a public place:1  Being a passenger in a motor vehicle for an hour or more:3  Lying down in the afternoon:3  Sitting and talking to someone:2  Sitting quietly after lunch (no alcohol): 2  Stopped for a few minutes in traffic while drivin  Total Score: 12     Mallampati Score: 3  Neck Circumference: 25.0 Inches    PMHx   Past Medical History      Diagnosis Date    Azoospermia     Hypogonadism       Past Surgical History        Procedure Laterality Date    CHOLECYSTECTOMY      stent placement as well. CT GUIDED CHEST TUBE  2023    CT GUIDED CHEST TUBE 2023 [facility] CT SCAN     Meds    Current Medications    testosterone  2 patch Topical Q24H    sodium chloride flush  5-40 mL IntraVENous 2 times per day    enoxaparin  30 mg SubCUTAneous Q12H    cefTRIAXone (ROCEPHIN) IV  1,000 mg IntraVENous Q24H     HYDROcodone 5 mg - acetaminophen, ibuprofen, sodium chloride flush, sodium chloride, ondansetron **OR** ondansetron, polyethylene glycol, acetaminophen **OR** acetaminophen, ipratropium-albuterol  IV Drips/Infusions   sodium chloride       Home Medications  Medications Prior to Admission: levoFLOXacin (LEVAQUIN) 750 MG tablet, Take 1 tablet by mouth daily for 7 days (Patient not taking: Reported on 2023)  ANDRODERM 4 MG/24HR PT24, Place 8 mg onto the skin daily for 30 days. Two patches daily. Fexofenadine HCl (ALLEGRA PO), Take by mouth daily (Patient not taking: Reported on 2023)  ibuprofen (ADVIL;MOTRIN) 200 MG tablet, Take 200 mg by mouth every 6 hours as needed for Pain (Patient not taking: Reported on 2023)  Diet    ADULT DIET; Regular  Allergies    Patient has no known allergies.   Social History     Social History     Socioeconomic History    Marital status:      Spouse name: Fanta Beck    Number of children: 6    Years of education: Not on file    Highest education level: Not on file   Occupational History    Not on file   Tobacco Use    Smoking status: Never    Smokeless tobacco: Never   Vaping Use    Vaping Use: Never used   Substance and Sexual Activity    Alcohol use: No    Drug use: No    Sexual activity: Not on file   Other Topics Concern    Not on file   Social History Narrative    Not on file     Social Determinants of Health     Financial Resource Strain: Low Risk     Difficulty of Paying Living Expenses: Not hard at all   Food Insecurity: No Food Insecurity    Worried About [address] in the Last Year: Never true    Ran Out of Food in the Last Year: Never true   Transportation Needs: No Transportation Needs    Lack of Transportation (Medical): No    Lack of Transportation (Non-Medical): No   Physical Activity: Not on file   Stress: Not on file   Social Connections: Not on file   Intimate Partner Violence: Not on file   Housing Stability: Not on file     Family History          Problem Relation Age of Onset    Cancer Mother         hodgkins    No Known Problems Father      Sleep History    Never diagnosed with sleep apnea in the past.  Occupational history   Occupation:  He is current working: Yes  Type of profession: full time job doing working at [employer]. History of tobacco smoking:No  .  History of recreational or IV drug use in the past:NO     History of exposure to coal mines/coal dust: NO  History of exposure to foundry dust/welding: NO  History of exposure to quarry/silica/sandblasting: NO  History of exposure to asbestos/working with breaks/ships: NO  History of exposure to farm dust: NO  History of recent travel to long distances: NO  History of exposure to birds, pigeons, or chickens in the past:NO  Pet animals at home:Yes, 1. History of pulmonary embolism in the past: No            History of DVT in the past:No        Vitals     height is 5' 10\" (1.778 m) and weight is 287 lb 4.8 oz (130.3 kg). His oral temperature is 97.6 °F (36.4 °C). His blood pressure is 136/68 and his pulse is 91. His respiration is 22 and oxygen saturation is 95%. Body mass index is 41.22 kg/m².     SUPPLEMENTAL O2: O2 Flow Rate (L/min): 2 L/min     I/O      Intake/Output Summary (Last 24 hours) at 2/3/2023 0918  Last data filed at 2/3/2023 0253  Gross per 24 hour   Intake 3085.43 ml Output 1180 ml   Net 1905.43 ml     I/O last 3 completed shifts: In: 4563.6 [P.O.:3400; I.V.:1113.6; IV Piggyback:50]  Out: 7045 [Chest Tube:1490]   Patient Vitals for the past 96 hrs (Last 3 readings):   Weight   02/03/23 0615 287 lb 4.8 oz (130.3 kg)   02/02/23 0109 291 lb 11.2 oz (132.3 kg)   02/01/23 0315 294 lb 8 oz (133.6 kg)       Exam   Nursing note and vitals reviewed. Physical Exam:  General appearance: Alert, not in acute distress. No labored breathing. HEENT:  Normal cephalic, atraumatic without obvious deformity. Pupils equal, round, and reactive to light. Conjunctivae clear. Neck: Supple, with full range of motion. Respiratory:  Normal respiratory effort. Decreased breath sounds at right lung base. Cardiovascular: Normal rate, regular rhythm with normal S1/S2 without murmurs, rubs or gallops. Abdomen: Soft, non-tender, non-distended with normal bowel sounds. Musculoskeletal:  No clubbing, cyanosis or edema bilaterally. Full range of motion without deformity. Skin: No rashes or lesions. Neurological exam reveals alert, oriented, normal speech, no focal findings   Exam of extremities: peripheral pulses normal, no pedal or leg edema, no clubbing or cyanosis    Labs  - Old records and notes have been reviewed in [facility]   ABG  No results found for: PH, PO2, PCO2, HCO3, O2SAT  No results found for: IFIO2, MODE, SETTIDVOL, SETPEEP  CBC  Recent Labs     02/01/23 0422 02/02/23  0559 02/03/23  0343   WBC 17.7* 12.0* 10.7   RBC 4.63* 4.44* 4.53*   HGB 12.5* 12.0* 12.3*   HCT 38.8* 37.0* 37.7*   MCV 83.8 83.3 83.2   MCH 27.0 27.0 27.2   MCHC 32.2 32.4 32.6   * 431* 471*   MPV 9.1* 9.3* 9.0*      BMP  Recent Labs     02/01/23  0422 02/02/23  0559    140   K 4.6 4.4   CL 98 102   CO2 24 28   BUN 12 12   CREATININE 0.7 0.7   GLUCOSE 108 98   CALCIUM 8.8 8.5     LFT  No results for input(s): AST, ALT, ALB, BILITOT, ALKPHOS, LIPASE in the last 72 hours.     Invalid input(s): AMYLASE    TROP  Lab Results   Component Value Date/Time    TROPONINT < 0.010 01/30/2023 06:00 PM     BNP  No results for input(s): BNP in the last 72 hours. Lactic Acid  No results for input(s): LACTA in the last 72 hours. INR  No results for input(s): INR, PROTIME in the last 72 hours. PTT  No results for input(s): APTT in the last 72 hours. Glucose  No results for input(s): POCGLU in the last 72 hours. UA   Recent Labs     01/31/23  1452   31 [facility]   . PFTs   Not in the system    Sleep studies   None in the system    Cultures    COVID/FLU negative  Blood cultures x2 negative at 48 hours  Urine legionella antigen negative  Strep Pneumo antigen not yet collected  Respiratory culture (active, collected status)    Pleural Protein  5.7, Serum protein 6.5 = 0.88  Pleural  / Serum  = 5.6  Pleural fluid culture aerobic and anaerobic culture NGTD (updated 2/3/23)  Pleural fluid cytology - No maligant cells seen. EKG   1/30/23  Sinus tachycardia  Incomplete right bundle branch block  Borderline ECG  Echocardiogram   1/5/2019  Ejection fraction is visually estimated at 60%. Overall left ventricular function is normal.    Radiology    CXR  2/3/23 CXR 1 view  Findings: There is mild improvement of the right upper lobe lung aeration. Pigtail   drainage catheter is noted right mid to lower chest with no obvious change   in position. Loculated pleural fluid is likely in the right chest which has moderate volume of residual fluid despite the recent chest drain placement. The left lung is hypoventilated. Impression:  Minimal change from comparison, see above for details. 2/2/23 Chest X-ray: 1 view. CT Scans    CT scan of chest with IV contrast performed on 1 February 2023:  1. Moderate to large loculated right pleural effusion with a large area of consolidation involving the right middle lobe and right lower lobe.  Circumferential right pleural thickening, right perihilar thickening, and prominent mediastinal and hilar lymph  nodes are associated. Differential considerations include airspace infection/inflammation however neoplasm is not excluded. 2. Chronic findings are discussed. Assessment   RML/RLL pneumonia  Right sided empyema w/ pleural thickening  S/p thoracentesis 1/31/23  S/p chest tube 2/1/23  S/p tpa/dornase x 2    Concern for undiagnosed [place]  Plan   Cardiothoracic surgery consult by Dr. Elan Zhao MD appreciated  S/p Chest tube placement w/ 2x intrapleural tPA/Dornase. Plan for 3rd tomorrow. Serial daily imaging  Infectious disease consultation with Dr. Collette Hurst, MD appreciated  Continue abx management per ID    Pulm will schedule split night sleep study for outpatient w/ follow up at sleep medicine clinic/pulm clinic in 6-8 weeks after sleep study. Discussed with patient and questions answered. Pulmonology will sign off. Please don't hesistate to reach out if any questions. Thank you. Electronically signed by   Payam Shepherd MD PGY-2 FM on 2/3/2023 at 9:18 AM     Addendum by Dr. Ioana Siddiqui MD:  Patient seen by me independently including key components of medical care. Face to face evaluation and examination was performed. Case discussed with Jose Madden MD - Family medicine resident physician. Italicized font, if present,  represents changes to the note made by me. More than 50% of the encounter time involved with patient care by the Pulmonary & Critical care service team spent by me. Please see my modifications mentioned below:  He is on room air  Right-sided small bore chest tube in place  No air leak was noted in the atrium  Antibiotic management by Dr. Collette Hurst, MD from ID service  Continue to use incentive spirometer every 4 hourly as tolerated  Patient educated about my present plan. Cardiothoracic surgery service is managing the right-sided empyema and chest tube.   Patient to follow-up with the cardiothoracic surgery service in their clinic regarding patient's right-sided empyema and abnormal chest x-ray and CT scan of chest  -Will sign off on the case from pulmonary point of view. -Will follow PRN. -Call [phone] with questions.     Electronically signed by   Juana Rosario MD on 2/3/2023 at 5:34 PM

## 2023-02-03 NOTE — PROGRESS NOTES
CT/CV Surgery Progress Note    2/3/2023 7:42 AM  Surgeon:  Dr. Anita Cao     Subjective:  Mr. Natacha Larson is resting comfortably in bed, alert, and in no acute distress. Pt denies chest pressure, SOB, fever,chills, N/V/D. Chest tube with 1180 past 24 hours. Vital Signs: /72   Pulse 97   Temp 99.1 °F (37.3 °C) (Oral)   Resp 24   Ht 5' 10\" (1.778 m)   Wt 287 lb 4.8 oz (130.3 kg)   SpO2 96%   BMI 41.22 kg/m²    Temp (24hrs), Av.2 °F (36.8 °C), Min:97.5 °F (36.4 °C), Max:99.1 °F (37.3 °C)       Labs:   CBC:  Recent Labs     23  0900 23  0422 23  0559 23  0343   WBC  --  17.7* 12.0* 10.7   HGB  --  12.5* 12.0* 12.3*   HCT  --  38.8* 37.0* 37.7*   MCV  --  83.8 83.3 83.2   PLT  --  403* 431* 471*   INR 1.42*  --   --   --        BMP:   Recent Labs     23  0422 23  0559    140   K 4.6 4.4   CL 98 102   CO2 24 28   BUN 12 12   CREATININE 0.7 0.7       Imaging:  CXR: 2/3/2023  Findings: There is mild improvement of the right upper lobe lung aeration. Pigtail    drainage catheter is noted right mid to lower chest with no obvious change    in position. There is no pneumothorax. Loculated pleural fluid is likely    in the right chest which has moderate volume of residual fluid despite the    recent chest drain placement. The left lung is hypoventilated. Heart size    and pulmonary vasculature are at the upper limits of normal.           Impression   Impression:   Minimal change from comparison, see above for details. Intake/Output Summary (Last 24 hours) at 2/3/2023 0742  Last data filed at 2/3/2023 0253  Gross per 24 hour   Intake 4163.55 ml   Output 1180 ml   Net 2983.55 ml       Scheduled Meds:    testosterone  2 patch Topical Q24H    sodium chloride flush  5-40 mL IntraVENous 2 times per day    enoxaparin  30 mg SubCUTAneous Q12H    cefTRIAXone (ROCEPHIN) IV  1,000 mg IntraVENous Q24H     ROS: All neg unless specifically mentioned in subjective section. Exam:  General Appearance: alert ,conversing, in no acute distress  Cardiovascular: normal rate, regular rhythm, normal S1 and S2, no murmurs, rubs, clicks, or gallops  Pulmonary/Chest: decreased BS in right base  Neurological: alert, oriented, normal speech, no focal findings or movement disorder noted    Assessment:   Patient Active Problem List   Diagnosis    Hypogonadotropic hypogonadism syndrome, male ([facility] [address]    Hypogonadism in male    Mixed hyperlipidemia    Pneumonia due to organism    Acute pneumonia    Pleural effusion, right    Abnormal chest x-ray     Plan:   CXR reviewed- Continue daily CXR's   TPA/Dornase today again (2nd day)    The plan of care was discussed in detail with Dr. Noni Goltz, PANachoC no headache

## 2023-02-03 NOTE — PROGRESS NOTES
IM Progress Note  Dr. Filiberto Mcclain  2/3/2023 9:58 AM      Patient name Nel Wiley  [id number]  PCP: Michelle John MD  Admit Date: 1/30/2023  Acct No. [de-identified]    Subjective: Interval History:   Pt lying in bed  CT site pain not worse  Breathing unchanged      Diet: ADULT DIET; Regular    I/O last 3 completed shifts: In: 4563.6 [P.O.:3400; I.V.:1113.6; IV Piggyback:50]  Out: 1490 [Chest Tube:1490]  No intake/output data recorded. Admission weight: 297 lb (134.7 kg) as of 1/30/2023  5:45 PM  Wt Readings from Last 3 Encounters:   02/03/23 287 lb 4.8 oz (130.3 kg)   01/30/23 299 lb (135.6 kg)   08/03/22 289 lb (131.1 kg)     Body mass index is 41.22 kg/m². ROS   CVS;  no cp or palpitation  Resp: breathing not worse but still SOB  Neuro:  No numbness or weakness or dizziness  Abd: no nausea or vomiting or abd pain      Medications:   Scheduled Meds:   testosterone  2 patch Topical Q24H    sodium chloride flush  5-40 mL IntraVENous 2 times per day    enoxaparin  30 mg SubCUTAneous Q12H    cefTRIAXone (ROCEPHIN) IV  1,000 mg IntraVENous Q24H     Continuous Infusions:   sodium chloride         Labs :     CBC:   Recent Labs     02/01/23  0422 02/02/23  0559 02/03/23  0343   WBC 17.7* 12.0* 10.7   HGB 12.5* 12.0* 12.3*   * 431* 471*       BMP:    Recent Labs     02/01/23  0422 02/02/23  0559    140   K 4.6 4.4   CL 98 102   CO2 24 28   BUN 12 12   CREATININE 0.7 0.7   GLUCOSE 108 98       Hepatic:   No results for input(s): AST, ALT, ALB, BILITOT, ALKPHOS in the last 72 hours. Troponin: No results for input(s): TROPONINI in the last 72 hours. BNP: No results for input(s): BNP in the last 72 hours. Lipids: No results for input(s): CHOL, HDL in the last 72 hours. Invalid input(s): LDLCALCU  INR:   No results for input(s): INR in the last 72 hours.       Radiology    Objective:   Vitals: /68   Pulse 91   Temp 97.6 °F (36.4 °C) (Oral)   Resp 22   Ht 5' 10\" (1.778 m)   Wt 287 lb 4.8 oz (130.3 kg)   SpO2 95%   BMI 41.22 kg/m²   HEENT: Head:pupils react  Neck: supple  Lungs: diminished air entry on rt base, CT in place  Heart: regular rate and rhythm   Abdomen: soft BS heard   Extremities: warm  no edema  Neurologic:  Alert, oriented X3    Impression:   :   S/p CT for Empyema   Leucocytosis improved  Sinus tachycardia  Hypogonadism  Excessive obesity    Plan:    Cont antibiotics per ID  Plans are for repeat CT next week and based on results further intervention  Pt receiving TPA/Dornase   Lovenox restarted     Jeovany Krishnan MD, MD

## 2023-02-03 NOTE — PROGRESS NOTES
[facility] ICU STEPDOWN TELEMETRY 4K  Occupational Therapy  Daily Note  Time:   Time In: 1050  Time Out: 1114  Timed Code Treatment Minutes: 24 Minutes  Minutes: 24          Date: 2/3/2023  Patient Name: Frida Trevino,   Gender: male      Room: [facility]04/004-A  MRN: [id number]  : 1980  (40 y.o.)  Referring Practitioner: Stefany Morrissey MD  Diagnosis: Acute Pneumonia  Additional Pertinent Hx: The patient is a 40 y.o. male  who presents with SOB. Pt states he was treated in December for possible pneumonia. He took antibiotics and felt better for a few weeks. In mid January he went to ED for cough SOB and fever and workup was negative. He returned to his PCP yesterday for same symptoms, cough, fever, congestion, and was given IM Rocephin and Levaquin, upon review of CXR he was told to go to ED for further workup. In the emergency department he was saturating 89 to 90% on room air and was placed on 2 L supplemental oxygen. CXR-   Large right pleural effusion and suspected underlying airspace infiltrates. \"    Restrictions/Precautions:  Restrictions/Precautions: General Precautions, Fall Risk     SUBJECTIVE: Pt seated in bedside chair upon arrival, agreeable to OT session. PAIN: Did not rate: R shoulder    Vitals: Oxygen: >95% on RA throughout  Heart Rate: 100-110's    COGNITION: WFL    ADL:   No ADL's completed this session. Pr declined . BALANCE:  Sitting Balance:  Modified Independent. Standing Balance: Supervision. BED MOBILITY:  Not Tested    TRANSFERS:  Sit to Stand:  Supervision. Stand to Sit: Supervision. FUNCTIONAL MOBILITY:  Assistive Device: None  Assist Level:  Supervision. Distance:   Completed functional mobility x1 lap around nurses station within unit hallway at fair pace, no LOB noted. Pt requires no cues for safety and assist for chest tube management- lengthy seated rest break after trial of mobility, mod fatigue noted.      ADDITIONAL ACTIVITIES:  Pt issued energy conservation handout with lengthy verbal education provided on ways to adapt within home environment in prep for d/c. Pt able to verbally problem solve various scenarios and appropriate strategies for ADL and IADL tasks with min v/c and demos good understanding. Completed to increase ease during functional tasks. ASSESSMENT:     Activity Tolerance:  Patient tolerance of  treatment: good. Discharge Recommendations: Home with assist as needed  Equipment Recommendations: Equipment Needed: No  Plan: Times Per Week: 2-3x  Times Per Day: Once a day  Current Treatment Recommendations: Balance training, Strengthening, Functional mobility training, Endurance training, Equipment evaluation, education, & procurement, Self-Care / ADL, Safety education & training, Patient/Caregiver education & training, Home management training    Patient Education  Patient Education: Energy Conservation, Importance of Increasing Activity, and Safety with transfers and mobility. Goals  Short Term Goals  Time Frame for Short Term Goals: Until discharge  Short Term Goal 1: Pt will complete BADL routine with EC techniques Indep to increase endurance in home environment. Short Term Goal 2: Pt will complete mobility to/from BR and HH distances with Indep while O2 sats remain above 90% to increase indep and endurance with all toileting. Additional Goals?: No    Following session, patient left in safe position with all fall risk precautions in place.

## 2023-02-03 NOTE — PROGRESS NOTES
CT/CV Surgery Procedure Note    2/3/2023 9:22 AM    Intrapleural tPA/Dornase administration on right side:     Date of Operation:DATE@ 9:22 AM  Surgeon/  :Dr. Sheryle Bun, PA-C  Anesthesia/Local : None  Operation:  Intrapleural tPA/Dornase administration at bed side   Indication for the procedure: Alteplase ( tPA) administration intrapleurally daily for right side loculated/ pleural effusion/ empyema/ fibrothorax via pigtail catheter  Consent: Verbal consent obtained from patient after explaining the benefits,risks and associated complications including but not limited to development of pneumothorax and hemothorax with requirement of chest tube placement. Estimated Blood Loss: None   Complications: None. Description of Procedure: The patient was kept in sitting position comfortable with arms resting on a support table. The patient was identified and the procedure verified as tPA/Dornase administration intrapleurally on right side side of chest. A time out was held and the above information confirmed by registered nurse taking care of patient. By using standered aseptic and sterile precautions, the right side side pigtail catheter was uncapped and a connection kit was used to access the catheter. 6mg of Alteplase ( tPA) mixed with 30ml of 0.9 sterile NS and Dornase 5 mg mixed with 30 ml of sterile water were easily instilled into the pleural space. The pigtail catheter stop clock was turned to the off position (clamped). Nurse instructed to rotate the patient 90 degrees every 15 minutes for duration of clamp time, which is 3 hours. Nurse instructed to open stop clock (unclamp) after 3 hour duration or earlier if symptoms of SOB, chest discomfort, or cough persist since administration of medications.      Patient tolerated procedure well     The plan of care was discussed in detail with Dr. Mehreen Watson     Electronically signed by Brant Ruiz PA-C on 2/3/2023 at 9:22 AM

## 2023-02-03 NOTE — PLAN OF CARE
Problem: Discharge Planning  Goal: Discharge to home or other facility with appropriate resources  Outcome: Progressing  Flowsheets (Taken 2/2/2023 1949)  Discharge to home or other facility with appropriate resources:   Identify barriers to discharge with patient and caregiver   Arrange for needed discharge resources and transportation as appropriate   Identify discharge learning needs (meds, wound care, etc)   Refer to discharge planning if patient needs post-hospital services based on physician order or complex needs related to functional status, cognitive ability or social support system     Problem: Pain  Goal: Verbalizes/displays adequate comfort level or baseline comfort level  Outcome: Progressing     Problem: Respiratory - Adult  Goal: Achieves optimal ventilation and oxygenation  Outcome: Progressing  Flowsheets (Taken 2/2/2023 1949)  Achieves optimal ventilation and oxygenation:   Assess for changes in respiratory status   Assess for changes in mentation and behavior   Position to facilitate oxygenation and minimize respiratory effort   Oxygen supplementation based on oxygen saturation or arterial blood gases   Assess the need for suctioning and aspirate as needed   Assess and instruct to report shortness of breath or any respiratory difficulty   Respiratory therapy support as indicated     Problem: Skin/Tissue Integrity - Adult  Goal: Skin integrity remains intact  Outcome: Progressing  Flowsheets (Taken 2/2/2023 1949)  Skin Integrity Remains Intact:   Monitor for areas of redness and/or skin breakdown   Every 4-6 hours minimum: Change oxygen saturation probe site   Assess vascular access sites hourly   Every 4-6 hours: If on nasal continuous positive airway pressure, respiratory therapy assesses nares and determine need for appliance change or resting period     Problem: Infection - Adult  Goal: Absence of infection at discharge  Outcome: Progressing  Flowsheets (Taken 2/2/2023 1949)  Absence of infection at discharge:   Assess and monitor for signs and symptoms of infection   Monitor lab/diagnostic results   Administer medications as ordered   Monitor all insertion sites i.e., indwelling lines, tubes and drains   Instruct and encourage patient and family to use good hand hygiene technique  Goal: Absence of infection during hospitalization  Outcome: Progressing  Flowsheets (Taken 2/2/2023 1949)  Absence of infection during hospitalization:   Assess and monitor for signs and symptoms of infection   Monitor lab/diagnostic results   Instruct and encourage patient and family to use good hand hygiene technique     Problem: Safety - Adult  Goal: Free from fall injury  Outcome: Progressing   Care plan reviewed with patient verbalizes understanding of the plan of care and contribute to goal setting.

## 2023-02-03 NOTE — PLAN OF CARE
Problem: Discharge Planning  Goal: Discharge to home or other facility with appropriate resources  2/3/2023 1111 by Geni Kaye RN  Outcome: Progressing  Flowsheets (Taken 2/3/2023 1111)  Discharge to home or other facility with appropriate resources:   Identify barriers to discharge with patient and caregiver   Identify discharge learning needs (meds, wound care, etc)   Arrange for needed discharge resources and transportation as appropriate   Arrange for interpreters to assist at discharge as needed  Note: Patient plans to return home with wife and children at discharge and no needs voiced at this time. Patient working with social work for discharge planning. Problem: Pain  Goal: Verbalizes/displays adequate comfort level or baseline comfort level  2/3/2023 1111 by Geni Kaye RN  Outcome: Progressing  Flowsheets (Taken 2/3/2023 1111)  Verbalizes/displays adequate comfort level or baseline comfort level:   Encourage patient to monitor pain and request assistance   Administer analgesics based on type and severity of pain and evaluate response   Assess pain using appropriate pain scale   Implement non-pharmacological measures as appropriate and evaluate response   Notify Licensed Independent Practitioner if interventions unsuccessful or patient reports new pain  Note: Pain Assessment: 0-10  Pain Level: 3   Patient's Stated Pain Goal: 1   Is pain goal met at this time?   Yes     Non-Pharmaceutical Pain Intervention(s): Repositioned       Problem: Respiratory - Adult  Goal: Achieves optimal ventilation and oxygenation  2/3/2023 1111 by Geni Kaye RN  Outcome: Progressing  Flowsheets (Taken 2/3/2023 1111)  Achieves optimal ventilation and oxygenation:   Assess for changes in respiratory status   Assess for changes in mentation and behavior   Oxygen supplementation based on oxygen saturation or arterial blood gases   Encourage broncho-pulmonary hygiene including cough, deep breathe, incentive spirometry  Note: Remains on room air. Problem: Skin/Tissue Integrity - Adult  Goal: Skin integrity remains intact  2/3/2023 1111 by Corbin Archer RN  Outcome: Progressing  Flowsheets (Taken 2/3/2023 1111)  Skin Integrity Remains Intact:   Monitor for areas of redness and/or skin breakdown   Assess vascular access sites hourly  Note: No skin issues     Problem: Infection - Adult  Goal: Absence of infection at discharge  2/3/2023 1111 by Corbin Archer RN  Outcome: Progressing  Flowsheets (Taken 2/3/2023 1111)  Absence of infection at discharge:   Assess and monitor for signs and symptoms of infection   Monitor lab/diagnostic results   Monitor all insertion sites i.e., indwelling lines, tubes and drains   Administer medications as ordered   Instruct and encourage patient and family to use good hand hygiene technique   Identify and instruct in appropriate isolation precautions for identified infection/condition  Note: Receiving antibiotics for pneumonia. Problem: Infection - Adult  Goal: Absence of infection during hospitalization  2/3/2023 1111 by Corbin Archer RN  Outcome: Progressing  Flowsheets (Taken 2/3/2023 1111)  Absence of infection during hospitalization:   Assess and monitor for signs and symptoms of infection   Monitor lab/diagnostic results   Identify and instruct in appropriate isolation precautions for identified infection/condition   Instruct and encourage patient and family to use good hand hygiene technique   Administer medications as ordered     Problem: Safety - Adult  Goal: Free from fall injury  2/3/2023 1111 by Corbin Archer RN  Outcome: Progressing  Flowsheets (Taken 2/3/2023 1111)  Free From Fall Injury:   Instruct family/caregiver on patient safety   Based on caregiver fall risk screen, instruct family/caregiver to ask for assistance with transferring infant if caregiver noted to have fall risk factors  Note: Patient alert and oriented x4. Bed alarmed armed.  Bed wheels locked. Bedside table in reach. Patient up with assistance when ambulating. Patient verbalizes and demonstrates the use of the call light. Hourly rounding being completed. Care plan reviewed with patient. Patient verbalizes understanding of the plan of care and contributes to goal setting.

## 2023-02-04 ENCOUNTER — APPOINTMENT (OUTPATIENT)
Dept: GENERAL RADIOLOGY | Age: 43
DRG: 193 | End: 2023-02-04
Payer: COMMERCIAL

## 2023-02-04 LAB
BACTERIA BLD AEROBE CULT: NORMAL
BACTERIA BLD AEROBE CULT: NORMAL

## 2023-02-04 PROCEDURE — 71045 X-RAY EXAM CHEST 1 VIEW: CPT

## 2023-02-04 PROCEDURE — 6370000000 HC RX 637 (ALT 250 FOR IP): Performed by: INTERNAL MEDICINE

## 2023-02-04 PROCEDURE — 2060000000 HC ICU INTERMEDIATE R&B

## 2023-02-04 PROCEDURE — 2580000003 HC RX 258: Performed by: INTERNAL MEDICINE

## 2023-02-04 PROCEDURE — 2580000003 HC RX 258: Performed by: PHYSICIAN ASSISTANT

## 2023-02-04 PROCEDURE — 6360000002 HC RX W HCPCS: Performed by: INTERNAL MEDICINE

## 2023-02-04 PROCEDURE — 6360000002 HC RX W HCPCS: Performed by: PHYSICIAN ASSISTANT

## 2023-02-04 RX ORDER — BENZONATATE 100 MG/1
100 CAPSULE ORAL 2 TIMES DAILY PRN
Status: DISCONTINUED | OUTPATIENT
Start: 2023-02-04 | End: 2023-02-07 | Stop reason: HOSPADM

## 2023-02-04 RX ADMIN — SODIUM CHLORIDE, PRESERVATIVE FREE 10 ML: 5 INJECTION INTRAVENOUS at 08:46

## 2023-02-04 RX ADMIN — ACETAMINOPHEN 650 MG: 325 TABLET ORAL at 13:20

## 2023-02-04 RX ADMIN — IBUPROFEN 200 MG: 200 TABLET, FILM COATED ORAL at 10:24

## 2023-02-04 RX ADMIN — IBUPROFEN 200 MG: 200 TABLET, FILM COATED ORAL at 03:21

## 2023-02-04 RX ADMIN — ENOXAPARIN SODIUM 30 MG: 100 INJECTION SUBCUTANEOUS at 15:45

## 2023-02-04 RX ADMIN — ACETAMINOPHEN 650 MG: 325 TABLET ORAL at 19:39

## 2023-02-04 RX ADMIN — CEFTRIAXONE SODIUM 1000 MG: 1 INJECTION, POWDER, FOR SOLUTION INTRAMUSCULAR; INTRAVENOUS at 02:44

## 2023-02-04 RX ADMIN — ALTEPLASE 6 MG: 2.2 INJECTION, POWDER, LYOPHILIZED, FOR SOLUTION INTRAVENOUS at 08:57

## 2023-02-04 RX ADMIN — IBUPROFEN 200 MG: 200 TABLET, FILM COATED ORAL at 15:50

## 2023-02-04 RX ADMIN — ACETAMINOPHEN 650 MG: 325 TABLET ORAL at 06:45

## 2023-02-04 RX ADMIN — WATER 5 MG: 1 INJECTION INTRAMUSCULAR; INTRAVENOUS; SUBCUTANEOUS at 08:55

## 2023-02-04 RX ADMIN — ENOXAPARIN SODIUM 30 MG: 100 INJECTION SUBCUTANEOUS at 02:38

## 2023-02-04 ASSESSMENT — PAIN DESCRIPTION - LOCATION
LOCATION: CHEST
LOCATION: CHEST;RIB CAGE
LOCATION: CHEST
LOCATION: SHOULDER
LOCATION: CHEST
LOCATION: CHEST
LOCATION: RIB CAGE
LOCATION: CHEST
LOCATION: RIB CAGE

## 2023-02-04 ASSESSMENT — PAIN DESCRIPTION - DESCRIPTORS
DESCRIPTORS: DISCOMFORT
DESCRIPTORS: ACHING
DESCRIPTORS: DISCOMFORT
DESCRIPTORS: DULL;ACHING
DESCRIPTORS: ACHING;SHARP;DISCOMFORT
DESCRIPTORS: DISCOMFORT
DESCRIPTORS: ACHING
DESCRIPTORS: SHARP;ACHING;NAGGING
DESCRIPTORS: ACHING;SHARP;DISCOMFORT
DESCRIPTORS: DISCOMFORT

## 2023-02-04 ASSESSMENT — PAIN DESCRIPTION - ONSET
ONSET: ON-GOING

## 2023-02-04 ASSESSMENT — PAIN - FUNCTIONAL ASSESSMENT
PAIN_FUNCTIONAL_ASSESSMENT: ACTIVITIES ARE NOT PREVENTED
PAIN_FUNCTIONAL_ASSESSMENT: PREVENTS OR INTERFERES SOME ACTIVE ACTIVITIES AND ADLS
PAIN_FUNCTIONAL_ASSESSMENT: ACTIVITIES ARE NOT PREVENTED
PAIN_FUNCTIONAL_ASSESSMENT: PREVENTS OR INTERFERES SOME ACTIVE ACTIVITIES AND ADLS
PAIN_FUNCTIONAL_ASSESSMENT: PREVENTS OR INTERFERES SOME ACTIVE ACTIVITIES AND ADLS
PAIN_FUNCTIONAL_ASSESSMENT: ACTIVITIES ARE NOT PREVENTED

## 2023-02-04 ASSESSMENT — PAIN DESCRIPTION - FREQUENCY
FREQUENCY: CONTINUOUS

## 2023-02-04 ASSESSMENT — PAIN SCALES - GENERAL
PAINLEVEL_OUTOF10: 6
PAINLEVEL_OUTOF10: 3
PAINLEVEL_OUTOF10: 5
PAINLEVEL_OUTOF10: 2
PAINLEVEL_OUTOF10: 6
PAINLEVEL_OUTOF10: 4
PAINLEVEL_OUTOF10: 4
PAINLEVEL_OUTOF10: 2
PAINLEVEL_OUTOF10: 5

## 2023-02-04 ASSESSMENT — PAIN DESCRIPTION - PAIN TYPE
TYPE: ACUTE PAIN

## 2023-02-04 ASSESSMENT — PAIN DESCRIPTION - ORIENTATION
ORIENTATION: RIGHT

## 2023-02-04 ASSESSMENT — PAIN SCALES - WONG BAKER
WONGBAKER_NUMERICALRESPONSE: 2

## 2023-02-04 NOTE — PROGRESS NOTES
Progress note      [facility]             Patient:  Gabriel Chung  YOB: 1980    MRN: [id number]   Acct:  [de-identified]   4K-04/004-A  Primary Care Physician: Joseph Schulz MD    Admit Date: 1/30/2023           Subjective: Pt seen while up in chair on room air. Pt having pain at chest tube site. States waiting on pain medication when tube will be unclamped. Denies SOB or chest pain at this time. Objective:      Physical Exam:    Vitals:Patient Vitals for the past 24 hrs:   BP Temp Temp src Pulse Resp SpO2 Weight   02/04/23 1145 131/61 97.9 °F (36.6 °C) Oral (!) 103 26 94 % --   02/04/23 0715 (!) 116/58 98 °F (36.7 °C) Oral 85 20 93 % --   02/04/23 0645 -- -- -- -- -- -- 287 lb 11.2 oz (130.5 kg)   02/04/23 0630 -- 99 °F (37.2 °C) Axillary -- -- -- --   02/04/23 0320 134/65 (!) 100.5 °F (38.1 °C) Axillary 98 22 95 % --   02/04/23 0103 128/72 98.6 °F (37 °C) Oral 91 22 95 % --   02/03/23 2149 130/65 100.1 °F (37.8 °C) Axillary (!) 103 20 93 % --   02/03/23 1609 139/83 98.1 °F (36.7 °C) Oral (!) 106 22 95 % --     Weight: Weight: 287 lb 11.2 oz (130.5 kg)     24 hour intake/output:  Intake/Output Summary (Last 24 hours) at 2/4/2023 1338  Last data filed at 2/4/2023 1311  Gross per 24 hour   Intake 1509.86 ml   Output 660 ml   Net 849.86 ml       General appearance - alert, overall appearance improved, in distress with pain at chest tube site. Eyes - pupils equal and reactive, extraocular eye movements intact  Mouth - mucous membranes moist, pharynx normal without lesions  Neck - supple, no significant adenopathy  Chest - clear diminished posterior RLL. Drain right side dressing intact   Heart - normal rate, regular rhythm, normal S1, S2, no murmurs, rubs, clicks or gallops  Abdomen - soft, nontender, obeses, pos bs.   Neurological - alert, oriented, normal speech, no focal findings or movement disorder noted  Musculoskeletal - no joint tenderness, deformity or swelling  Extremities - peripheral pulses normal, no pedal edema, no clubbing or cyanosis  Skin - warm dry     Review of Labs and Diagnostic Testing:    CBC:   Recent Labs     02/03/23  0343   WBC 10.7   HGB 12.3*   HCT 37.7*   MCV 83.2   *     BMP:   Recent Labs     02/02/23  0559      K 4.4      CO2 28   BUN 12   CREATININE 0.7   CALCIUM 8.5   GLUCOSE 98     PT/INR: No results for input(s): PROTIME, INR in the last 72 hours. APTT: No results for input(s): APTT in the last 72 hours. Lipids: No results for input(s): ALKPHOS, ALT, AST, BILITOT, BILIDIR, LABALBU, AMYLASE, LIPASE in the last 72 hours. Troponin: No results for input(s): TROPONINT in the last 72 hours. Imaging:  [unfilled]    EKG:      Diet: ADULT DIET; Regular        Data:   Scheduled Meds: Scheduled Meds:   testosterone  2 patch Topical Q24H    sodium chloride flush  5-40 mL IntraVENous 2 times per day    enoxaparin  30 mg SubCUTAneous Q12H    cefTRIAXone (ROCEPHIN) IV  1,000 mg IntraVENous Q24H     Continuous Infusions:   sodium chloride       PRN Meds:. HYDROcodone 5 mg - acetaminophen, ibuprofen, sodium chloride flush, sodium chloride, ondansetron **OR** ondansetron, polyethylene glycol, acetaminophen **OR** acetaminophen, ipratropium-albuterol  Continuous Infusions:   sodium chloride           Assessment   S/p CT for Empyema   Leucocytosis improved  Sinus tachycardia  Hypogonadism  Excessive obesity    Plan   Pain management  Continue antibiotics  CT scan Monday  Cardiothoracic following- TPA today      Electronically signed by EFREN Jiang - CNP on 2/4/2023 at 1:38 PM    Assessment and plan of care discussed with supervising physician, Dr Katarina Taylor.    Pt seen and examined by me  JAN/w Peg Andrew  Pt up in chair  Breathing better  Lungs improved air exchange  Plans are for CTon Monday    Electronically signed by Nate Odell MD on 2/4/2023 at 5:21 PM

## 2023-02-04 NOTE — PLAN OF CARE
Problem: Discharge Planning  Goal: Discharge to home or other facility with appropriate resources  2/4/2023 1119 by Cynthia Carrera RN  Outcome: Progressing  Flowsheets (Taken 2/4/2023 1119)  Discharge to home or other facility with appropriate resources:   Identify barriers to discharge with patient and caregiver   Arrange for needed discharge resources and transportation as appropriate  2/4/2023 0258 by Derrek Rosas RN  Outcome: Progressing  Flowsheets (Taken 2/4/2023 0258)  Discharge to home or other facility with appropriate resources:   Identify barriers to discharge with patient and caregiver   Arrange for needed discharge resources and transportation as appropriate   Identify discharge learning needs (meds, wound care, etc)   Refer to discharge planning if patient needs post-hospital services based on physician order or complex needs related to functional status, cognitive ability or social support system     Problem: Pain  Goal: Verbalizes/displays adequate comfort level or baseline comfort level  2/4/2023 1119 by Cynthia Carrera RN  Outcome: Progressing  Flowsheets (Taken 2/4/2023 1119)  Verbalizes/displays adequate comfort level or baseline comfort level:   Assess pain using appropriate pain scale   Administer analgesics based on type and severity of pain and evaluate response  2/4/2023 0258 by Derrek Rosas RN  Outcome: Progressing  Flowsheets (Taken 2/4/2023 0258)  Verbalizes/displays adequate comfort level or baseline comfort level:   Encourage patient to monitor pain and request assistance   Assess pain using appropriate pain scale   Administer analgesics based on type and severity of pain and evaluate response   Implement non-pharmacological measures as appropriate and evaluate response   Notify Licensed Independent Practitioner if interventions unsuccessful or patient reports new pain     Problem: Respiratory - Adult  Goal: Achieves optimal ventilation and oxygenation  2/4/2023 1119 by Karissa Hand RN  Outcome: Progressing  Flowsheets (Taken 2/4/2023 1119)  Achieves optimal ventilation and oxygenation:   Assess for changes in respiratory status   Assess for changes in mentation and behavior  2/4/2023 0258 by Sheri Melendez RN  Outcome: Progressing  Flowsheets (Taken 2/4/2023 0258)  Achieves optimal ventilation and oxygenation:   Assess for changes in respiratory status   Assess for changes in mentation and behavior   Position to facilitate oxygenation and minimize respiratory effort   Oxygen supplementation based on oxygen saturation or arterial blood gases   Assess and instruct to report shortness of breath or any respiratory difficulty     Problem: Skin/Tissue Integrity - Adult  Goal: Skin integrity remains intact  2/4/2023 1119 by Karissa Hand RN  Outcome: Progressing  Flowsheets (Taken 2/4/2023 1119)  Skin Integrity Remains Intact:   Monitor for areas of redness and/or skin breakdown   Assess vascular access sites hourly  2/4/2023 0258 by Sheri Melendez RN  Outcome: Progressing  Flowsheets (Taken 2/4/2023 0258)  Skin Integrity Remains Intact:   Monitor for areas of redness and/or skin breakdown   Assess vascular access sites hourly     Problem: Infection - Adult  Goal: Absence of infection at discharge  2/4/2023 1119 by Karissa Hadn RN  Outcome: Progressing  Flowsheets (Taken 2/4/2023 1119)  Absence of infection at discharge:   Assess and monitor for signs and symptoms of infection   Monitor lab/diagnostic results  2/4/2023 0258 by Sheri Melendez RN  Outcome: Progressing  Flowsheets (Taken 2/4/2023 0258)  Absence of infection at discharge:   Assess and monitor for signs and symptoms of infection   Monitor lab/diagnostic results   Monitor all insertion sites i.e., indwelling lines, tubes and drains   Administer medications as ordered  Goal: Absence of infection during hospitalization  2/4/2023 1119 by Karissa Hand RN  Outcome: Progressing  Flowsheets (Taken 2/4/2023 1119)  Absence of infection during hospitalization:   Assess and monitor for signs and symptoms of infection   Monitor lab/diagnostic results  2/4/2023 0258 by Concepcion Bashir RN  Outcome: Progressing  Flowsheets (Taken 2/4/2023 0258)  Absence of infection during hospitalization:   Assess and monitor for signs and symptoms of infection   Monitor lab/diagnostic results   Monitor all insertion sites i.e., indwelling lines, tubes and drains   Administer medications as ordered   Instruct and encourage patient and family to use good hand hygiene technique     Problem: Safety - Adult  Goal: Free from fall injury  2/4/2023 1119 by Maryjo Chew RN  Outcome: Progressing  Flowsheets (Taken 2/4/2023 1119)  Free From Fall Injury: Instruct family/caregiver on patient safety  2/4/2023 0258 by Concepcion Bashir RN  Outcome: Progressing  Flowsheets (Taken 2/4/2023 0258)  Free From Fall Injury:   Instruct family/caregiver on patient safety   Based on caregiver fall risk screen, instruct family/caregiver to ask for assistance with transferring infant if caregiver noted to have fall risk factors   Care plan reviewed with patient. Patient verbalize understanding of the plan of care and contribute to goal setting.

## 2023-02-04 NOTE — PROGRESS NOTES
Reported off to primary nurse Mariusz Martinez. Patient in chair watching tv. Call light within reach. Chair alarm on .  Rishi HOLLEY/RSC

## 2023-02-04 NOTE — PLAN OF CARE
Problem: Discharge Planning  Goal: Discharge to home or other facility with appropriate resources  Outcome: Progressing  Flowsheets (Taken 2/4/2023 0258)  Discharge to home or other facility with appropriate resources:   Identify barriers to discharge with patient and caregiver   Arrange for needed discharge resources and transportation as appropriate   Identify discharge learning needs (meds, wound care, etc)   Refer to discharge planning if patient needs post-hospital services based on physician order or complex needs related to functional status, cognitive ability or social support system     Problem: Pain  Goal: Verbalizes/displays adequate comfort level or baseline comfort level  Outcome: Progressing  Flowsheets (Taken 2/4/2023 0258)  Verbalizes/displays adequate comfort level or baseline comfort level:   Encourage patient to monitor pain and request assistance   Assess pain using appropriate pain scale   Administer analgesics based on type and severity of pain and evaluate response   Implement non-pharmacological measures as appropriate and evaluate response   Notify Licensed Independent Practitioner if interventions unsuccessful or patient reports new pain     Problem: Respiratory - Adult  Goal: Achieves optimal ventilation and oxygenation  Outcome: Progressing  Flowsheets (Taken 2/4/2023 0258)  Achieves optimal ventilation and oxygenation:   Assess for changes in respiratory status   Assess for changes in mentation and behavior   Position to facilitate oxygenation and minimize respiratory effort   Oxygen supplementation based on oxygen saturation or arterial blood gases   Assess and instruct to report shortness of breath or any respiratory difficulty     Problem: Skin/Tissue Integrity - Adult  Goal: Skin integrity remains intact  Outcome: Progressing  Flowsheets (Taken 2/4/2023 0258)  Skin Integrity Remains Intact:   Monitor for areas of redness and/or skin breakdown   Assess vascular access sites hourly Problem: Infection - Adult  Goal: Absence of infection at discharge  Outcome: Progressing  Flowsheets (Taken 2/4/2023 0258)  Absence of infection at discharge:   Assess and monitor for signs and symptoms of infection   Monitor lab/diagnostic results   Monitor all insertion sites i.e., indwelling lines, tubes and drains   Administer medications as ordered     Problem: Safety - Adult  Goal: Free from fall injury  Outcome: Progressing  Flowsheets (Taken 2/4/2023 0258)  Free From Fall Injury:   Instruct family/caregiver on patient safety   Based on caregiver fall risk screen, instruct family/caregiver to ask for assistance with transferring infant if caregiver noted to have fall risk factors     Care plan reviewed with patient. Patient verbalize understanding of the plan of care and contribute to goal setting.

## 2023-02-04 NOTE — PROGRESS NOTES
0900- Dr. Kayy Warner instilled TPA. Instructed this RN to keep chest tube clamped for 4 hours. 1310- Chest tube unclamped and reconnected to -20 suction. Will continue to monitor.

## 2023-02-04 NOTE — PROGRESS NOTES
CT/CV Surgery Progress Note    2023 9:29 AM  Surgeon:  Dr. Angel North     Subjective:  Mr. Sherie Adams is resting comfortably in chair, alert, and in no acute distress. Pt denies chest pressure, SOB, fever,chills, N/V/D. Chest tube with 390 past 24 hours. Vital Signs: BP (!) 116/58   Pulse 85   Temp 98 °F (36.7 °C) (Oral)   Resp 20   Ht 5' 10\" (1.778 m)   Wt 287 lb 11.2 oz (130.5 kg)   SpO2 93%   BMI 41.28 kg/m²    Temp (24hrs), Av.9 °F (37.2 °C), Min:97.8 °F (36.6 °C), Max:100.5 °F (38.1 °C)       Labs:   CBC:  Recent Labs     23  0559 23  0343   WBC 12.0* 10.7   HGB 12.0* 12.3*   HCT 37.0* 37.7*   MCV 83.3 83.2   * 471*       BMP:   Recent Labs     23  0559      K 4.4      CO2 28   BUN 12   CREATININE 0.7       Imaging:  CXR: 2023  Findings: There is mild improvement of the right upper lobe lung aeration. Pigtail    drainage catheter is noted right mid to lower chest with no obvious change    in position. There is no pneumothorax. Loculated pleural fluid is likely    in the right chest which has moderate volume of residual fluid despite the    recent chest drain placement. The left lung is hypoventilated. Heart size    and pulmonary vasculature are at the upper limits of normal.           Impression   Impression:   Minimal change from comparison, see above for details. Intake/Output Summary (Last 24 hours) at 2023 0929  Last data filed at 2023 0846  Gross per 24 hour   Intake 1559.86 ml   Output 690 ml   Net 869.86 ml       Scheduled Meds:    testosterone  2 patch Topical Q24H    sodium chloride flush  5-40 mL IntraVENous 2 times per day    enoxaparin  30 mg SubCUTAneous Q12H    cefTRIAXone (ROCEPHIN) IV  1,000 mg IntraVENous Q24H     ROS: All neg unless specifically mentioned in subjective section.      Exam:  General Appearance: alert ,conversing, in no acute distress  Cardiovascular: normal rate, regular rhythm, normal S1 and S2, no murmurs, rubs, clicks, or gallops  Pulmonary/Chest: decreased BS in right base  Neurological: alert, oriented, normal speech, no focal findings or movement disorder noted    Assessment:   Patient Active Problem List   Diagnosis    Hypogonadotropic hypogonadism syndrome, male ([facility] [address]    Hypogonadism in male    Mixed hyperlipidemia    Pneumonia due to organism    Acute pneumonia    Pleural effusion, right    Abnormal chest x-ray     Plan:   CXR reviewed- Continue daily CXR's   TPA/Dornase today again (3nd day)  3.  Will need repeat chest Liberty Perez MD

## 2023-02-05 ENCOUNTER — APPOINTMENT (OUTPATIENT)
Dept: GENERAL RADIOLOGY | Age: 43
DRG: 193 | End: 2023-02-05
Payer: COMMERCIAL

## 2023-02-05 LAB
BACTERIA BLD AEROBE CULT: NORMAL
BACTERIA SPEC ANAEROBE CULT: NORMAL
BACTERIA SPEC BFLD CULT: NORMAL
GRAM STN SPEC: NORMAL

## 2023-02-05 PROCEDURE — 2580000003 HC RX 258: Performed by: INTERNAL MEDICINE

## 2023-02-05 PROCEDURE — 2580000003 HC RX 258: Performed by: PHYSICIAN ASSISTANT

## 2023-02-05 PROCEDURE — 6370000000 HC RX 637 (ALT 250 FOR IP): Performed by: INTERNAL MEDICINE

## 2023-02-05 PROCEDURE — 6360000002 HC RX W HCPCS: Performed by: PHYSICIAN ASSISTANT

## 2023-02-05 PROCEDURE — 6360000002 HC RX W HCPCS: Performed by: INTERNAL MEDICINE

## 2023-02-05 PROCEDURE — 2060000000 HC ICU INTERMEDIATE R&B

## 2023-02-05 PROCEDURE — 71045 X-RAY EXAM CHEST 1 VIEW: CPT

## 2023-02-05 RX ADMIN — BENZONATATE 100 MG: 100 CAPSULE ORAL at 02:28

## 2023-02-05 RX ADMIN — ENOXAPARIN SODIUM 30 MG: 100 INJECTION SUBCUTANEOUS at 15:40

## 2023-02-05 RX ADMIN — ACETAMINOPHEN 650 MG: 325 TABLET ORAL at 04:28

## 2023-02-05 RX ADMIN — SODIUM CHLORIDE, PRESERVATIVE FREE 10 ML: 5 INJECTION INTRAVENOUS at 20:50

## 2023-02-05 RX ADMIN — SODIUM CHLORIDE, PRESERVATIVE FREE 10 ML: 5 INJECTION INTRAVENOUS at 04:21

## 2023-02-05 RX ADMIN — IBUPROFEN 200 MG: 200 TABLET, FILM COATED ORAL at 15:40

## 2023-02-05 RX ADMIN — IBUPROFEN 200 MG: 200 TABLET, FILM COATED ORAL at 01:40

## 2023-02-05 RX ADMIN — WATER 5 MG: 1 INJECTION INTRAMUSCULAR; INTRAVENOUS; SUBCUTANEOUS at 09:30

## 2023-02-05 RX ADMIN — BENZONATATE 100 MG: 100 CAPSULE ORAL at 20:50

## 2023-02-05 RX ADMIN — SODIUM CHLORIDE, PRESERVATIVE FREE 10 ML: 5 INJECTION INTRAVENOUS at 01:40

## 2023-02-05 RX ADMIN — ENOXAPARIN SODIUM 30 MG: 100 INJECTION SUBCUTANEOUS at 04:19

## 2023-02-05 RX ADMIN — BENZONATATE 100 MG: 100 CAPSULE ORAL at 12:00

## 2023-02-05 RX ADMIN — SODIUM CHLORIDE, PRESERVATIVE FREE 10 ML: 5 INJECTION INTRAVENOUS at 09:30

## 2023-02-05 RX ADMIN — ACETAMINOPHEN 650 MG: 325 TABLET ORAL at 10:37

## 2023-02-05 RX ADMIN — ALTEPLASE 6 MG: 2.2 INJECTION, POWDER, LYOPHILIZED, FOR SOLUTION INTRAVENOUS at 09:30

## 2023-02-05 RX ADMIN — ACETAMINOPHEN 650 MG: 325 TABLET ORAL at 19:12

## 2023-02-05 RX ADMIN — IBUPROFEN 200 MG: 200 TABLET, FILM COATED ORAL at 09:28

## 2023-02-05 RX ADMIN — CEFTRIAXONE SODIUM 1000 MG: 1 INJECTION, POWDER, FOR SOLUTION INTRAMUSCULAR; INTRAVENOUS at 04:22

## 2023-02-05 RX ADMIN — IBUPROFEN 200 MG: 200 TABLET, FILM COATED ORAL at 23:57

## 2023-02-05 ASSESSMENT — PAIN SCALES - GENERAL
PAINLEVEL_OUTOF10: 4
PAINLEVEL_OUTOF10: 2
PAINLEVEL_OUTOF10: 4
PAINLEVEL_OUTOF10: 2
PAINLEVEL_OUTOF10: 0
PAINLEVEL_OUTOF10: 4
PAINLEVEL_OUTOF10: 4
PAINLEVEL_OUTOF10: 2
PAINLEVEL_OUTOF10: 5
PAINLEVEL_OUTOF10: 2
PAINLEVEL_OUTOF10: 6

## 2023-02-05 ASSESSMENT — PAIN DESCRIPTION - PAIN TYPE
TYPE: ACUTE PAIN

## 2023-02-05 ASSESSMENT — PAIN DESCRIPTION - DESCRIPTORS
DESCRIPTORS: ACHING;DISCOMFORT
DESCRIPTORS: ACHING;DULL;DISCOMFORT
DESCRIPTORS: ACHING;DISCOMFORT;STABBING
DESCRIPTORS: ACHING;DISCOMFORT
DESCRIPTORS: DISCOMFORT

## 2023-02-05 ASSESSMENT — PAIN DESCRIPTION - FREQUENCY
FREQUENCY: CONTINUOUS

## 2023-02-05 ASSESSMENT — PAIN - FUNCTIONAL ASSESSMENT
PAIN_FUNCTIONAL_ASSESSMENT: PREVENTS OR INTERFERES SOME ACTIVE ACTIVITIES AND ADLS

## 2023-02-05 ASSESSMENT — PAIN DESCRIPTION - LOCATION
LOCATION: CHEST
LOCATION: CHEST
LOCATION: RIB CAGE
LOCATION: CHEST
LOCATION: RIB CAGE;SHOULDER
LOCATION: SHOULDER
LOCATION: RIB CAGE;SHOULDER
LOCATION: CHEST

## 2023-02-05 ASSESSMENT — PAIN DESCRIPTION - ONSET
ONSET: ON-GOING

## 2023-02-05 ASSESSMENT — PAIN SCALES - WONG BAKER: WONGBAKER_NUMERICALRESPONSE: 2

## 2023-02-05 ASSESSMENT — PAIN DESCRIPTION - ORIENTATION
ORIENTATION: RIGHT

## 2023-02-05 NOTE — PROGRESS NOTES
Progress note      [facility]             Patient:  Kel Jean  YOB: 1980    MRN: [id number]   Acct:  [de-identified]   [id number]  Primary Care Physician: Cyrus Lugo MD    Admit Date: 1/30/2023           Subjective: Pt up in chair, continued shoulder pain with worsen since chest tube has been unclamped. Pt denies chest pain, no new SOB, on room air, no fever and appetite is good. Taking Allegra for allergies       Objective:      Physical Exam:    Vitals:Patient Vitals for the past 24 hrs:   BP Temp Temp src Pulse Resp SpO2 Weight   02/05/23 1111 135/70 98 °F (36.7 °C) Oral 100 22 93 % --   02/05/23 0730 122/62 97.9 °F (36.6 °C) Oral 80 22 94 % --   02/05/23 0422 -- -- -- -- -- -- 286 lb 9.6 oz (130 kg)   02/05/23 0415 127/70 99 °F (37.2 °C) Oral 87 20 94 % --   02/04/23 2358 (!) 142/82 98.6 °F (37 °C) Oral (!) 101 22 96 % --   02/04/23 1945 135/62 (!) 100.5 °F (38.1 °C) Oral 100 22 95 % --   02/04/23 1530 137/71 98.4 °F (36.9 °C) Oral (!) 110 24 94 % --     Weight: Weight: 286 lb 9.6 oz (130 kg)     24 hour intake/output:  Intake/Output Summary (Last 24 hours) at 2/5/2023 1430  Last data filed at 2/5/2023 1330  Gross per 24 hour   Intake 1481.23 ml   Output 610 ml   Net 871.23 ml       General appearance - alert, well appearing, and in no distress  Eyes - pupils equal and reactive, extraocular eye movements intact, red with allergies  Mouth - mucous membranes moist, pharynx normal without lesions  Neck - supple,   Chest - diminished air entry posterior RLL, some improvement. Clear in other areas  Heart - tachy , regular rhythm, normal S1, S2, no murmurs, rubs, clicks or gallops  Abdomen - soft, nontender, obese, no masses or organomegaly, pos bs.   Neurological - alert, oriented, normal speech, no focal findings or movement disorder noted  Musculoskeletal - no joint tenderness, deformity or swelling  Extremities - peripheral pulses normal, no pedal edema, no clubbing or cyanosis  Skin - normal coloration and turgor, no rashes, no suspicious skin lesions noted    Review of Labs and Diagnostic Testing:    CBC:   Recent Labs     02/03/23  0343   WBC 10.7   HGB 12.3*   HCT 37.7*   MCV 83.2   *     BMP: No results for input(s): NA, K, CL, CO2, PHOS, BUN, CREATININE, CALCIUM, GLUCOSE in the last 72 hours. PT/INR: No results for input(s): PROTIME, INR in the last 72 hours. APTT: No results for input(s): APTT in the last 72 hours. Lipids: No results for input(s): ALKPHOS, ALT, AST, BILITOT, BILIDIR, LABALBU, AMYLASE, LIPASE in the last 72 hours. Troponin: No results for input(s): TROPONINT in the last 72 hours. Imaging:  [unfilled]    EKG:      Diet: ADULT DIET;  Regular        Data:   Scheduled Meds: Scheduled Meds:   testosterone  2 patch Topical Q24H    sodium chloride flush  5-40 mL IntraVENous 2 times per day    enoxaparin  30 mg SubCUTAneous Q12H    cefTRIAXone (ROCEPHIN) IV  1,000 mg IntraVENous Q24H     Continuous Infusions:   sodium chloride       PRN Meds:.benzonatate, HYDROcodone 5 mg - acetaminophen, ibuprofen, sodium chloride flush, sodium chloride, ondansetron **OR** ondansetron, polyethylene glycol, acetaminophen **OR** acetaminophen, ipratropium-albuterol  Continuous Infusions:   sodium chloride           Assessment   S/p CT for Empyema   Leucocytosis improved  Sinus tachycardia  Hypogonadism  Excessive obesity    Plan   Pain management  Bronchodilators  CT tomorrow  Cardiothoracic following- TPA today- 4th one  BMP CBC in am    Electronically signed by EFREN Colin - CNP on 2/5/2023 at 2:30 PM    Assessment and plan of care discussed with supervising physician, Dr Christie Sharp.    Pt seen and examined by me  D/w [facility] not worse  Scheduled for CT chest tomorrow  Chest tube in place  Cont incentive spirometry   Lungs air entry diminished Rt base    Electronically signed by Harrie Bloch, MD on 2/5/2023 at 4:51 PM

## 2023-02-05 NOTE — PLAN OF CARE
Problem: Discharge Planning  Goal: Discharge to home or other facility with appropriate resources  2/5/2023 1447 by Adri Harrington RN  Outcome: Not Progressing  Flowsheets (Taken 2/5/2023 1447)  Discharge to home or other facility with appropriate resources:   Identify barriers to discharge with patient and caregiver   Arrange for needed discharge resources and transportation as appropriate  Note: Planning for CT Monday. 2/5/2023 0536 by Hope Ramirez RN  Outcome: Progressing  Flowsheets (Taken 2/4/2023 1945)  Discharge to home or other facility with appropriate resources:   Identify barriers to discharge with patient and caregiver   Arrange for needed discharge resources and transportation as appropriate   Identify discharge learning needs (meds, wound care, etc)   Refer to discharge planning if patient needs post-hospital services based on physician order or complex needs related to functional status, cognitive ability or social support system     Problem: Pain  Goal: Verbalizes/displays adequate comfort level or baseline comfort level  2/5/2023 1447 by Adri Harrington RN  Outcome: Not Progressing  Flowsheets (Taken 2/5/2023 1447)  Verbalizes/displays adequate comfort level or baseline comfort level:   Encourage patient to monitor pain and request assistance   Assess pain using appropriate pain scale  Note: Pain Assessment: None - Denies Pain  Pain Level: 5   Patient's Stated Pain Goal: 0 - No pain   Is pain goal met at this time?   No     Non-Pharmaceutical Pain Intervention(s): Repositioned, Rest, Emotional support   2/5/2023 0536 by Hope Ramirez RN  Outcome: Progressing  Flowsheets (Taken 2/5/2023 0536)  Verbalizes/displays adequate comfort level or baseline comfort level:   Encourage patient to monitor pain and request assistance   Assess pain using appropriate pain scale   Administer analgesics based on type and severity of pain and evaluate response   Notify Licensed Independent Practitioner if interventions unsuccessful or patient reports new pain   Implement non-pharmacological measures as appropriate and evaluate response     Problem: Respiratory - Adult  Goal: Achieves optimal ventilation and oxygenation  2/5/2023 1447 by Claudia Recinos RN  Outcome: Progressing  Flowsheets (Taken 2/5/2023 1447)  Achieves optimal ventilation and oxygenation:   Assess for changes in respiratory status   Assess for changes in mentation and behavior  2/5/2023 0536 by Ina Melvin RN  Outcome: Progressing  Flowsheets (Taken 2/4/2023 1945)  Achieves optimal ventilation and oxygenation:   Assess for changes in respiratory status   Assess for changes in mentation and behavior   Position to facilitate oxygenation and minimize respiratory effort     Problem: Skin/Tissue Integrity - Adult  Goal: Skin integrity remains intact  2/5/2023 1447 by Claudia Recinos RN  Outcome: Progressing  Flowsheets (Taken 2/5/2023 1447)  Skin Integrity Remains Intact:   Monitor for areas of redness and/or skin breakdown   Assess vascular access sites hourly  2/5/2023 0536 by Ina Melvin RN  Outcome: Progressing  Flowsheets (Taken 2/4/2023 1945)  Skin Integrity Remains Intact:   Monitor for areas of redness and/or skin breakdown   Assess vascular access sites hourly   Every 4-6 hours minimum: Change oxygen saturation probe site     Problem: Infection - Adult  Goal: Absence of infection at discharge  2/5/2023 1447 by Claudia Recinos RN  Outcome: Progressing  Flowsheets (Taken 2/5/2023 1447)  Absence of infection at discharge:   Assess and monitor for signs and symptoms of infection   Monitor lab/diagnostic results  2/5/2023 0536 by Ina Melvin RN  Outcome: Progressing  Flowsheets (Taken 2/4/2023 1945)  Absence of infection at discharge:   Monitor lab/diagnostic results   Administer medications as ordered  Goal: Absence of infection during hospitalization  2/5/2023 1447 by Claudia Recinos RN  Outcome: Progressing  Flowsheets (Taken 2/5/2023 1447)  Absence of infection during hospitalization:   Assess and monitor for signs and symptoms of infection   Monitor lab/diagnostic results  2/5/2023 0536 by Roddy Hallman RN  Outcome: Progressing  Flowsheets (Taken 2/4/2023 1133 by Morena Hurd RN)  Absence of infection during hospitalization:   Assess and monitor for signs and symptoms of infection   Monitor lab/diagnostic results     Problem: Safety - Adult  Goal: Free from fall injury  2/5/2023 1447 by Morena Hurd RN  Outcome: Progressing  [address] (Taken 2/5/2023 1447)  Free From Fall Injury: Instruct family/caregiver on patient safety  2/5/2023 0536 by Roddy Hallman RN  Outcome: Progressing  Flowsheets (Taken 2/5/2023 0536)  Free From Fall Injury: Instruct family/caregiver on patient safety   Care plan reviewed with patient. Patient verbalize understanding of the plan of care and contribute to goal setting.

## 2023-02-05 NOTE — PLAN OF CARE
Problem: Discharge Planning  Goal: Discharge to home or other facility with appropriate resources  Outcome: Progressing  Flowsheets (Taken 2/4/2023 1945)  Discharge to home or other facility with appropriate resources:   Identify barriers to discharge with patient and caregiver   Arrange for needed discharge resources and transportation as appropriate   Identify discharge learning needs (meds, wound care, etc)   Refer to discharge planning if patient needs post-hospital services based on physician order or complex needs related to functional status, cognitive ability or social support system     Problem: Pain  Goal: Verbalizes/displays adequate comfort level or baseline comfort level  Outcome: Progressing  Flowsheets (Taken 2/5/2023 0536)  Verbalizes/displays adequate comfort level or baseline comfort level:   Encourage patient to monitor pain and request assistance   Assess pain using appropriate pain scale   Administer analgesics based on type and severity of pain and evaluate response   Notify Licensed Independent Practitioner if interventions unsuccessful or patient reports new pain   Implement non-pharmacological measures as appropriate and evaluate response     Problem: Respiratory - Adult  Goal: Achieves optimal ventilation and oxygenation  Outcome: Progressing  Flowsheets (Taken 2/4/2023 1945)  Achieves optimal ventilation and oxygenation:   Assess for changes in respiratory status   Assess for changes in mentation and behavior   Position to facilitate oxygenation and minimize respiratory effort     Problem: Skin/Tissue Integrity - Adult  Goal: Skin integrity remains intact  Outcome: Progressing  Flowsheets (Taken 2/4/2023 1945)  Skin Integrity Remains Intact:   Monitor for areas of redness and/or skin breakdown   Assess vascular access sites hourly   Every 4-6 hours minimum: Change oxygen saturation probe site     Problem: Infection - Adult  Goal: Absence of infection at discharge  Outcome: Progressing  Flowsheets (Taken 2/4/2023 1945)  Absence of infection at discharge:   Monitor lab/diagnostic results   Administer medications as ordered     Problem: Infection - Adult  Goal: Absence of infection during hospitalization  Outcome: Progressing  Flowsheets (Taken 2/4/2023 1133 by Morena Hurd RN)  Absence of infection during hospitalization:   Assess and monitor for signs and symptoms of infection   Monitor lab/diagnostic results     Problem: Safety - Adult  Goal: Free from fall injury  Outcome: Progressing  Flowsheets (Taken 2/5/2023 0536)  Free From Fall Injury: Instruct family/caregiver on patient safety    Care plan reviewed with patient and spouse. Patient and spouse verbalize understanding of the plan of care and contribute to goal setting.

## 2023-02-05 NOTE — PROGRESS NOTES
CT/CV Surgery Progress Note    2023 9:28 AM  Surgeon:  Dr. Angel North     Subjective:  Mr. Sherie Adams is resting comfortably in chair, alert, and in no acute distress. Pt denies chest pressure, SOB, fever,chills, N/V/D. Chest tube with 610 past 24 hours. Vital Signs: /62   Pulse 80   Temp 97.9 °F (36.6 °C) (Oral)   Resp 22   Ht 5' 10\" (1.778 m)   Wt 286 lb 9.6 oz (130 kg)   SpO2 94%   BMI 41.12 kg/m²    Temp (24hrs), Av.7 °F (37.1 °C), Min:97.9 °F (36.6 °C), Max:100.5 °F (38.1 °C)       Labs:   CBC:  Recent Labs     23  0343   WBC 10.7   HGB 12.3*   HCT 37.7*   MCV 83.2   *       BMP:   No results for input(s): NA, K, CL, CO2, PHOS, BUN, CREATININE, CA, MG, POCGLU in the last 72 hours. Imaging:  CXR: 2023  Findings: There is mild improvement of the right upper lobe lung aeration. Pigtail    drainage catheter is noted right mid to lower chest with no obvious change    in position. There is no pneumothorax. Loculated pleural fluid is likely    in the right chest which has moderate volume of residual fluid despite the    recent chest drain placement. The left lung is hypoventilated. Heart size    and pulmonary vasculature are at the upper limits of normal.           Impression   Impression:   Minimal change from comparison, see above for details. Intake/Output Summary (Last 24 hours) at 2023 0954  Last data filed at 2023 0422  Gross per 24 hour   Intake 950 ml   Output 610 ml   Net 340 ml       Scheduled Meds:    alteplase (ACTIVASE) syringe  6 mg IntraPLEUral Once    And    dornase (PULMOZYME) syringe  5 mg IntraPLEUral Once    testosterone  2 patch Topical Q24H    sodium chloride flush  5-40 mL IntraVENous 2 times per day    enoxaparin  30 mg SubCUTAneous Q12H    cefTRIAXone (ROCEPHIN) IV  1,000 mg IntraVENous Q24H     ROS: All neg unless specifically mentioned in subjective section.      Exam:  General Appearance: alert ,conversing, in no acute distress  Cardiovascular: normal rate, regular rhythm, normal S1 and S2, no murmurs, rubs, clicks, or gallops  Pulmonary/Chest: decreased BS in right base  Neurological: alert, oriented, normal speech, no focal findings or movement disorder noted    Assessment:   Patient Active Problem List   Diagnosis    Hypogonadotropic hypogonadism syndrome, male ([facility] [address]    Hypogonadism in male    Mixed hyperlipidemia    Pneumonia due to organism    Acute pneumonia    Pleural effusion, right    Abnormal chest x-ray     Plan:   CXR reviewed- Continue daily CXR's  there is imrovement his costal margin is clear  TPA/Dornase today again (4nd day)  3.  Will need repeat chest CT tomorrow    Rhonda Schafer MD

## 2023-02-05 NOTE — PROGRESS NOTES
0900- TPA instilled by Dr. Burton Aranda. Chest tube clamped at this time. 1300- Chest tube unclamped and connected to -20 suction. Chest tube draining appropriately.

## 2023-02-06 ENCOUNTER — APPOINTMENT (OUTPATIENT)
Dept: GENERAL RADIOLOGY | Age: 43
DRG: 193 | End: 2023-02-06
Payer: COMMERCIAL

## 2023-02-06 ENCOUNTER — APPOINTMENT (OUTPATIENT)
Dept: CT IMAGING | Age: 43
DRG: 193 | End: 2023-02-06
Payer: COMMERCIAL

## 2023-02-06 DIAGNOSIS — E29.1 HYPOGONADISM IN MALE: ICD-10-CM

## 2023-02-06 LAB
ANION GAP SERPL CALC-SCNC: 10 MEQ/L (ref 8–16)
ANISOCYTOSIS BLD QL SMEAR: PRESENT
AUTO DIFF PNL BLD: ABNORMAL
BASOPHILS ABSOLUTE: 0 THOU/MM3 (ref 0–0.1)
BASOPHILS NFR BLD AUTO: 0.4 %
BUN SERPL-MCNC: 14 MG/DL (ref 7–22)
CA-I BLD ISE-SCNC: 1.08 MMOL/L (ref 1.12–1.32)
CALCIUM SERPL-MCNC: 8.2 MG/DL (ref 8.5–10.5)
CHLORIDE SERPL-SCNC: 100 MEQ/L (ref 98–111)
CO2 SERPL-SCNC: 27 MEQ/L (ref 23–33)
CREAT SERPL-MCNC: 0.9 MG/DL (ref 0.4–1.2)
DEPRECATED RDW RBC AUTO: 43.4 FL (ref 35–45)
EOSINOPHIL NFR BLD AUTO: 3.7 %
EOSINOPHILS ABSOLUTE: 0.4 THOU/MM3 (ref 0–0.4)
ERYTHROCYTE [DISTWIDTH] IN BLOOD BY AUTOMATED COUNT: 13.9 % (ref 11.5–14.5)
GFR SERPL CREATININE-BSD FRML MDRD: > 60 ML/MIN/1.73M2
GLUCOSE SERPL-MCNC: 90 MG/DL (ref 70–108)
HCT VFR BLD AUTO: 36.4 % (ref 42–52)
HGB BLD-MCNC: 11.5 GM/DL (ref 14–18)
IMM GRANULOCYTES # BLD AUTO: 1.2 THOU/MM3 (ref 0–0.07)
IMM GRANULOCYTES NFR BLD AUTO: 10.4 %
LYMPHOCYTES ABSOLUTE: 2.4 THOU/MM3 (ref 1–4.8)
LYMPHOCYTES NFR BLD AUTO: 21.1 %
MCH RBC QN AUTO: 27 PG (ref 26–33)
MCHC RBC AUTO-ENTMCNC: 31.6 GM/DL (ref 32.2–35.5)
MCV RBC AUTO: 85.4 FL (ref 80–94)
MONOCYTES ABSOLUTE: 1.2 THOU/MM3 (ref 0.4–1.3)
MONOCYTES NFR BLD AUTO: 10 %
NEUTROPHILS NFR BLD AUTO: 54.4 %
NRBC BLD AUTO-RTO: 0 /100 WBC
PATHOLOGIST REVIEW: ABNORMAL
PLATELET # BLD AUTO: 481 THOU/MM3 (ref 130–400)
PLATELET BLD QL SMEAR: ABNORMAL
PMV BLD AUTO: 8.7 FL (ref 9.4–12.4)
POLYCHROMASIA BLD QL SMEAR: ABNORMAL
POTASSIUM SERPL-SCNC: 4.3 MEQ/L (ref 3.5–5.2)
RBC # BLD AUTO: 4.26 MILL/MM3 (ref 4.7–6.1)
SCAN OF BLOOD SMEAR: NORMAL
SEGMENTED NEUTROPHILS ABSOLUTE COUNT: 6.3 THOU/MM3 (ref 1.8–7.7)
SODIUM SERPL-SCNC: 137 MEQ/L (ref 135–145)
VARIANT LYMPHS BLD QL SMEAR: ABNORMAL %
WBC # BLD AUTO: 11.5 THOU/MM3 (ref 4.8–10.8)

## 2023-02-06 PROCEDURE — 85025 COMPLETE CBC W/AUTO DIFF WBC: CPT

## 2023-02-06 PROCEDURE — 71250 CT THORAX DX C-: CPT

## 2023-02-06 PROCEDURE — 97110 THERAPEUTIC EXERCISES: CPT

## 2023-02-06 PROCEDURE — 32562 LYSE CHEST FIBRIN SUBQ DAY: CPT | Performed by: PHYSICIAN ASSISTANT

## 2023-02-06 PROCEDURE — 6370000000 HC RX 637 (ALT 250 FOR IP): Performed by: INTERNAL MEDICINE

## 2023-02-06 PROCEDURE — 6360000002 HC RX W HCPCS: Performed by: INTERNAL MEDICINE

## 2023-02-06 PROCEDURE — 36415 COLL VENOUS BLD VENIPUNCTURE: CPT

## 2023-02-06 PROCEDURE — 2580000003 HC RX 258: Performed by: PHYSICIAN ASSISTANT

## 2023-02-06 PROCEDURE — 97530 THERAPEUTIC ACTIVITIES: CPT

## 2023-02-06 PROCEDURE — 71045 X-RAY EXAM CHEST 1 VIEW: CPT

## 2023-02-06 PROCEDURE — 6360000002 HC RX W HCPCS: Performed by: PHYSICIAN ASSISTANT

## 2023-02-06 PROCEDURE — 82330 ASSAY OF CALCIUM: CPT

## 2023-02-06 PROCEDURE — APPSS30 APP SPLIT SHARED TIME 16-30 MINUTES: Performed by: PHYSICIAN ASSISTANT

## 2023-02-06 PROCEDURE — 97116 GAIT TRAINING THERAPY: CPT

## 2023-02-06 PROCEDURE — 2060000000 HC ICU INTERMEDIATE R&B

## 2023-02-06 PROCEDURE — 2580000003 HC RX 258: Performed by: INTERNAL MEDICINE

## 2023-02-06 PROCEDURE — 97535 SELF CARE MNGMENT TRAINING: CPT

## 2023-02-06 PROCEDURE — 80048 BASIC METABOLIC PNL TOTAL CA: CPT

## 2023-02-06 RX ORDER — AMOXICILLIN AND CLAVULANATE POTASSIUM 875; 125 MG/1; MG/1
1 TABLET, FILM COATED ORAL EVERY 12 HOURS SCHEDULED
Status: DISCONTINUED | OUTPATIENT
Start: 2023-02-06 | End: 2023-02-07 | Stop reason: HOSPADM

## 2023-02-06 RX ADMIN — HYDROCODONE BITARTRATE AND ACETAMINOPHEN 1 TABLET: 5; 325 TABLET ORAL at 14:42

## 2023-02-06 RX ADMIN — ALTEPLASE 6 MG: 2.2 INJECTION, POWDER, LYOPHILIZED, FOR SOLUTION INTRAVENOUS at 14:36

## 2023-02-06 RX ADMIN — ENOXAPARIN SODIUM 30 MG: 100 INJECTION SUBCUTANEOUS at 16:41

## 2023-02-06 RX ADMIN — AMOXICILLIN AND CLAVULANATE POTASSIUM 1 TABLET: 875; 125 TABLET, FILM COATED ORAL at 12:14

## 2023-02-06 RX ADMIN — DORNASE ALFA 5 MG: 1 SOLUTION RESPIRATORY (INHALATION) at 14:36

## 2023-02-06 RX ADMIN — ACETAMINOPHEN 650 MG: 325 TABLET ORAL at 20:03

## 2023-02-06 RX ADMIN — CEFTRIAXONE SODIUM 1000 MG: 1 INJECTION, POWDER, FOR SOLUTION INTRAMUSCULAR; INTRAVENOUS at 03:08

## 2023-02-06 RX ADMIN — AMOXICILLIN AND CLAVULANATE POTASSIUM 1 TABLET: 875; 125 TABLET, FILM COATED ORAL at 20:03

## 2023-02-06 RX ADMIN — SODIUM CHLORIDE, PRESERVATIVE FREE 10 ML: 5 INJECTION INTRAVENOUS at 08:44

## 2023-02-06 RX ADMIN — IBUPROFEN 200 MG: 200 TABLET, FILM COATED ORAL at 18:24

## 2023-02-06 RX ADMIN — ENOXAPARIN SODIUM 30 MG: 100 INJECTION SUBCUTANEOUS at 03:05

## 2023-02-06 RX ADMIN — SODIUM CHLORIDE, PRESERVATIVE FREE 10 ML: 5 INJECTION INTRAVENOUS at 20:03

## 2023-02-06 RX ADMIN — BENZONATATE 100 MG: 100 CAPSULE ORAL at 20:04

## 2023-02-06 RX ADMIN — ACETAMINOPHEN 650 MG: 325 TABLET ORAL at 04:18

## 2023-02-06 ASSESSMENT — PAIN DESCRIPTION - LOCATION
LOCATION: CHEST

## 2023-02-06 ASSESSMENT — PAIN - FUNCTIONAL ASSESSMENT
PAIN_FUNCTIONAL_ASSESSMENT: PREVENTS OR INTERFERES SOME ACTIVE ACTIVITIES AND ADLS
PAIN_FUNCTIONAL_ASSESSMENT: PREVENTS OR INTERFERES SOME ACTIVE ACTIVITIES AND ADLS
PAIN_FUNCTIONAL_ASSESSMENT: ACTIVITIES ARE NOT PREVENTED
PAIN_FUNCTIONAL_ASSESSMENT: PREVENTS OR INTERFERES SOME ACTIVE ACTIVITIES AND ADLS

## 2023-02-06 ASSESSMENT — PAIN SCALES - GENERAL
PAINLEVEL_OUTOF10: 3
PAINLEVEL_OUTOF10: 4
PAINLEVEL_OUTOF10: 7
PAINLEVEL_OUTOF10: 3
PAINLEVEL_OUTOF10: 3
PAINLEVEL_OUTOF10: 2
PAINLEVEL_OUTOF10: 0
PAINLEVEL_OUTOF10: 2
PAINLEVEL_OUTOF10: 0
PAINLEVEL_OUTOF10: 2

## 2023-02-06 ASSESSMENT — PAIN DESCRIPTION - PAIN TYPE
TYPE: ACUTE PAIN

## 2023-02-06 ASSESSMENT — PAIN DESCRIPTION - ORIENTATION
ORIENTATION: RIGHT

## 2023-02-06 ASSESSMENT — PAIN DESCRIPTION - DESCRIPTORS
DESCRIPTORS: ACHING;DISCOMFORT
DESCRIPTORS: ACHING
DESCRIPTORS: ACHING;DISCOMFORT
DESCRIPTORS: ACHING

## 2023-02-06 ASSESSMENT — PAIN DESCRIPTION - FREQUENCY
FREQUENCY: CONTINUOUS

## 2023-02-06 ASSESSMENT — PAIN DESCRIPTION - ONSET
ONSET: ON-GOING
ONSET: ON-GOING

## 2023-02-06 NOTE — PLAN OF CARE
Problem: Discharge Planning  Goal: Discharge to home or other facility with appropriate resources  Outcome: Progressing  Flowsheets (Taken 2/6/2023 0830)  Discharge to home or other facility with appropriate resources:   Identify barriers to discharge with patient and caregiver   Arrange for needed discharge resources and transportation as appropriate   Identify discharge learning needs (meds, wound care, etc)   Refer to discharge planning if patient needs post-hospital services based on physician order or complex needs related to functional status, cognitive ability or social support system     Problem: Pain  Goal: Verbalizes/displays adequate comfort level or baseline comfort level  Outcome: Progressing  Verbalizes/displays adequate comfort level or baseline comfort level:   Encourage patient to monitor pain and request assistance   Assess pain using appropriate pain scale   Administer analgesics based on type and severity of pain and evaluate response   Implement non-pharmacological measures as appropriate and evaluate response     Problem: Respiratory - Adult  Goal: Achieves optimal ventilation and oxygenation  Outcome: Progressing  Flowsheets (Taken 2/5/2023 2225 by Aung Merritt RN)  Achieves optimal ventilation and oxygenation:   Assess for changes in respiratory status   Assess for changes in mentation and behavior   Position to facilitate oxygenation and minimize respiratory effort   Oxygen supplementation based on oxygen saturation or arterial blood gases   Encourage broncho-pulmonary hygiene including cough, deep breathe, incentive spirometry   Assess the need for suctioning and aspirate as needed   Assess and instruct to report shortness of breath or any respiratory difficulty   Respiratory therapy support as indicated     Problem: Skin/Tissue Integrity - Adult  Goal: Skin integrity remains intact  Outcome: Progressing  Flowsheets  Taken 2/6/2023 1417  Skin Integrity Remains Intact:   Monitor for areas of redness and/or skin breakdown   Assess vascular access sites hourly  Taken 2/6/2023 0830  Skin Integrity Remains Intact:   Monitor for areas of redness and/or skin breakdown   Assess vascular access sites hourly     Problem: Infection - Adult  Goal: Absence of infection at discharge  Outcome: Progressing  Flowsheets (Taken 2/6/2023 0830)  Absence of infection at discharge:   Assess and monitor for signs and symptoms of infection   Monitor lab/diagnostic results   Monitor all insertion sites i.e., indwelling lines, tubes and drains  Goal: Absence of infection during hospitalization  Outcome: Progressing  Flowsheets (Taken 2/6/2023 0830)  Absence of infection during hospitalization:   Assess and monitor for signs and symptoms of infection   Monitor lab/diagnostic results     Problem: Safety - Adult  Goal: Free from fall injury  Outcome: Progressing  Free From Fall Injury: Instruct family/caregiver on patient safety     Problem: Chronic Conditions and Co-morbidities  Goal: Patient's chronic conditions and co-morbidity symptoms are monitored and maintained or improved  Outcome: Progressing  Flowsheets (Taken 2/6/2023 0830)  Care Plan - Patient's Chronic Conditions and Co-Morbidity Symptoms are Monitored and Maintained or Improved:   Monitor and assess patient's chronic conditions and comorbid symptoms for stability, deterioration, or improvement   Collaborate with multidisciplinary team to address chronic and comorbid conditions and prevent exacerbation or deterioration   Update acute care plan with appropriate goals if chronic or comorbid symptoms are exacerbated and prevent overall improvement and discharge     Problem: Metabolic/Fluid and Electrolytes - Adult  Goal: Electrolytes maintained within normal limits  Outcome: Progressing  Flowsheets (Taken 2/6/2023 0830)  Electrolytes maintained within normal limits:   Monitor labs and assess patient for signs and symptoms of electrolyte imbalances   Monitor response to electrolyte replacements, including repeat lab results as appropriate     Problem: Cardiovascular - Adult  Goal: Maintains optimal cardiac output and hemodynamic stability  Outcome: Progressing  Flowsheets (Taken 2/6/2023 1420)  Maintains optimal cardiac output and hemodynamic stability:   Monitor blood pressure and heart rate   Monitor urine output and notify Licensed Independent Practitioner for values outside of normal range   Care plan reviewed with patient. Patient verbalizes understanding of the plan of care and contributes to goal setting.

## 2023-02-06 NOTE — TELEPHONE ENCOUNTER
Raenelle Jeans called requesting a refill on the following medications:  Requested Prescriptions     Pending Prescriptions Disp Refills    ANDRODERM 4 MG/24HR PT24 patch [Pharmacy Med Name: ANDRODERM 4 MG/24HR PATCH] 60 patch 0     Sig: Place 8 mg onto the skin daily for 30 days. Two patches daily.      Pharmacy verified:  [email or web address]      Date of last visit: 08/03/2022  Date of next visit (if applicable): 6/02/1304

## 2023-02-06 NOTE — PROGRESS NOTES
CT/CV Surgery Progress Note    2023 8:13 AM  Surgeon:  Dr. Lan Perez     Subjective:  Mr. Jose Ortiz is resting comfortably on RA in chair, alert, and in no acute distress. Pt denies chest pressure, SOB, fever,chills, N/V/D. Chest tube with 450 mL past 24 hours. 4 treatments with TPA/dornase. Vital Signs: BP (!) 128/59   Pulse 82   Temp 97.8 °F (36.6 °C) (Oral)   Resp 18   Ht 5' 10\" (1.778 m)   Wt 286 lb 1.6 oz (129.8 kg)   SpO2 95%   BMI 41.05 kg/m²    Temp (24hrs), Av.2 °F (36.8 °C), Min:97.7 °F (36.5 °C), Max:99.4 °F (37.4 °C)       Labs:   CBC:  Recent Labs     23  0337   WBC 11.5*   HGB 11.5*   HCT 36.4*   MCV 85.4   *       BMP:   Recent Labs     23  0337      K 4.3      CO2 27   BUN 14   CREATININE 0.9       Imaging:  CXR: 2023  Findings:   Mediastinum: No cardiac silhouette enlargement. Lung and pleura: Stable right pleural effusion with mild improvement in    right basilar consolidation. Stable positioning of pigtail thoracostomy    tube over the central right lung base. Left lung clear. No pneumothorax. Bones: No acute pathology. Impression   Impression:   Stable right effusion with mild improvement in right basilar consolidation. This document has been electronically signed by: Yulia Monzon MD on    2023 04:13 AM       Intake/Output Summary (Last 24 hours) at 2023 0813  Last data filed at 2023 0458  Gross per 24 hour   Intake 499.59 ml   Output 450 ml   Net 49.59 ml       Scheduled Meds:    testosterone  2 patch Topical Q24H    sodium chloride flush  5-40 mL IntraVENous 2 times per day    enoxaparin  30 mg SubCUTAneous Q12H     ROS: All neg unless specifically mentioned in subjective section.      Exam:  General Appearance: alert ,conversing, in no acute distress  Cardiovascular: normal rate, regular rhythm, normal S1 and S2, no murmurs, rubs, clicks, or gallops  Pulmonary/Chest: decreased BS in right base  Neurological: alert, oriented, normal speech, no focal findings or movement disorder noted    Assessment:   Patient Active Problem List   Diagnosis    Hypogonadotropic hypogonadism syndrome, male ([facility] [address]    Hypogonadism in male    Mixed hyperlipidemia    Pneumonia due to organism    Acute pneumonia    Pleural effusion, right    Abnormal chest x-ray     Plan:   CXR reviewed, mild improvement- Continue daily CXR's  Continue medical therapy  CT chest results pending.   Further recommendations based on results and review by Dr. Shaylee Breen PA-C

## 2023-02-06 NOTE — PROGRESS NOTES
Progress note: Infectious diseases    Patient - Aime Delgadillo,  Age - 40 y.o.    - 1980      Room Number - 4K-04/004-A   MRN -  [id number]   [facility]  Date of Admission -  2023  5:45 PM    SUBJECTIVE:    He has no new complainst  CT scan shows improvement of the empyema but still has infiltrates  OBJECTIVE   VITALS    height is 5' 10\" (1.778 m) and weight is 286 lb 1.6 oz (129.8 kg). His oral temperature is 98 °F (36.7 °C). His blood pressure is 131/60 and his pulse is 87. His respiration is 18 and oxygen saturation is 96%. Wt Readings from Last 3 Encounters:   23 286 lb 1.6 oz (129.8 kg)   23 299 lb (135.6 kg)   22 289 lb (131.1 kg)       I/O (24 Hours)    Intake/Output Summary (Last 24 hours) at 2023 0848  Last data filed at 2023 0458  Gross per 24 hour   Intake 499.59 ml   Output 450 ml   Net 49.59 ml         General Appearance  Awake, alert, oriented,  obese. HEENT - normocephalic, atraumatic, pale  conjunctiva,  anicteric sclera.   Neck - Supple, no mass  Lungs -  Bilateral   air entry, right chest tube, diminished breath sound  Cardiovascular - Heart sounds are normal.     Abdomen - soft, not distended, nontender,   Neurologic -oriented  Skin - No bruising or bleeding  Extremities - No edema, no cyanosis, clubbing     MEDICATIONS:      testosterone  2 patch Topical Q24H    sodium chloride flush  5-40 mL IntraVENous 2 times per day    enoxaparin  30 mg SubCUTAneous Q12H      sodium chloride       benzonatate, HYDROcodone 5 mg - acetaminophen, ibuprofen, sodium chloride flush, sodium chloride, ondansetron **OR** ondansetron, polyethylene glycol, acetaminophen **OR** acetaminophen, ipratropium-albuterol      LABS:     CBC:   Recent Labs     23  0337   WBC 11.5*   HGB 11.5*   *       BMP:    Recent Labs     23      K 4.3      CO2 27 BUN 14   CREATININE 0.9   GLUCOSE 90       Calcium:  Recent Labs     02/06/23  0337   CALCIUM 8.2*         CULTURES:   UA:   No results for input(s): SPECGRAV, PHUR, COLORU, CLARITYU, MUCUS, PROTEINU, BLOODU, RBCUA, WBCUA, BACTERIA, NITRU, GLUCOSEU, BILIRUBINUR, UROBILINOGEN, KETUA, LABCAST, LABCASTTY, AMORPHOS in the last 72 hours. Invalid input(s): CRYSTALS    Micro:   Lab Results   Component Value Date/Time    [facility]  01/31/2023 04:53 AM     No growth 24 hours. No growth 48 hours.  No growth at 5 days        Problem list of patient:     Patient Active Problem List   Diagnosis Code    Hypogonadotropic hypogonadism syndrome, male ([facility] [address] E23.0    Hypogonadism in male E29.1    Mixed hyperlipidemia E78.2    Pneumonia due to organism J18.9    Acute pneumonia J18.9    Pleural effusion, right J90    Abnormal chest x-ray R93.89         ASSESSMENT/PLAN   Pneumonia with empyema: he has right side chest tube getting alteplase and dornase  Obesity  Will continue with oral augmentin (empyema has not completely resolved)  CT scan report noted         Magen Lyons MD, MD, FACP 2/6/2023 8:48 AM

## 2023-02-06 NOTE — PLAN OF CARE
Problem: Discharge Planning  Goal: Discharge to home or other facility with appropriate resources  2/5/2023 2225 by Luis Munoz RN  Outcome: Progressing  Flowsheets  Taken 2/5/2023 2225  Discharge to home or other facility with appropriate resources:   Identify barriers to discharge with patient and caregiver   Arrange for needed discharge resources and transportation as appropriate   Identify discharge learning needs (meds, wound care, etc)  Taken 2/5/2023 2047  Discharge to home or other facility with appropriate resources:   Identify barriers to discharge with patient and caregiver   Arrange for needed discharge resources and transportation as appropriate   Identify discharge learning needs (meds, wound care, etc)     Problem: Pain  Goal: Verbalizes/displays adequate comfort level or baseline comfort level  2/5/2023 2225 by Luis Munoz RN  Outcome: Progressing  Flowsheets  Taken 2/5/2023 2225  Verbalizes/displays adequate comfort level or baseline comfort level:   Encourage patient to monitor pain and request assistance   Assess pain using appropriate pain scale   Administer analgesics based on type and severity of pain and evaluate response   Implement non-pharmacological measures as appropriate and evaluate response  Taken 2/5/2023 2047  Verbalizes/displays adequate comfort level or baseline comfort level:   Encourage patient to monitor pain and request assistance   Assess pain using appropriate pain scale   Administer analgesics based on type and severity of pain and evaluate response   Implement non-pharmacological measures as appropriate and evaluate response     Problem: Respiratory - Adult  Goal: Achieves optimal ventilation and oxygenation  2/5/2023 2225 by Luis Munoz RN  Outcome: Progressing  Flowsheets  Taken 2/5/2023 2225  Achieves optimal ventilation and oxygenation:   Assess for changes in respiratory status   Assess for changes in mentation and behavior   Position to facilitate oxygenation and minimize respiratory effort   Oxygen supplementation based on oxygen saturation or arterial blood gases   Encourage broncho-pulmonary hygiene including cough, deep breathe, incentive spirometry   Assess the need for suctioning and aspirate as needed   Assess and instruct to report shortness of breath or any respiratory difficulty   Respiratory therapy support as indicated  Taken 2/5/2023 2047  Achieves optimal ventilation and oxygenation:   Assess for changes in respiratory status   Assess for changes in mentation and behavior   Position to facilitate oxygenation and minimize respiratory effort   Oxygen supplementation based on oxygen saturation or arterial blood gases   Encourage broncho-pulmonary hygiene including cough, deep breathe, incentive spirometry   Assess the need for suctioning and aspirate as needed   Assess and instruct to report shortness of breath or any respiratory difficulty   Respiratory therapy support as indicated     Problem: Skin/Tissue Integrity - Adult  Goal: Skin integrity remains intact  2/5/2023 2225 by Eduardo Castaneda RN  Outcome: Progressing  Flowsheets  Taken 2/5/2023 2225  Skin Integrity Remains Intact: Monitor for areas of redness and/or skin breakdown  Taken 2/5/2023 2047  Skin Integrity Remains Intact: Monitor for areas of redness and/or skin breakdown     Problem: Infection - Adult  Goal: Absence of infection at discharge  2/5/2023 2225 by Eduardo Castaneda RN  Outcome: Progressing  Flowsheets  Taken 2/5/2023 2225  Absence of infection at discharge:   Assess and monitor for signs and symptoms of infection   Monitor lab/diagnostic results   Monitor all insertion sites i.e., indwelling lines, tubes and drains   Administer medications as ordered   Identify and instruct in appropriate isolation precautions for identified infection/condition   Instruct and encourage patient and family to use good hand hygiene technique  Taken 2/5/2023 2047  Absence of infection at discharge:   Assess and monitor for signs and symptoms of infection   Monitor lab/diagnostic results   Monitor all insertion sites i.e., indwelling lines, tubes and drains   Administer medications as ordered   Instruct and encourage patient and family to use good hand hygiene technique   Identify and instruct in appropriate isolation precautions for identified infection/condition     Problem: Infection - Adult  Goal: Absence of infection during hospitalization  2/5/2023 2225 by Kalia Gramajo RN  Outcome: Progressing  Flowsheets  Taken 2/5/2023 2225  Absence of infection during hospitalization:   Assess and monitor for signs and symptoms of infection   Monitor lab/diagnostic results   Monitor all insertion sites i.e., indwelling lines, tubes and drains   Administer medications as ordered   Instruct and encourage patient and family to use good hand hygiene technique   Identify and instruct in appropriate isolation precautions for identified infection/condition  Taken 2/5/2023 2047  Absence of infection during hospitalization:   Assess and monitor for signs and symptoms of infection   Monitor lab/diagnostic results   Monitor all insertion sites i.e., indwelling lines, tubes and drains   Administer medications as ordered   Instruct and encourage patient and family to use good hand hygiene technique   Identify and instruct in appropriate isolation precautions for identified infection/condition     Problem: Safety - Adult  Goal: Free from fall injury  2/5/2023 2225 by Kalia Gramajo RN  Outcome: Progressing  Flowsheets (Taken 2/5/2023 2225)  Free From Fall Injury: Instruct family/caregiver on patient safety     Problem: Chronic Conditions and Co-morbidities  Goal: Patient's chronic conditions and co-morbidity symptoms are monitored and maintained or improved  Outcome: Progressing  Flowsheets (Taken 2/5/2023 2225)  Care Plan - Patient's Chronic Conditions and Co-Morbidity Symptoms are Monitored and Maintained or Improved:   Monitor and assess patient's chronic conditions and comorbid symptoms for stability, deterioration, or improvement   Collaborate with multidisciplinary team to address chronic and comorbid conditions and prevent exacerbation or deterioration   Update acute care plan with appropriate goals if chronic or comorbid symptoms are exacerbated and prevent overall improvement and discharge     Problem: Metabolic/Fluid and Electrolytes - Adult  Goal: Electrolytes maintained within normal limits  Outcome: Progressing  Flowsheets (Taken 2/5/2023 2225)  Electrolytes maintained within normal limits:   Monitor labs and assess patient for signs and symptoms of electrolyte imbalances   Administer electrolyte replacement as ordered     Problem: Discharge Planning  Goal: Discharge to home or other facility with appropriate resources  2/5/2023 2225 by Darrel Martínez RN  Outcome: Progressing  Flowsheets  Taken 2/5/2023 2225  Discharge to home or other facility with appropriate resources:   Identify barriers to discharge with patient and caregiver   Arrange for needed discharge resources and transportation as appropriate   Identify discharge learning needs (meds, wound care, etc)  Taken 2/5/2023 2047  Discharge to home or other facility with appropriate resources:   Identify barriers to discharge with patient and caregiver   Arrange for needed discharge resources and transportation as appropriate   Identify discharge learning needs (meds, wound care, etc)     Problem: Pain  Goal: Verbalizes/displays adequate comfort level or baseline comfort level  2/5/2023 2225 by Darrel Martínez RN  Outcome: Progressing  Flowsheets  Taken 2/5/2023 2225  Verbalizes/displays adequate comfort level or baseline comfort level:   Encourage patient to monitor pain and request assistance   Assess pain using appropriate pain scale   Administer analgesics based on type and severity of pain and evaluate response   Implement non-pharmacological measures as appropriate and evaluate response  Taken 2/5/2023 2047  Verbalizes/displays adequate comfort level or baseline comfort level:   Encourage patient to monitor pain and request assistance   Assess pain using appropriate pain scale   Administer analgesics based on type and severity of pain and evaluate response   Implement non-pharmacological measures as appropriate and evaluate response    Care plan reviewed with patient. Patient verbalized understanding of the plan of care and contributed to goal setting.

## 2023-02-06 NOTE — TELEPHONE ENCOUNTER
EKG at bedside   Kristy Palomino called requesting a refill on the following medications:  Requested Prescriptions     Pending Prescriptions Disp Refills    ANDRODERM 4 MG/24HR PT24 patch       Sig: Two patches daily. Pharmacy verified: [facility] [place]   . pv      Date of last visit: 8/03/2022  Date of next visit (if applicable): 0/35/9132

## 2023-02-06 NOTE — PROGRESS NOTES
CT/CV Surgery Procedure Note    2/6/2023 3:43 PM    Intrapleural tPA/Dornase administration on right side:     Date of Operation:2/6/23@ 3:43 PM  Surgeon/  :Dr. Tamika Richards PA-C  Anesthesia/Local : None  Operation:  Intrapleural tPA/Dornase administration at bed side   Indication for the procedure: Alteplase ( tPA) administration intrapleurally daily for right side loculated/ pleural effusion/ empyema/ fibrothorax via pigtail catheter  Consent: Verbal consent obtained from patient after explaining the benefits,risks and associated complications including but not limited to development of pneumothorax and hemothorax with requirement of chest tube placement. Estimated Blood Loss: None   Complications: None. Description of Procedure: The patient was kept in sitting position comfortable with arms resting on a support table. The patient was identified and the procedure verified as tPA/Dornase administration intrapleurally on right side side of chest. A time out was held and the above information confirmed by registered nurse taking care of patient. By using standered aseptic and sterile precautions, the right side side pigtail catheter was uncapped and a connection kit was used to access the catheter. 6mg of Alteplase ( tPA) mixed with 30ml of 0.9 sterile NS and Dornase 5 mg mixed with 30 ml of sterile water were easily instilled into the pleural space. The pigtail catheter stop clock was turned to the off position (clamped). Nurse instructed to rotate the patient 90 degrees every 15 minutes for duration of clamp time, which is 3 hours. Nurse instructed to open stop clock (unclamp) after 3 hour duration or earlier if symptoms of SOB, chest discomfort, or cough persist since administration of medications.      Patient tolerated procedure well     The plan of care was discussed in detail with Dr. Caryle Skelton     Electronically signed by Mine Barber PA-C on 2/6/2023 at 3:43 PM

## 2023-02-06 NOTE — PROGRESS NOTES
[facility] ICU STEPDOWN TELEMETRY 4K  Occupational Therapy  Daily Note  Time:   Time In: 1194  Time Out: 1434  Timed Code Treatment Minutes: 26 Minutes  Minutes: 26          Date: 2023  Patient Name: Kel Jean,   Gender: male      Room: [facility]04/004-A  MRN: [id number]  : 1980  (40 y.o.)  Referring Practitioner: Velma Polk MD  Diagnosis: Acute Pneumonia  Additional Pertinent Hx: The patient is a 40 y.o. male  who presents with SOB. Pt states he was treated in December for possible pneumonia. He took antibiotics and felt better for a few weeks. In mid January he went to ED for cough SOB and fever and workup was negative. He returned to his PCP yesterday for same symptoms, cough, fever, congestion, and was given IM Rocephin and Levaquin, upon review of CXR he was told to go to ED for further workup. In the emergency department he was saturating 89 to 90% on room air and was placed on 2 L supplemental oxygen. CXR-   Large right pleural effusion and suspected underlying airspace infiltrates. \"    Restrictions/Precautions:  Restrictions/Precautions: General Precautions, Fall Risk     SUBJECTIVE: Pt sitting in recliner upon arrival, pt agreeable to OT session, RN gave verbal approval for session     PAIN: 0/10:     Vitals: Oxygen: 94-98% on RA during functional mob  Heart Rate: 111-120 during activity    COGNITION: WFL    ADL:   Grooming: Modified Independent. Bathing: Modified Independent. Upper Extremity Dressing: Modified Independent. Leelee Parr BALANCE:  Standing Balance: Supervision. With no AE in preparation for functional mob    BED MOBILITY:  Not Tested    TRANSFERS:  Sit to Stand:  Supervision. From the recliner  Stand to Sit: Supervision. Back to the recliner    FUNCTIONAL MOBILITY:  Assistive Device: None  Assist Level:  Supervision.    Distance:  HH distances with no rest breaks this date, with vc's for PLB and deep breathing due to SOB     ASSESSMENT: Activity Tolerance:  Patient tolerance of  treatment: good. Discharge Recommendations: Home with assist as needed  Equipment Recommendations: Equipment Needed: No  Plan: Times Per Week: 2-3x  Times Per Day: Once a day  Current Treatment Recommendations: Balance training, Strengthening, Functional mobility training, Endurance training, Equipment evaluation, education, & procurement, Self-Care / ADL, Safety education & training, Patient/Caregiver education & training, Home management training    Patient Education  Patient Education: ADL's and Energy Conservation    Goals  Short Term Goals  Time Frame for Short Term Goals: Until discharge  Short Term Goal 1: Pt will complete BADL routine with EC techniques Indep to increase endurance in home environment. Short Term Goal 2: Pt will complete mobility to/from BR and HH distances with Indep while O2 sats remain above 90% to increase indep and endurance with all toileting. Additional Goals?: No    Following session, patient left in safe position with all fall risk precautions in place.

## 2023-02-06 NOTE — PROGRESS NOTES
IM Progress Note  Dr. Blandon Class  2/6/2023 12:22 PM      Patient name Kristy Palomino  [id number]  PCP: Khushboo Arciniega MD  Admit Date: 1/30/2023  Acct No. [de-identified]    Subjective: Interval History:   Up in chair  Feels much better  Breathing improved  CT d/w pt    Diet: ADULT DIET; Regular    I/O last 3 completed shifts: In: 1279.6 [P.O.:1170; I.V.:10; IV Piggyback:99.6]  Out: 460 [Chest Tube:460]  No intake/output data recorded. Admission weight: 297 lb (134.7 kg) as of 1/30/2023  5:45 PM  Wt Readings from Last 3 Encounters:   02/06/23 286 lb 1.6 oz (129.8 kg)   01/30/23 299 lb (135.6 kg)   08/03/22 289 lb (131.1 kg)     Body mass index is 41.05 kg/m². Medications:   Scheduled Meds:   amoxicillin-clavulanate  1 tablet Oral 2 times per day    testosterone  2 patch Topical Q24H    sodium chloride flush  5-40 mL IntraVENous 2 times per day    enoxaparin  30 mg SubCUTAneous Q12H     Continuous Infusions:   sodium chloride         Labs :     CBC:   Recent Labs     02/06/23  0337   WBC 11.5*   HGB 11.5*   *       BMP:    Recent Labs     02/06/23  0337      K 4.3      CO2 27   BUN 14   CREATININE 0.9   GLUCOSE 90       Hepatic:   No results for input(s): AST, ALT, ALB, BILITOT, ALKPHOS in the last 72 hours. Troponin: No results for input(s): TROPONINI in the last 72 hours. BNP: No results for input(s): BNP in the last 72 hours. Lipids: No results for input(s): CHOL, HDL in the last 72 hours. Invalid input(s): LDLCALCU  INR:   No results for input(s): INR in the last 72 hours.       Radiology    Objective:   Vitals: /70   Pulse 87   Temp 98 °F (36.7 °C) (Oral)   Resp 18   Ht 5' 10\" (1.778 m)   Wt 286 lb 1.6 oz (129.8 kg)   SpO2 96%   BMI 41.05 kg/m²   HEENT: Head:pupils react  Neck: supple  Lungs: improved air entr both sides CT in place  Heart: regular rate and rhythm   Abdomen: soft BS heard   Extremities: warm  no edema  Neurologic:  Alert, oriented X3    Impression:   :   S/p CT for Empyema   Leucocytosis improved  Sinus tachycardia  Hypogonadism  Excessive obesity    Plan:    CT noted to show improvement in effusion  Will await surgical input  Antibiotics per LEONARD Rockwell MD, MD

## 2023-02-06 NOTE — PROGRESS NOTES
[facility]  INPATIENT PHYSICAL THERAPY  DAILY NOTE  [facility] ICU STEPDOWN TELEMETRY 4K - 4K-04/004-A     Time In: 1136  Time Out: 1203  Timed Code Treatment Minutes: 27 Minutes  Minutes: 27          Date: 2023  Patient Name: Christos Jacobs,  Gender:  male        MRN: [id number]  : 1980  (40 y.o.)     Referring Practitioner: Demetrio Henderson MD  Diagnosis: Pleural effusion, right  Additional Pertinent Hx: This is a very pleasant 40 y.o. male who was admitted to the hospital with a chief complaints of cough and shortness of breath for three days duration. He says he recently had a pleural effusion in December with pneumonia that was treated, had temporary improvement, then he began to feel right sided pain once again this weekend along with above symptoms and had a fever of 102 F  night. He admitted to somewhat productive cough but has not been able to expel sputum. Admits to right sided chest and shoulder pain. Denies nausea, vomiting, hemoptysis, lower extremity pitting edema, and UTI symptoms. Denies recent travel and sick contacts. Is not up to date with Influenza and COVID vaccines. CXR from OSH one day ago significant for very large right sided pleural effusion and patient presented to the ED at [facility] and was admitted. He was febrile upon arrival with leukocytosis 24.1 and tachycardia. Procal elevated 1.41, lactate wnl. He was given Rocephin and Levaquin upon arrival in ED. Required 2L O2 in ED. Now on 6L O2 NC. No O2 at baseline. Continues to be tachycardic, now afebrile and patient admits to slight improvement since arrival. Has had three episodes of watery diarrhea since admission. Pt to have chest tube placement.      Prior Level of Function:  Lives With: Family (6 kids (8-14))  Type of Home: House  Home Layout: Two level  Home Access: Level entry   Bathroom Shower/Tub: Walk-in shower  Bathroom Toilet: Standard  Bathroom Accessibility: Accessible    ADL Assistance: Independent  Homemaking Assistance: Independent  Homemaking Responsibilities: Yes  Ambulation Assistance: Independent  Transfer Assistance: Independent  Active : Yes    Restrictions/Precautions:  Restrictions/Precautions: General Precautions, Fall Risk      SUBJECTIVE: Pt resting in recliner with family present and they were about to leave. Pt liked the idea of walking with them to the elevator. PAIN: none      Vitals: Vitals not assessed per clinical judgement, see nursing flowsheet    OBJECTIVE:  Bed Mobility:  Not Tested    Transfers:  Sit to Stand: Supervision, Stand By Assistance  Stand to Sit:Supervision    Ambulation:  Supervision, Stand By Assistance  Distance: 450 ft  Surface: Level Tile  Device:No Device  Gait Deviations:  Decreased Step Length Bilaterally and Decreased Gait Speed    Balance:  Static Sitting Balance:  Modified Independent  Dynamic Sitting Balance: Modified Independent  Static Standing Balance: Supervision, Stand By Assistance  Dynamic Standing Balance: Stand By Assistance    Exercise:  Patient was guided in 1 set(s) 10-12 reps of exercise to both lower extremities. Ankle pumps, Glut sets, Quad sets, Seated marches, Seated heel/toe raises, and Long arc quads. Exercises were completed for increased independence with functional mobility. Functional Outcome Measures: Completed  AM-PAC Inpatient Mobility without Stair Climbing Raw Score : 16  AM-PAC Inpatient without Stair Climbing T-Scale Score : 45.54    ASSESSMENT:  Assessment: Patient progressing toward established goals. Activity Tolerance:  Patient tolerance of  treatment: good.        Equipment Recommendations:Equipment Needed: No  Discharge Recommendations: Continue to assess pending progress and Home with Assist as Needed  Plan: Current Treatment Recommendations: Strengthening, Balance training, Gait training, Functional mobility training, Transfer training, Endurance training, Patient/Caregiver education & training, Therapeutic activities  General Plan:  (5x GM)    Patient Education  Patient Education: Plan of Care, Home Exercise Program    Goals:  Patient Goals : to go home  Short Term Goals  Time Frame for Short Term Goals: by discharge  Short Term Goal 1: Pt to transfer supine <--> sit mod I to enable pt to get in/out of bed. Short Term Goal 2: Pt to transfer sit <--> stand mod I for increased functional mobility. Short Term Goal 3: Pt to ambulate >250 feet without AD mod I for community re-entry. Long Term Goals  Time Frame for Long Term Goals : NA due to short length of stay. Following session, patient left in safe position with all fall risk precautions in place. Rajesh Hernandez.  Jorge Jaime, Opplansuzanne Badger 8

## 2023-02-07 ENCOUNTER — APPOINTMENT (OUTPATIENT)
Dept: GENERAL RADIOLOGY | Age: 43
DRG: 193 | End: 2023-02-07
Payer: COMMERCIAL

## 2023-02-07 VITALS
WEIGHT: 286.4 LBS | BODY MASS INDEX: 41 KG/M2 | RESPIRATION RATE: 18 BRPM | HEIGHT: 70 IN | TEMPERATURE: 98.6 F | HEART RATE: 96 BPM | DIASTOLIC BLOOD PRESSURE: 73 MMHG | SYSTOLIC BLOOD PRESSURE: 127 MMHG | OXYGEN SATURATION: 96 %

## 2023-02-07 PROCEDURE — 6370000000 HC RX 637 (ALT 250 FOR IP): Performed by: INTERNAL MEDICINE

## 2023-02-07 PROCEDURE — APPSS30 APP SPLIT SHARED TIME 16-30 MINUTES: Performed by: PHYSICIAN ASSISTANT

## 2023-02-07 PROCEDURE — 6360000002 HC RX W HCPCS: Performed by: INTERNAL MEDICINE

## 2023-02-07 PROCEDURE — 2580000003 HC RX 258: Performed by: INTERNAL MEDICINE

## 2023-02-07 PROCEDURE — 71046 X-RAY EXAM CHEST 2 VIEWS: CPT

## 2023-02-07 PROCEDURE — 71045 X-RAY EXAM CHEST 1 VIEW: CPT

## 2023-02-07 PROCEDURE — 97116 GAIT TRAINING THERAPY: CPT

## 2023-02-07 RX ORDER — AMOXICILLIN AND CLAVULANATE POTASSIUM 875; 125 MG/1; MG/1
1 TABLET, FILM COATED ORAL EVERY 12 HOURS SCHEDULED
Qty: 25 TABLET | Refills: 0 | Status: SHIPPED | OUTPATIENT
Start: 2023-02-07 | End: 2023-02-20

## 2023-02-07 RX ORDER — TESTOSTERONE 4 MG/D
PATCH TRANSDERMAL
Qty: 60 PATCH | Refills: 0 | OUTPATIENT
Start: 2023-02-07 | End: 2023-03-09

## 2023-02-07 RX ORDER — BENZONATATE 100 MG/1
100 CAPSULE ORAL 2 TIMES DAILY PRN
Qty: 14 CAPSULE | Refills: 0 | Status: SHIPPED | OUTPATIENT
Start: 2023-02-07 | End: 2023-02-14

## 2023-02-07 RX ADMIN — ENOXAPARIN SODIUM 30 MG: 100 INJECTION SUBCUTANEOUS at 03:12

## 2023-02-07 RX ADMIN — SODIUM CHLORIDE, PRESERVATIVE FREE 10 ML: 5 INJECTION INTRAVENOUS at 08:56

## 2023-02-07 RX ADMIN — IBUPROFEN 200 MG: 200 TABLET, FILM COATED ORAL at 03:12

## 2023-02-07 RX ADMIN — AMOXICILLIN AND CLAVULANATE POTASSIUM 1 TABLET: 875; 125 TABLET, FILM COATED ORAL at 08:48

## 2023-02-07 ASSESSMENT — PAIN SCALES - GENERAL
PAINLEVEL_OUTOF10: 1
PAINLEVEL_OUTOF10: 4
PAINLEVEL_OUTOF10: 2
PAINLEVEL_OUTOF10: 0

## 2023-02-07 ASSESSMENT — PAIN - FUNCTIONAL ASSESSMENT: PAIN_FUNCTIONAL_ASSESSMENT: PREVENTS OR INTERFERES SOME ACTIVE ACTIVITIES AND ADLS

## 2023-02-07 ASSESSMENT — PAIN DESCRIPTION - ORIENTATION: ORIENTATION: RIGHT

## 2023-02-07 ASSESSMENT — PAIN DESCRIPTION - FREQUENCY: FREQUENCY: CONTINUOUS

## 2023-02-07 ASSESSMENT — PAIN DESCRIPTION - ONSET: ONSET: ON-GOING

## 2023-02-07 ASSESSMENT — PAIN DESCRIPTION - DESCRIPTORS
DESCRIPTORS: ACHING;DISCOMFORT
DESCRIPTORS: DISCOMFORT

## 2023-02-07 ASSESSMENT — PAIN DESCRIPTION - LOCATION
LOCATION: BACK
LOCATION: CHEST

## 2023-02-07 ASSESSMENT — PAIN DESCRIPTION - PAIN TYPE: TYPE: ACUTE PAIN

## 2023-02-07 NOTE — PROGRESS NOTES
Report received from night shift nurse, Roselia Mcwilliams with  primary nurse Jaclyn Covarrubias   Introduced self to patient. Patient awake and in chair.  Call light within reach     Electronically signed by Onur Frankel on 2/7/2023 at [facility]       Head to Toe Assessment    Skin  General skin appearance / Description: warm, dry , skin color WNL  for  ethnicity   Incisions / Wounds: chest tube     Neuro/Head  LOC: A+O x 4  Eyes PERRLA 4mm -3mm reactive   Symmetry of face / tongue: symmetrical   JVD of neck: negative     Chest  Heart sounds / Apical Pulse: regular   Dyspnea: none   Lung sounds: clear  bilateral   Cough: none     Abdomen/ Pelvis  Bowel sounds: active in four quadrants   Passing flatus: yes  Palpation / Inspection: rounded soft   Last BM: 0850 02/07/2023  Stool assessment: n/a  Any GI issues: none  Urinary issues: n/a   Continence: yes   Urine color / turbidity: n/a     Extremities  Edema: BLE +1  Upper extremity push / pulls: strong bilateral   Left Arm Radial Pulse: present 2+ Capillary Refill: less than 3 seconds   Right Arm Radial Pulse: present 2+ Capillary Refill: less than 3 seconds   Lower extremity pedal push / pulls: bilateral strong   Left pedal pulse: present 2+   Left posterior tibialis pulse: present 2+   Left lower extremity capillary refill: less than 3 seconds   Right pedal pulse: present 2 +   Right posterior tibialis pulse: present 2+ Right lower extremity capillary refill: less than 3 seconds   Drift of extremities: negative   Pain: 1 , lower back pain , discomfort       Electronically signed by Onur Frankel on 2/7/2023 at 0750      No changes to prior head to toe assessment   Electronically signed by Onur Frankel on 2/7/2023 at 1116

## 2023-02-07 NOTE — PLAN OF CARE
Problem: Discharge Planning  Goal: Discharge to home or other facility with appropriate resources  2/7/2023 0857 by KENNETH Tony consult received and completed. See SW note.

## 2023-02-07 NOTE — CARE COORDINATION
2/7/23, 9:55 AM EST    Patient goals/plan/ treatment preferences discussed by  and . Patient goals/plan/ treatment preferences reviewed with patient/ family. Patient/ family verbalize understanding of discharge plan and are in agreement with goal/plan/treatment preferences. Understanding was demonstrated using the teach back method. AVS provided by RN at time of discharge, which includes all necessary medical information pertaining to the patients current course of illness, treatment, post-discharge goals of care, and treatment preferences. Services At/After Discharge: None    Denies needs and plans to discharge home with spouse.

## 2023-02-07 NOTE — PROGRESS NOTES
[facility] THERAPY MISSED TREATMENT NOTE  [facility] ICU STEPDOWN TELEMETRY 4K  4K-004-A      Date: 2023  Patient Name: Faviola Pendleton        CSN: [id number]   : 1980  (40 y.o.)  Gender: male   Referring Practitioner: Marisel Sullivan MD  Diagnosis: Acute Pneumonia         REASON FOR MISSED TREATMENT: Patient Refused. Pt was dressed and ready to be discharged. His nurse indicated that his discharge was being prepared. Pt stated that he hoped to return to workplace in 6 days. Encouraged pt to prepare for working by doing things each day to help get ready for what he will be doing physically. Pt verbalized understanding.

## 2023-02-07 NOTE — PLAN OF CARE
Problem: Discharge Planning  Goal: Discharge to home or other facility with appropriate resources  2/7/2023 0032 by Emily Whiting RN  Outcome: Progressing  Flowsheets  Taken 2/7/2023 0032  Discharge to home or other facility with appropriate resources:   Identify barriers to discharge with patient and caregiver   Arrange for needed discharge resources and transportation as appropriate   Identify discharge learning needs (meds, wound care, etc)  Taken 2/6/2023 2000  Discharge to home or other facility with appropriate resources:   Identify barriers to discharge with patient and caregiver   Arrange for needed discharge resources and transportation as appropriate   Identify discharge learning needs (meds, wound care, etc)     Problem: Pain  Goal: Verbalizes/displays adequate comfort level or baseline comfort level  2/7/2023 0032 by Emily Whiting RN  Outcome: Progressing  Flowsheets  Taken 2/7/2023 0032  Verbalizes/displays adequate comfort level or baseline comfort level:   Encourage patient to monitor pain and request assistance   Assess pain using appropriate pain scale   Administer analgesics based on type and severity of pain and evaluate response   Implement non-pharmacological measures as appropriate and evaluate response  Taken 2/6/2023 2000  Verbalizes/displays adequate comfort level or baseline comfort level:   Encourage patient to monitor pain and request assistance   Assess pain using appropriate pain scale   Administer analgesics based on type and severity of pain and evaluate response   Implement non-pharmacological measures as appropriate and evaluate response     Problem: Respiratory - Adult  Goal: Achieves optimal ventilation and oxygenation  2/7/2023 0032 by Emily Whiting RN  Outcome: Progressing  Flowsheets  Taken 2/7/2023 0032  Achieves optimal ventilation and oxygenation:   Assess for changes in respiratory status   Assess for changes in mentation and behavior   Position to facilitate oxygenation and minimize respiratory effort   Oxygen supplementation based on oxygen saturation or arterial blood gases   Encourage broncho-pulmonary hygiene including cough, deep breathe, incentive spirometry   Assess the need for suctioning and aspirate as needed   Assess and instruct to report shortness of breath or any respiratory difficulty   Respiratory therapy support as indicated  Taken 2/6/2023 2000  Achieves optimal ventilation and oxygenation:   Assess for changes in respiratory status   Assess for changes in mentation and behavior   Position to facilitate oxygenation and minimize respiratory effort   Oxygen supplementation based on oxygen saturation or arterial blood gases   Encourage broncho-pulmonary hygiene including cough, deep breathe, incentive spirometry   Assess the need for suctioning and aspirate as needed   Assess and instruct to report shortness of breath or any respiratory difficulty   Respiratory therapy support as indicated     Problem: Skin/Tissue Integrity - Adult  Goal: Skin integrity remains intact  2/7/2023 0032 by Kari Guerra RN  Outcome: Progressing  Flowsheets  Taken 2/7/2023 0032  Skin Integrity Remains Intact: Monitor for areas of redness and/or skin breakdown  Taken 2/6/2023 2000  Skin Integrity Remains Intact: Monitor for areas of redness and/or skin breakdown     Problem: Infection - Adult  Goal: Absence of infection at discharge  2/7/2023 0032 by Kari Guerra RN  Outcome: Progressing  Flowsheets  Taken 2/7/2023 0032  Absence of infection at discharge:   Assess and monitor for signs and symptoms of infection   Monitor lab/diagnostic results   Monitor all insertion sites i.e., indwelling lines, tubes and drains   Administer medications as ordered   Instruct and encourage patient and family to use good hand hygiene technique   Identify and instruct in appropriate isolation precautions for identified infection/condition  Taken 2/6/2023 2000  Absence of infection at discharge:   Assess and monitor for signs and symptoms of infection   Monitor lab/diagnostic results   Monitor all insertion sites i.e., indwelling lines, tubes and drains   Administer medications as ordered   Instruct and encourage patient and family to use good hand hygiene technique   Identify and instruct in appropriate isolation precautions for identified infection/condition     Problem: Infection - Adult  Goal: Absence of infection during hospitalization  2/7/2023 0032 by Ramona Pineda RN  Outcome: Progressing  Flowsheets  Taken 2/7/2023 0032  Absence of infection during hospitalization:   Assess and monitor for signs and symptoms of infection   Monitor lab/diagnostic results   Monitor all insertion sites i.e., indwelling lines, tubes and drains   Administer medications as ordered   Instruct and encourage patient and family to use good hand hygiene technique   Identify and instruct in appropriate isolation precautions for identified infection/condition  Taken 2/6/2023 2000  Absence of infection during hospitalization:   Assess and monitor for signs and symptoms of infection   Monitor lab/diagnostic results   Monitor all insertion sites i.e., indwelling lines, tubes and drains   Administer medications as ordered   Instruct and encourage patient and family to use good hand hygiene technique   Identify and instruct in appropriate isolation precautions for identified infection/condition     Problem: Safety - Adult  Goal: Free from fall injury  2/7/2023 0032 by Ramona Pineda RN  Outcome: Progressing  Flowsheets (Taken 2/7/2023 0032)  Free From Fall Injury: Instruct family/caregiver on patient safety     Problem: Chronic Conditions and Co-morbidities  Goal: Patient's chronic conditions and co-morbidity symptoms are monitored and maintained or improved  2/7/2023 0032 by Ramona Pineda RN  Outcome: Progressing  Flowsheets  Taken 2/7/2023 0032  Care Plan - Patient's Chronic Conditions and Co-Morbidity Symptoms are Monitored and Maintained or Improved:   Monitor and assess patient's chronic conditions and comorbid symptoms for stability, deterioration, or improvement   Collaborate with multidisciplinary team to address chronic and comorbid conditions and prevent exacerbation or deterioration   Update acute care plan with appropriate goals if chronic or comorbid symptoms are exacerbated and prevent overall improvement and discharge  Taken 2/6/2023 [address] - Patient's Chronic Conditions and Co-Morbidity Symptoms are Monitored and Maintained or Improved:   Monitor and assess patient's chronic conditions and comorbid symptoms for stability, deterioration, or improvement   Collaborate with multidisciplinary team to address chronic and comorbid conditions and prevent exacerbation or deterioration   Update acute care plan with appropriate goals if chronic or comorbid symptoms are exacerbated and prevent overall improvement and discharge     Problem: Metabolic/Fluid and Electrolytes - Adult  Goal: Electrolytes maintained within normal limits  2/7/2023 0032 by Jose M Meade RN  Outcome: Progressing  Flowsheets  Taken 2/7/2023 0032  Electrolytes maintained within normal limits: Monitor labs and assess patient for signs and symptoms of electrolyte imbalances  Taken 2/6/2023 2000  Electrolytes maintained within normal limits: Monitor labs and assess patient for signs and symptoms of electrolyte imbalances     Problem: Cardiovascular - Adult  Goal: Maintains optimal cardiac output and hemodynamic stability  2/7/2023 0032 by Jose M Meade RN  Outcome: Progressing  Flowsheets  Taken 2/7/2023 0032  Maintains optimal cardiac output and hemodynamic stability: Monitor blood pressure and heart rate  Taken 2/6/2023 2000  Maintains optimal cardiac output and hemodynamic stability:   Monitor blood pressure and heart rate   Monitor urine output and notify Licensed Independent Practitioner for values outside of normal range   Assess for signs of decreased cardiac output    Care plan reviewed with patient. Patient verbalized understanding of the plan of care and contributed to goal setting.

## 2023-02-07 NOTE — PROGRESS NOTES
[address] [address]  INPATIENT PHYSICAL THERAPY  DAILY NOTE  [facility] ICU STEPDOWN TELEMETRY 4K - 4K-004-A    Time In: 4864  Time Out: 4278  Timed Code Treatment Minutes: 9 Minutes  Minutes: 9          Date: 2023  Patient Name: Aime Delgadillo,  Gender:  male        MRN: [id number]  : 1980  (40 y.o.)     Referring Practitioner: Reema Torres MD  Diagnosis: Pleural effusion, right  Additional Pertinent Hx: This is a very pleasant 40 y.o. male who was admitted to the hospital with a chief complaints of cough and shortness of breath for three days duration. He says he recently had a pleural effusion in December with pneumonia that was treated, had temporary improvement, then he began to feel right sided pain once again this weekend along with above symptoms and had a fever of 102 F  night. He admitted to somewhat productive cough but has not been able to expel sputum. Admits to right sided chest and shoulder pain. Denies nausea, vomiting, hemoptysis, lower extremity pitting edema, and UTI symptoms. Denies recent travel and sick contacts. Is not up to date with Influenza and COVID vaccines. CXR from OSH one day ago significant for very large right sided pleural effusion and patient presented to the ED at [facility] and was admitted. He was febrile upon arrival with leukocytosis 24.1 and tachycardia. Procal elevated 1.41, lactate wnl. He was given Rocephin and Levaquin upon arrival in ED. Required 2L O2 in ED. Now on 6L O2 NC. No O2 at baseline. Continues to be tachycardic, now afebrile and patient admits to slight improvement since arrival. Has had three episodes of watery diarrhea since admission. Pt to have chest tube placement.      Prior Level of Function:  Lives With: Family (6 kids (8-14))  Type of Home: House  Home Layout: Two level  Home Access: Level entry   Bathroom Shower/Tub: Walk-in shower  Bathroom Toilet: Standard  Bathroom Accessibility: Accessible    ADL Assistance: Independent  Homemaking Assistance: Independent  Homemaking Responsibilities: Yes  Ambulation Assistance: Independent  Transfer Assistance: Independent  Active : Yes    Restrictions/Precautions:  Restrictions/Precautions: General Precautions, Fall Risk     SUBJECTIVE: RN approved session, pt is seated in recliner, agreeable to PT. PAIN: denies    Vitals: Vitals not assessed per clinical judgement, see nursing flowsheet    OBJECTIVE:  Bed Mobility:  Not Tested    Transfers:  Sit to Stand: Independent  Stand to Sit:Independent    Ambulation:  Independent  Distance: 300 feet  Surface: Level Tile  Device:No Device  Gait Deviations:  Decreased Step Length Bilaterally, Decreased Weight Shift Bilaterally, and Decreased Heel Strike Bilaterally  **Min SOB on room air    Functional Outcome Measures: Not completed     ASSESSMENT:  Assessment: Patient progressing toward established goals. Pt has met all short term goals, hopeful to DC in the next 1-2 days. Education on ambulating in hallway with staff. PT will sign off. Activity Tolerance:  Patient tolerance of  treatment: good. Equipment Recommendations:Equipment Needed: No  Discharge Recommendations: Home with Assist as Needed  General Plan:  (NA- DC 2/7)    Patient Education  Patient Education: Transfers, Gait    Goals:  Patient Goals : to go home  Short Term Goals  Time Frame for Short Term Goals: by discharge  Short Term Goal 1: Pt to transfer supine <--> sit mod I to enable pt to get in/out of bed. - GOAL MET  Short Term Goal 2: Pt to transfer sit <--> stand mod I for increased functional mobility.- GOAL MET  Short Term Goal 3: Pt to ambulate >250 feet without AD mod I for community re-entry. - GOAL MET  Long Term Goals  Time Frame for Long Term Goals : NA due to short length of stay. Following session, patient left in safe position with all fall risk precautions in place.

## 2023-02-07 NOTE — PROGRESS NOTES
CT/CV Surgery Progress Note    2023 7:20 AM  Surgeon:  Dr. Marge Davis     Subjective:  Mr. Lawrence Whitney is resting comfortably on RA in chair, alert, and in no acute distress. Pt denies chest pressure, SOB, fever,chills, N/V/D. Chest tube with 360 mL past 24 hours. Vital Signs: /63   Pulse 94   Temp 98.3 °F (36.8 °C) (Oral)   Resp 18   Ht 5' 10\" (1.778 m)   Wt 286 lb 6.4 oz (129.9 kg)   SpO2 99%   BMI 41.09 kg/m²    Temp (24hrs), Av.2 °F (36.8 °C), Min:98 °F (36.7 °C), Max:98.9 °F (37.2 °C)       Labs:   CBC:  Recent Labs     23  0337   WBC 11.5*   HGB 11.5*   HCT 36.4*   MCV 85.4   *       BMP:   Recent Labs     23  0337      K 4.3      CO2 27   BUN 14   CREATININE 0.9       Imaging:  CXR: 2023  Findings:       Right chest tube is unchanged. Slight increased right basilar consolidation. Persistent right pleural effusion also noted. Left lung clear. Mild cardiomegaly. Impression   Impression:       Slight worsening right basilar consolidation with persistent pleural    effusion       This document has been electronically signed by: Governor MD Evangelina on    2023 03:13 AM       CT chest: 23  FINDINGS:       There is a right-sided chest tube in place. There has been almost complete resolution of the right pleural effusion since previous study dated 2023. There is a small cavitary lesion in the right pleural space posteriorly measuring 3.9 x 4 cm in size. There is continued infiltrate in the right middle and lower lobes since previous study. The left lung is relatively unremarkable. The patient is status post cholecystectomy. There is a slightly atrophic pancreas. There is borderline cardiomegaly. . The aorta is of normal caliber. There is no gross abnormality of the pulmonary artery. There are enlarged lymph nodes mainly in the right paratracheal space and right hilum. There is no pericardial  effusion.             There is mild thoracic spondylosis. .               Impression       1. Right-sided chest tube in place. Almost complete resolution of the right pleural effusion since previous study dated 2/1/2023.   2. Small cavitary lesion in the right pleural space posteriorly measuring 3.9 x 4 cm in size. 3. Infiltrate in the right middle lower lobes smaller than on previous study dated 2/1/2023.   4. Borderline cardiomegaly. 5. Enlarged lymph nodes especially in the right paratracheal space and right hilum, unchanged. 6. Mild thoracic spondylosis. **This report has been created using voice recognition software. It may contain minor errors which are inherent in voice recognition technology. **       Final report electronically signed by DR Aliyn Willson on 2/6/2023 8:15 AM       Intake/Output Summary (Last 24 hours) at 2/7/2023 0720  Last data filed at 2/7/2023 0310  Gross per 24 hour   Intake 300 ml   Output 360 ml   Net -60 ml       Scheduled Meds:    amoxicillin-clavulanate  1 tablet Oral 2 times per day    testosterone  2 patch Topical Q24H    sodium chloride flush  5-40 mL IntraVENous 2 times per day    enoxaparin  30 mg SubCUTAneous Q12H     ROS: All neg unless specifically mentioned in subjective section. Exam:  General Appearance: alert ,conversing, in no acute distress  Cardiovascular: normal rate, regular rhythm, normal S1 and S2, no murmurs, rubs, clicks, or gallops  Pulmonary/Chest: Mild decreased BS in right base  Neurological: alert, oriented, normal speech, no focal findings or movement disorder noted    Assessment:   Patient Active Problem List   Diagnosis    Hypogonadotropic hypogonadism syndrome, male ([facility] [address]    Hypogonadism in male    Mixed hyperlipidemia    Pneumonia due to organism    Acute pneumonia    Pleural effusion, right    Abnormal chest x-ray     Plan:   CXR and CT reviewed, noted improvement especially on CT. Continue medical therapy  Removal Pigtail catheter today.   CT surgery signing off. Follow up prn. Clif Tyler PA-C      CAT scan shows significant improvement with resolution of pleural effusion and reexpansion of lung. Still some residual fluid but minimal and will resolve over time. No need for thoracotomy decortication  Remove chest tube and continue medical therapy  We will sign off.

## 2023-02-07 NOTE — PROGRESS NOTES
IM Progress Note  Dr. Laura Point  2/7/2023 1:05 PM      Patient name Viv Wilkins  [id number]  PCP: Samantha Ramos MD  Admit Date: 1/30/2023  Acct No. [de-identified]    Subjective: Interval History:   CT removed  Breathing stable      Diet: ADULT DIET; Regular    I/O last 3 completed shifts: In: 498.4 [P.O.:450; IV Piggyback:48.4]  Out: 410 [Chest Tube:410]  No intake/output data recorded. Admission weight: 297 lb (134.7 kg) as of 1/30/2023  5:45 PM  Wt Readings from Last 3 Encounters:   02/07/23 286 lb 6.4 oz (129.9 kg)   01/30/23 299 lb (135.6 kg)   08/03/22 289 lb (131.1 kg)     Body mass index is 41.09 kg/m². Medications:   Scheduled Meds:   amoxicillin-clavulanate  1 tablet Oral 2 times per day    testosterone  2 patch Topical Q24H    sodium chloride flush  5-40 mL IntraVENous 2 times per day    enoxaparin  30 mg SubCUTAneous Q12H     Continuous Infusions:   sodium chloride         Labs :     CBC:   Recent Labs     02/06/23  0337   WBC 11.5*   HGB 11.5*   *       BMP:    Recent Labs     02/06/23  0337      K 4.3      CO2 27   BUN 14   CREATININE 0.9   GLUCOSE 90       Hepatic:   No results for input(s): AST, ALT, ALB, BILITOT, ALKPHOS in the last 72 hours. Troponin: No results for input(s): TROPONINI in the last 72 hours. BNP: No results for input(s): BNP in the last 72 hours. Lipids: No results for input(s): CHOL, HDL in the last 72 hours. Invalid input(s): LDLCALCU  INR:   No results for input(s): INR in the last 72 hours.       Radiology    Objective:   Vitals: /73   Pulse 96   Temp 98.6 °F (37 °C) (Oral)   Resp 18   Ht 5' 10\" (1.778 m)   Wt 286 lb 6.4 oz (129.9 kg)   SpO2 96%   BMI 41.09 kg/m²   HEENT: Head:pupils react  Neck: supple  Lungs: improved air entr both sides   Heart: regular rate and rhythm   Abdomen: soft BS heard   Extremities: warm  no edema  Neurologic:  Alert, oriented X3    Impression:   :   S/p CT for Empyema   Leucocytosis improved  Sinus tachycardia  Hypogonadism  Excessive obesity    Plan:    If [facility] discharge pt home on Augmentin      Quan Ames MD, MD none at this time

## 2023-02-07 NOTE — PROGRESS NOTES
Virtual RN discussed AVS with patient. Patient education provided on new medication, antibiotic compliance, side effects, follow ups, and when to call the provider or return to the ED. Patient verbalized understanding.

## 2023-02-07 NOTE — PROGRESS NOTES
02/07/23 1155   Encounter Summary   Encounter Overview/Reason  Spiritual/Emotional Needs   Service Provided For: Patient   Referral/Consult From: Rounding   Last Encounter  02/07/23   Complexity of Encounter Moderate   Begin Time 1145   End Time  1155   Total Time Calculated 10 min   Encounter    Type Follow up   Spiritual/Emotional needs   Type Spiritual Support   Assessment/Intervention/Outcome   Assessment Calm   Intervention Sustaining Presence/Ministry of presence;Prayer (assurance of)/[place];Nurtured Hope   Outcome Coping   Assessment: In my encounter with the 40 yr old patient, while rounding  the unit 4K,  I provided spiritual care to patient through conversation, I also came to assess the patient's spiritual needs present. The pt was admitted due to acute pneumonia. Interventions:  I provided prayer, emotional support and words of comfort.  provided a listening presence and encouraged pt to share their beliefs and how they support them during their hospitalization. Outcomes: The patient was encouraged and didn't share any further spiritual needs at this time. Plan:  Chaplains will follow-up at a later time for assessment of any spiritual care needs present.

## 2023-02-07 NOTE — PROGRESS NOTES
Reported off the floor to primary nurse Geovanny Lockett     Electronically signed by Eusebio Wilkes on 2/7/2023 at 2:30 PM [facility]

## 2023-02-07 NOTE — PROGRESS NOTES
Progress note: Infectious diseases    Patient - Mame Martinez,  Age - 40 y.o.    - 1980      Room Number - 4K-04/004-A   MRN -  [id number]   Acct # - [de-identified]  Date of Admission -  2023  5:45 PM    SUBJECTIVE:    Chest tube was removed. he has no new complaints. He wants to go home. OBJECTIVE   VITALS    height is 5' 10\" (1.778 m) and weight is 286 lb 6.4 oz (129.9 kg). His oral temperature is 98.6 °F (37 °C). His blood pressure is 127/73 and his pulse is 96. His respiration is 18 and oxygen saturation is 96%. Wt Readings from Last 3 Encounters:   23 286 lb 6.4 oz (129.9 kg)   23 299 lb (135.6 kg)   22 289 lb (131.1 kg)       I/O (24 Hours)    Intake/Output Summary (Last 24 hours) at 2023 1253  Last data filed at 2023 0310  Gross per 24 hour   Intake 300 ml   Output 360 ml   Net -60 ml         General Appearance  Awake, alert, oriented,  obese. HEENT - normocephalic, atraumatic, pale  conjunctiva,  anicteric sclera.   Neck - Supple, no mass  Lungs -  Bilateral   air entry,    Cardiovascular - Heart sounds are normal.     Abdomen - soft, not distended, nontender,   Neurologic -oriented  Skin - No bruising or bleeding  Extremities - No edema, no cyanosis, clubbing     MEDICATIONS:      amoxicillin-clavulanate  1 tablet Oral 2 times per day    testosterone  2 patch Topical Q24H    sodium chloride flush  5-40 mL IntraVENous 2 times per day    enoxaparin  30 mg SubCUTAneous Q12H      sodium chloride       benzonatate, HYDROcodone 5 mg - acetaminophen, ibuprofen, sodium chloride flush, sodium chloride, ondansetron **OR** ondansetron, polyethylene glycol, acetaminophen **OR** acetaminophen, ipratropium-albuterol      LABS:     CBC:   Recent Labs     23  0337   WBC 11.5*   HGB 11.5*   *       BMP:    Recent Labs     23  0337      K 4.3      CO2 27   BUN 14 CREATININE 0.9   GLUCOSE 90       Calcium:  Recent Labs     02/06/23  0337   CALCIUM 8.2*             Problem list of patient:     Patient Active Problem List   Diagnosis Code    Hypogonad[facility]adism syndrome, male ([facility] E23.0    Hypogonadism in male E29.1    Mixed hyperlipidemia E78.2    Pneumonia due to organism J18.9    Acute pneumonia J18.9    Pleural effusion, right J90    Abnormal chest x-ray R93.89         ASSESSMENT/PLAN   Pneumonia with empyema: improved.  Chest tube was removed  Obesity  Continue oral augmentin for one wk  Discharge plan       Zak Payton MD, MD, [address] 2/7/2023 12:53 PM

## 2023-02-07 NOTE — DISCHARGE SUMMARY
Discharge Summary  2/7/2023  5:06 PM    Patient:  Ida Curiel  YOB: 1980    MRN: [id number]   Acct: [de-identified]   4K-04/004-A   Primary Care Physician: Carmelina Herman MD    Admit date:  1/30/2023    Discharge date:  2/7/2023    Discharge Diagnoses:    Patient Active Problem List   Diagnosis    Hypogonadotropic hypogonadism syndrome, male [facility])    Hypogonadism in male    Mixed hyperlipidemia    Pneumonia due to organism    Acute pneumonia    Pleural effusion, right    Abnormal chest x-ray       Discharge Medications:        Medication List        START taking these medications      amoxicillin-clavulanate 875-125 MG per tablet  Commonly known as: AUGMENTIN  Take 1 tablet by mouth every 12 hours for 25 doses     benzonatate 100 MG capsule  Commonly known as: TESSALON  Take 1 capsule by mouth 2 times daily as needed for Cough            CONTINUE taking these medications      ALLEGRA PO     Androderm 4 MG/24HR Pt24 patch  Generic drug: testosterone  Place 8 mg onto the skin daily for 30 days. Two patches daily.             STOP taking these medications      levoFLOXacin 750 MG tablet  Commonly known as: Levaquin            ASK your doctor about these medications      ibuprofen 200 MG tablet  Commonly known as: ADVIL;MOTRIN               Where to Get Your Medications        These medications were sent to [facility], [address] [place] [address] [phone]  [address] [place] [address]      Phone: [phone]   amoxicillin-clavulanate 875-125 MG per tablet  benzonatate 100 MG capsule        Scheduled Meds: Scheduled Meds:   amoxicillin-clavulanate  1 tablet Oral 2 times per day    testosterone  2 patch Topical Q24H    sodium chloride flush  5-40 mL IntraVENous 2 times per day    enoxaparin  30 mg SubCUTAneous Q12H     Continuous Infusions:   sodium chloride       PRN Meds:.benzonatate, HYDROcodone 5 mg - acetaminophen, ibuprofen, sodium chloride flush, sodium chloride, ondansetron **OR** ondansetron, polyethylene glycol, acetaminophen **OR** acetaminophen, ipratropium-albuterol  Continuous Infusions:   sodium chloride         Intake/Output Summary (Last 24 hours) at 2/7/2023 1706  Last data filed at 2/7/2023 0310  Gross per 24 hour   Intake 300 ml   Output 360 ml   Net -60 ml       Diet:  ADULT DIET;  Regular    Activity:  Activity as tolerated (Patient may move about with assist as indicated or with supervision.)    Follow-up:  in 1 weeks with Boyd Bynum MD,  in the next few weeks sleep study    Consultants:  pulmonary, cardiothoracic     Procedures:  thoracentesis, chest tube placement    Diagnostic Test:  CXR  Objective:  Lab Results   Component Value Date/Time    WBC 11.5 02/06/2023 03:37 AM    RBC 4.26 02/06/2023 03:37 AM    RBC 5.09 07/19/2017 09:55 AM    HGB 11.5 02/06/2023 03:37 AM    HCT 36.4 02/06/2023 03:37 AM    MCV 85.4 02/06/2023 03:37 AM    MCH 27.0 02/06/2023 03:37 AM    MCHC 31.6 02/06/2023 03:37 AM    RDW 12.7 01/30/2023 04:02 PM     02/06/2023 03:37 AM    MPV 8.7 02/06/2023 03:37 AM     Lab Results   Component Value Date/Time     02/06/2023 03:37 AM    K 4.3 02/06/2023 03:37 AM    K 3.8 01/31/2023 04:53 AM     02/06/2023 03:37 AM    CO2 27 02/06/2023 03:37 AM    BUN 14 02/06/2023 03:37 AM    CREATININE 0.9 02/06/2023 03:37 AM    GLUCOSE 90 02/06/2023 03:37 AM    GLUCOSE 82 07/19/2017 09:55 AM    CALCIUM 8.2 02/06/2023 03:37 AM     Lab Results   Component Value Date/Time    CALCIUM 8.2 02/06/2023 03:37 AM     No results found for: IONCA  No results found for: MG  No results found for: PHOS  No results found for: BNP  Lab Results   Component Value Date/Time    ALKPHOS 77 01/30/2023 06:00 PM    ALT 20 01/30/2023 06:00 PM    AST 12 01/30/2023 06:00 PM    PROT 6.5 01/31/2023 09:00 AM    PROT 7.3 10/16/2013 12:00 AM    BILITOT 1.5 01/30/2023 06:00 PM    BILIDIR 0.5 01/30/2023 06:00 PM    LABALBU 3.8 01/31/2023 09:00 AM     Lab Results   Component Value Date/Time    LACTA 1.7 01/31/2023 04:53 AM     No results found for: AMYLASE  Lab Results   Component Value Date/Time    LIPASE 19.5 01/30/2023 06:00 PM     Lab Results   Component Value Date/Time    CHOL 175 07/19/2017 09:55 AM    TRIG 381 07/19/2017 09:55 AM    HDL 33 07/19/2017 09:55 AM    LDLCALC 66 07/19/2017 09:55 AM     No results for input(s): POCGLU in the last 72 hours. No results for input(s): CKTOTAL, CKMB, TROPONINI in the last 72 hours. No results found for: LABA1C  Lab Results   Component Value Date/Time    INR 1.42 01/31/2023 09:00 AM     No results found for: PHART, PO2ART, XDC0JWL, [id number], [facility] Course: clinical course has improved,    The patient is a 40 y.o. male  who presents with SOB. Pt states he was treated in December for possible pneumonia. He took antibiotics and felt better for a few weeks. In mid January he went to ED for cough SOB and fever and workup was negative. He returned to his PCP yesterday for same symptoms, cough, fever, congestion, and was given IM Rocephin and Levaquin, upon review of CXR he was told to go to ED for further workup. In the emergency department he was saturating 89 to 90% on room air and was placed on 2 L supplemental oxygen. CXR-   Large right pleural effusion and suspected underlying airspace infiltrates. EKG- Sinus tachycardia  Incomplete right bundle branch block  Borderline ECG  When compared with ECG of 04-JAN-2019 11:54,  No significant change was found     Trop BNP negative, WBC 24.1, Segs 20.5, pro adelita 1.41    CT 2/1/23-   1. Moderate to large loculated right pleural effusion with a large area of consolidation involving the right middle lobe and right lower lobe. Circumferential right pleural thickening, right perihilar thickening, and prominent mediastinal and hilar lymph    nodes are associated. Differential considerations include airspace infection/inflammation however neoplasm is not excluded.        2. Chronic findings are discussed. CT 2/6/23-   1. Right-sided chest tube in place. Almost complete resolution of the right pleural effusion since previous study dated 2/1/2023.   2. Small cavitary lesion in the right pleural space posteriorly measuring 3.9 x 4 cm in size. 3. Infiltrate in the right middle lower lobes smaller than on previous study dated 2/1/2023.   4. Borderline cardiomegaly. 5. Enlarged lymph nodes especially in the right paratracheal space and right hilum, unchanged. 6. Mild thoracic spondylosis. CXR 2/7/23-   Impression   1. Borderline heart size. 2. Moderate right basilar atelectasis/pneumonia. Small pleural effusion right side. The pigtail catheter right side of chest has been removed. No pneumothorax. 3. Overall appearance of chest slightly improved from earlier. Thoracentesis fluid analysis results. Performed on: 31 January 2023  Side: right side of chest .  By IR: Yes  Amount of pleural fluid drained: 1.8 L    Chest tube placement 2/1/23 with Intrapleural tPA/Dornase administration x 4    Pt weaned off oxygen and was treated with Rocephin and Zithromax during admission.      Vitals: /73   Pulse 96   Temp 98.6 °F (37 °C) (Oral)   Resp 18   Ht 5' 10\" (1.778 m)   Wt 286 lb 6.4 oz (129.9 kg)   SpO2 96%   BMI 41.09 kg/m²   Physical Exam: per previous assessment note Dr. Filiberto Mcclain  HEENT: Head:pupils react  Neck: supple  Lungs: improved air entr both sides   Heart: regular rate and rhythm   Abdomen: soft BS heard   Extremities: warm  no edema  Neurologic:  Alert, oriented X3      Disposition: home    Condition: Stable    Over 35 minutes spent on encounter      EFREN Del Real CNP, CNP     Assessment and plan of care discussed with supervising physician, Dr Filiberto Mcclain.        Copy: Primary Care Physician: Michelle John MD  Internal Medicine    Informed repeat CXR improved   Pt discharged to f/u with family physician   Complete oral antibiotics    Electronically signed by Tyra Baum MD on 2/7/2023 at 8:40 PM

## 2023-02-07 NOTE — CARE COORDINATION
DISCHARGE PLANNING EVALUATION  2/7/23, 8:54 AM EST    Reason for Referral: Discharge planning  Mental Status: Alert and Oriented   Decision Making: Self  Family/Social/Home Environment: Patient lives with his spouse. He reported that he has supportive family and friends. Current Services including food security, transportation and housekeeping:  Patient reported that he was independent prior to admission. He reported that he did not have any current services prior to admission. Current Equipment: None  Payment Source: [email or web address]  Concerns or Barriers to Discharge: None  Post-acute [facility]) provider list was provided to patient. Patient was informed of their freedom to choose [facility] provider. Discussed and offered to show the patient the relevant [facility] Providers quality and resource use measures on Medicare Compare web site via computer based on patient's goals of care and treatment preferences. Questions regarding selection process were answered. Teach Back Method used with patient regarding care plan and needs. Patient verbalize understanding of the plan of care and contribute to goal setting. Patient goals, treatment preferences and discharge plan: Patient plans to return home at discharge. Spouse to transport at discharge. SW offered [facility] and patient declined. No additional needs identified.     Electronically signed by KENNETH Pickard on 2/7/2023 at 8:54 AM

## 2023-02-07 NOTE — PLAN OF CARE
Problem: Discharge Planning  Goal: Discharge to home or other facility with appropriate resources  2/7/2023 1714 by Garima Frye RN  Outcome: Completed     Problem: Pain  Goal: Verbalizes/displays adequate comfort level or baseline comfort level  Outcome: Completed     Problem: Respiratory - Adult  Goal: Achieves optimal ventilation and oxygenation  Outcome: Completed     Problem: Skin/Tissue Integrity - Adult  Goal: Skin integrity remains intact  Outcome: Completed     Problem: Infection - Adult  Goal: Absence of infection at discharge  Outcome: Completed     Problem: Infection - Adult  Goal: Absence of infection during hospitalization  Outcome: Completed     Problem: Safety - Adult  Goal: Free from fall injury  Outcome: Completed     Problem: Chronic Conditions and Co-morbidities  Goal: Patient's chronic conditions and co-morbidity symptoms are monitored and maintained or improved  Outcome: Completed     Problem: Metabolic/Fluid and Electrolytes - Adult  Goal: Electrolytes maintained within normal limits  Outcome: Completed     Problem: Cardiovascular - Adult  Goal: Maintains optimal cardiac output and hemodynamic stability  Outcome: Completed

## 2023-02-08 ENCOUNTER — TELEPHONE (OUTPATIENT)
Dept: FAMILY MEDICINE CLINIC | Age: 43
End: 2023-02-08

## 2023-02-08 NOTE — TELEPHONE ENCOUNTER
Care Transitions Initial Follow Up Call    Outreach made within 2 business days of discharge: Yes    Patient: Ida Curiel Patient : 1980   MRN: [id number]  Reason for Admission: There are no discharge diagnoses documented for the most recent discharge. Discharge Date: 23       Spoke with: no answer, no voicemail set up    Discharge department/facility:     [facility] Interactive Patient Contact:  Was patient able to fill all prescriptions:     Was patient instructed to bring all medications to the follow-up visit:   Is patient taking all medications as directed in the discharge summary?    Does patient understand their discharge instructions:   Does patient have questions or concerns that need addressed prior to 7-14 day follow up office visit:     Scheduled appointment with PCP within 7-14 days    Follow Up  Future Appointments   Date Time Provider Glory Serna   2/15/2023  8:00 AM Nacho Cohen MD [address] [place] [address]   2023  8:15 PM SCHEDULE, [facility] [address]   2023  9:40 AM EFREN Sigala - [address] [place] [address] Attending Attestation (For Attendings USE Only)...

## 2023-02-10 ENCOUNTER — NURSE ONLY (OUTPATIENT)
Dept: LAB | Age: 43
End: 2023-02-10

## 2023-02-10 DIAGNOSIS — E29.1 HYPOGONADISM IN MALE: ICD-10-CM

## 2023-02-10 LAB
DEPRECATED RDW RBC AUTO: 39.5 FL (ref 35–45)
ERYTHROCYTE [DISTWIDTH] IN BLOOD BY AUTOMATED COUNT: 13 % (ref 11.5–14.5)
HCT VFR BLD AUTO: 39.8 % (ref 42–52)
HGB BLD-MCNC: 12.4 GM/DL (ref 14–18)
MCH RBC QN AUTO: 26 PG (ref 26–33)
MCHC RBC AUTO-ENTMCNC: 31.2 GM/DL (ref 32.2–35.5)
MCV RBC AUTO: 83.4 FL (ref 80–94)
PLATELET # BLD AUTO: 471 THOU/MM3 (ref 130–400)
PMV BLD AUTO: 8.9 FL (ref 9.4–12.4)
RBC # BLD AUTO: 4.77 MILL/MM3 (ref 4.7–6.1)
WBC # BLD AUTO: 7.2 THOU/MM3 (ref 4.8–10.8)

## 2023-02-10 RX ORDER — TESTOSTERONE 4 MG/D
PATCH TRANSDERMAL
OUTPATIENT
Start: 2023-02-10 | End: 2023-03-12

## 2023-02-15 ENCOUNTER — OFFICE VISIT (OUTPATIENT)
Dept: FAMILY MEDICINE CLINIC | Age: 43
End: 2023-02-15

## 2023-02-15 ENCOUNTER — OFFICE VISIT (OUTPATIENT)
Dept: UROLOGY | Age: 43
End: 2023-02-15
Payer: COMMERCIAL

## 2023-02-15 VITALS
WEIGHT: 284 LBS | DIASTOLIC BLOOD PRESSURE: 70 MMHG | HEIGHT: 70 IN | BODY MASS INDEX: 40.66 KG/M2 | SYSTOLIC BLOOD PRESSURE: 138 MMHG

## 2023-02-15 VITALS
HEIGHT: 70 IN | OXYGEN SATURATION: 97 % | BODY MASS INDEX: 40.37 KG/M2 | TEMPERATURE: 97.1 F | RESPIRATION RATE: 22 BRPM | SYSTOLIC BLOOD PRESSURE: 136 MMHG | DIASTOLIC BLOOD PRESSURE: 78 MMHG | WEIGHT: 282 LBS | HEART RATE: 88 BPM

## 2023-02-15 DIAGNOSIS — Z09 HOSPITAL DISCHARGE FOLLOW-UP: ICD-10-CM

## 2023-02-15 DIAGNOSIS — J90 PLEURAL EFFUSION, RIGHT: ICD-10-CM

## 2023-02-15 DIAGNOSIS — Z11.59 ENCOUNTER FOR HEPATITIS C SCREENING TEST FOR LOW RISK PATIENT: ICD-10-CM

## 2023-02-15 DIAGNOSIS — Z13.220 SCREENING FOR LIPOID DISORDERS: ICD-10-CM

## 2023-02-15 DIAGNOSIS — Z11.4 SCREENING FOR HIV (HUMAN IMMUNODEFICIENCY VIRUS): ICD-10-CM

## 2023-02-15 DIAGNOSIS — E29.1 HYPOGONADISM IN MALE: ICD-10-CM

## 2023-02-15 DIAGNOSIS — E23.0 HYPOGONADOTROPIC HYPOGONADISM SYNDROME, MALE (HCC): ICD-10-CM

## 2023-02-15 DIAGNOSIS — R68.82 LOW LIBIDO: Primary | ICD-10-CM

## 2023-02-15 DIAGNOSIS — J18.9 PNEUMONIA OF RIGHT LUNG DUE TO INFECTIOUS ORGANISM, UNSPECIFIED PART OF LUNG: Primary | ICD-10-CM

## 2023-02-15 PROCEDURE — G8427 DOCREV CUR MEDS BY ELIG CLIN: HCPCS | Performed by: UROLOGY

## 2023-02-15 PROCEDURE — G8484 FLU IMMUNIZE NO ADMIN: HCPCS | Performed by: UROLOGY

## 2023-02-15 PROCEDURE — G8417 CALC BMI ABV UP PARAM F/U: HCPCS | Performed by: UROLOGY

## 2023-02-15 PROCEDURE — 99214 OFFICE O/P EST MOD 30 MIN: CPT | Performed by: UROLOGY

## 2023-02-15 PROCEDURE — 1111F DSCHRG MED/CURRENT MED MERGE: CPT | Performed by: UROLOGY

## 2023-02-15 PROCEDURE — 1036F TOBACCO NON-USER: CPT | Performed by: UROLOGY

## 2023-02-15 RX ORDER — TESTOSTERONE 4 MG/D
2 PATCH TRANSDERMAL EVERY 24 HOURS
Qty: 60 PATCH | Refills: 5 | Status: SHIPPED | OUTPATIENT
Start: 2023-02-15 | End: 2023-03-17

## 2023-02-15 SDOH — ECONOMIC STABILITY: FOOD INSECURITY: WITHIN THE PAST 12 MONTHS, YOU WORRIED THAT YOUR FOOD WOULD RUN OUT BEFORE YOU GOT MONEY TO BUY MORE.: NEVER TRUE

## 2023-02-15 SDOH — ECONOMIC STABILITY: INCOME INSECURITY: HOW HARD IS IT FOR YOU TO PAY FOR THE VERY BASICS LIKE FOOD, HOUSING, MEDICAL CARE, AND HEATING?: NOT VERY HARD

## 2023-02-15 SDOH — ECONOMIC STABILITY: HOUSING INSECURITY
IN THE LAST 12 MONTHS, WAS THERE A TIME WHEN YOU DID NOT HAVE A STEADY PLACE TO SLEEP OR SLEPT IN A SHELTER (INCLUDING NOW)?: NO

## 2023-02-15 SDOH — ECONOMIC STABILITY: FOOD INSECURITY: WITHIN THE PAST 12 MONTHS, THE FOOD YOU BOUGHT JUST DIDN'T LAST AND YOU DIDN'T HAVE MONEY TO GET MORE.: NEVER TRUE

## 2023-02-15 NOTE — PROGRESS NOTES
Post-Discharge Transitional Care  Follow Up      Angela Medley   YOB: 1980    Date of Office Visit:  2/15/2023  Date of Hospital Admission: 1/30/23  Date of Hospital Discharge: 2/7/23  Risk of hospital readmission (high >=14%. Medium >=10%) :Readmission Risk Score: 7.1      Care management risk score Rising risk (score 2-5) and Complex Care (Scores >=6): No Risk Score On File     Non face to face  following discharge, date last encounter closed (first attempt may have been earlier): 02/08/2023    Call initiated 2 business days of discharge: Yes    ASSESSMENT/PLAN:   Pneumonia of right lung due to infectious organism, unspecified part of lung  Screening for lipoid disorders  -     Lipid Panel; Future  Screening for HIV (human immunodeficiency virus)  -     HIV Screen; Future  Encounter for hepatitis C screening test for low risk patient  -     Hepatitis C Antibody; Future  Hospital discharge follow-up  -     NH DISCHARGE MEDS RECONCILED W/ CURRENT OUTPATIENT MED LIST  Hypogonadotropic hypogonadism syndrome, male ([address]  Pleural effusion, right      Medical Decision Making: moderate complexity  Return if symptoms worsen or fail to improve. PNA great improved, complete PO abx  Fasting/screening labs ordered  Follows with urology for hypogonadism  Pleural effusion resolved  Subjective:   HPI:  Follow up of Hospital problems/diagnosis(es): Patient presents for hospital follow-up. He is a 73-year-old male who initially presented to the ER for shortness of breath. He was initially treated in December for possible pneumonia but symptoms persisted into January. He reportedly went into the ER for cough and shortness of breath but work-up was negative so he was sent home. He then followed up here in our office and was given IM Rocephin as well as Levaquin and chest x-ray was ordered. Chest x-ray showed large right pleural effusion and suspected underlying airspace infiltrates.   Patient was instructed to go to the ER. He did go and was admitted for further evaluation. Patient was given antibiotics and chest tube was placed for drainage. He was sent home on p.o. Augmentin. States he still has slight cough but is no longer productive. His breathing is much improved. He denies any fever or chills. States overall he is feeling much better. He does also have a history of hypogonadism and he follows up with urology for this. Is also due for fasting screening labs and he agrees to have these done. Inpatient course: Discharge summary reviewed- see chart. Interval history/Current status: see HPI    Patient Active Problem List   Diagnosis    Hypogonadotropic hypogonadism syndrome, male ([facility] [address]    Hypogonadism in male    Mixed hyperlipidemia    Pneumonia due to organism    Acute pneumonia    Pleural effusion, right    Abnormal chest x-ray       Medications listed as ordered at the time of discharge from hospital     Medication List            Accurate as of February 15, 2023  1:28 PM. If you have any questions, ask your nurse or doctor. CHANGE how you take these medications      Androderm 4 MG/24HR Pt24 patch  Generic drug: testosterone  Apply 2 patches topically every 24 hours for 30 days. Two patches daily.  Max Daily Amount: 8 mg  What changed:   how to take this  when to take this  Changed by: Lilly Willis MD            CONTINUE taking these medications      ALLEGRA PO     amoxicillin-clavulanate 875-125 MG per tablet  Commonly known as: AUGMENTIN  Take 1 tablet by mouth every 12 hours for 25 doses     ibuprofen 200 MG tablet  Commonly known as: ADVIL;MOTRIN               Where to Get Your Medications        These medications were sent to [facility], [address] [place] [address] [phone] [address] [facility]      Phone: [phone]   Androderm 4 MG/24HR Pt24 patch           Medications marked \"taking\" at this time  Outpatient Medications Marked as Taking for the 2/15/23 encounter (Office Visit) with Gisel Blas MD   Medication Sig Dispense Refill    ANDRODERM 4 MG/24HR PT24 patch Apply 2 patches topically every 24 hours for 30 days. Two patches daily. Max Daily Amount: 8 mg 60 patch 5    amoxicillin-clavulanate (AUGMENTIN) 875-125 MG per tablet Take 1 tablet by mouth every 12 hours for 25 doses 25 tablet 0        Medications patient taking as of now reconciled against medications ordered at time of hospital discharge: Yes    A comprehensive review of systems was negative except for what was noted in the HPI. Objective:    /78   Pulse 88   Temp 97.1 °F (36.2 °C) (Temporal)   Resp 22   Ht 5' 10\" (1.778 m)   Wt 282 lb (127.9 kg)   SpO2 97%   BMI 40.46 kg/m²   General Appearance: alert and oriented to person, place and time, well-developed and well-nourished, in no acute distress  Pulmonary/Chest: clear to auscultation bilaterally- no wheezes, rales or rhonchi, normal air movement, no respiratory distress  Cardiovascular: normal rate, normal S1 and S2, no gallops, intact distal pulses, and no carotid bruits      An electronic signature was used to authenticate this note.   --Gisel Blas MD

## 2023-02-15 NOTE — PROGRESS NOTES
Dr. Lan Phoenix, MD  [address]   Urology Clinic      Patient:  Kemar Castillo  YOB: 1980  Date: 2/15/2023    HISTORY OF PRESENT ILLNESS:   The patient is a 40 y.o. male who presents today for follow-up for the following problem(s): low testosterone. Last testosterone is still low at 20!! He has been off T and is feeling poorly. Overall the problem(s) : are worsening. Associated Symptoms: No dysuria, gross hematuria. Pain Severity: 0/10    Summary of old records: On androderm patch. Imaging/Labs reviewed during today's visit:  I have independently reviewed and verified the following films during today's visit. HCT of 39 and T is 20. Last several PSA's:  Lab Results   Component Value Date    PSA 0.44 01/15/2021    PSA 0.68 07/07/2020    PSA 0.37 02/15/2020       Last total testosterone:  Lab Results   Component Value Date    TESTOSTERONE 20 (L) 02/10/2023       Urinalysis today:  No results found for this visit on 02/15/23. Last BUN and creatinine:  Lab Results   Component Value Date    BUN 14 02/06/2023     Lab Results   Component Value Date    CREATININE 0.9 02/06/2023       Imaging Reviewed during this Office Visit:   (results were independently reviewed by physician and radiology report verified)    PAST MEDICAL, FAMILY AND SOCIAL HISTORY UPDATE:  Past Medical History:   Diagnosis Date    Azoospermia     Hypogonadism      Past Surgical History:   Procedure Laterality Date    CHOLECYSTECTOMY      stent placement as well. CT GUIDED CHEST TUBE  2/1/2023    CT GUIDED CHEST TUBE 2/1/2023 [facility] CT SCAN     Family History   Problem Relation Age of Onset    Cancer Mother         hodgkins    No Known Problems Father      Outpatient Medications Marked as Taking for the 2/15/23 encounter (Office Visit) with Lan Phoenix, MD   Medication Sig Dispense Refill    ANDRODERM 4 MG/24HR PT24 patch Apply 2 patches topically every 24 hours for 30 days. Two patches daily.  Max Daily Amount: 8 mg 60 patch 5    amoxicillin-clavulanate (AUGMENTIN) 875-125 MG per tablet Take 1 tablet by mouth every 12 hours for 25 doses 25 tablet 0    Fexofenadine HCl (ALLEGRA PO) Take by mouth daily      ibuprofen (ADVIL;MOTRIN) 200 MG tablet Take 200 mg by mouth every 6 hours as needed for Pain         Patient has no known allergies. Social History     Tobacco Use   Smoking Status Never   Smokeless Tobacco Never       Social History     Substance and Sexual Activity   Alcohol Use No       REVIEW OF SYSTEMS:  Constitutional: negative  Eyes: negative  Respiratory: negative  Cardiovascular: negative  Gastrointestinal: negative  Musculoskeletal: negative  Genitourinary: negative except for what is in HPI  Skin: negative   Neurological: negative  Hematological/Lymphatic: negative  Psychological: negative    Physical Exam:      Vitals:    02/15/23 0754   BP: 138/70     Patient is a 40 y.o. male in no acute distress and alert and oriented to person, place and time. NAD, A/o  Non labored respiration  Normal peripheral pulses  Skin- warm and dry  Psych- normal mood and affect      Assessment and Plan      1. Low libido    2. Hypogonadism in male               Plan:      No follow-ups on file. T still pending. Will review when it results. See back in 6 months  HCT normal at 43.    Refilled T.         Dr. Jamison Silva MD

## 2023-02-28 ENCOUNTER — HOSPITAL ENCOUNTER (OUTPATIENT)
Dept: SLEEP CENTER | Age: 43
Discharge: HOME | End: 2023-03-02
Payer: COMMERCIAL

## 2023-02-28 DIAGNOSIS — R06.83 SNORING: ICD-10-CM

## 2023-02-28 DIAGNOSIS — G47.30 SLEEP APNEA, UNSPECIFIED TYPE: ICD-10-CM

## 2023-02-28 DIAGNOSIS — R40.0 DAYTIME SLEEPINESS: ICD-10-CM

## 2023-02-28 PROCEDURE — 95811 POLYSOM 6/>YRS CPAP 4/> PARM: CPT

## 2023-03-01 LAB — STATUS: NORMAL

## 2023-03-02 NOTE — PROGRESS NOTES
[address] [place] [address]                               SLEEP STUDY REPORT    PATIENT NAME: Sandy Marshall                    :        1980  MED REC NO:   [id number]                           ROOM:  ACCOUNT NO:   [de-identified]                           ADMIT DATE: 2023  PROVIDER:     Mallorie Orantes. MD Mayra    DATE OF STUDY:  2023    REFERRING PROVIDER:  Susy Mccollum MD    The patient's height is 70 inches, weight is 257 pounds with a BMI of  36.9. HISTORY:  The patient is a 73-year-old gentleman who was recently  evaluated by me on 2023. The patient was evaluated during his  hospitalization to [address] [place] [address]. The patient is currently  suffering with hypersomnia with [place] Sleepiness Score of 12. The  patient had a class III Mallampati airway and neck circumference of 25  inches. The patient was scheduled for split-night sleep study to  diagnose and treat sleep apnea; however, the patient underwent only  baseline sleep study. The technician attempted split-night sleep study. The patient not able to tolerate CPAP and mask. The patient underwent  only baseline sleep study. Please see technician's comment for further  details. METHODS:  The patient underwent digital polysomnography in compliance  with the standards and specifications from the AASM Manual including the  simultaneous recording of 3 EEG channels (F4-M1, C4-M1, and O2-M1 with  back up electrodes F3-M2, C3-M2, and O1-M2), 2 EOG channels (E1-M2, and  E2-M1,), EMG (chin, left & right leg), EKG, Nonin pulse oximetry with   less than 2 second averaging time, body position, airflow recorded by  oral-nasal thermal sensor and nasal air pressure transducer, plus  respiratory effort recorded by calibrated respiratory inductance  plethysmography (RIP), flow volume loop, sound and video.   Sleep staging  and scoring followed the standard put forth by the American Academy of  Sleep Medicine and utilized the 1B obstructive hypopnea event  desaturation of 4 percent or greater.    INTERPRETATION:  This is a baseline sleep study and the study was  performed on 02/28/2023.  The study was started at 09:19 p.m. and was  terminated at 04:49 a.m. with a total recording time of 450.2 minutes,  sleep period time was 449.9 minutes, total sleep time was 419 minutes,  and overall sleep efficiency was 93.1% of total sleep time.  The sleep  onset latency was 0.3 minutes, wake after sleep onset was 30.9 minutes,  and REM sleep latency was not available due to absent REM sleep.    SLEEP STAGING AND DISTRIBUTION SUMMARY:  Revealed the patient spent  105.5 minutes in stage I consisting of 25.2% of total sleep time, 295  minutes in stage II consisting of 70.4%, and 18.5 minutes in stage III  consisting of 4.4%.  The patient had absent REM sleep.    RESPIRATORY EVENT ANALYSIS:  Revealed the patient had a total of 53  apneas, all of them were obstructive in nature.  The patient also had a  total of 162 hypopneas, all of them were obstructive in nature.  The  total number of apneas and hypopneas recorded during the study was 215  with an apnea-hypopnea index of 30.8.  The patient's REM sleep  apnea-hypopnea index was not available due to absent REM sleep.    POSITION ANALYSIS:  Revealed the patient spent 179.4 minutes in supine  position, 92 minutes in prone position, 147.7 minutes in right lateral  position with a supine apnea-hypopnea index of 29.1 whereas prone  position apnea-hypopnea index was 25.4, right lateral position  apnea-hypopnea index was 33.7.    PERIODIC LIMB MOVEMENT ANALYSIS:  Revealed the patient did not have any  periodic limb movements.  The patient had a total of 62 spontaneous  arousals with a spontaneous arousal index of 8.9.    OXYGEN SATURATION MONITORING:  Revealed the patient had a maximum oxygen  desaturation to 81% with a mean oxygen saturation  of 95.3%. The patient  spent a total of 4.8 minutes below oxygen saturation less than 88%. EKG MONITORING:  Revealed normal sinus rhythm. The patient found to have moderate-to-loud snoring during the sleep  study. The patient not able to tolerate PAP device or mask. Hence, the patient  underwent only baseline sleep study. IMPRESSION:  1. Severe obstructive sleep apnea with worsening of respiratory events  in right lateral position. 2.  Absent REM sleep limiting the interpretation of the sleep study. 3.  Hypersomnia with [place] Sleepiness Score of 12. RECOMMENDATIONS:  1. The patient not able to tolerate CPAP/mask during attempted  split-night sleep study on 02/28/2023.  2.  The patient should be scheduled for followup with my clinic as soon  as possible to discuss about the sleep study finding for further  management. Thanks to Aubrie Ledemsa MD, for giving me this opportunity to  participate in the care of this pleasant gentleman.         Annalee Whitaker MD    D: 03/01/2023 17:35:53       T: 03/02/2023 13:44:19     [email or web address]  Job#: [id number]     Doc#: [id number]    CC:

## 2023-03-09 ENCOUNTER — TELEPHONE (OUTPATIENT)
Dept: SLEEP CENTER | Age: 43
End: 2023-03-09

## 2023-03-09 NOTE — TELEPHONE ENCOUNTER
Basil Aguirre was here for a Split night study on 2/28/23, he did not tolerate the PAP pressure and mask. Dr. Clara Fraga is requesting the Basil Aguirre follow-up in his clinic ASAP to go over the sleep study results. His F/U appt is currently scheduled for 5/5/23 with Deya. Patient will need to be called and this appt R/S to a sooner date. Thanks so much.

## 2023-03-13 NOTE — TELEPHONE ENCOUNTER
Patient called back and I was able to get him in with Sindy on 3/20/2023 at 3:20 pm. Patient is aware of this appt.

## 2023-03-20 ENCOUNTER — OFFICE VISIT (OUTPATIENT)
Dept: PULMONOLOGY | Age: 43
End: 2023-03-20
Payer: COMMERCIAL

## 2023-03-20 VITALS
DIASTOLIC BLOOD PRESSURE: 82 MMHG | TEMPERATURE: 97.7 F | OXYGEN SATURATION: 98 % | HEART RATE: 98 BPM | BODY MASS INDEX: 40.74 KG/M2 | SYSTOLIC BLOOD PRESSURE: 142 MMHG | HEIGHT: 70 IN | WEIGHT: 284.6 LBS

## 2023-03-20 DIAGNOSIS — E66.01 MORBID OBESITY (HCC): ICD-10-CM

## 2023-03-20 DIAGNOSIS — G47.33 OSA (OBSTRUCTIVE SLEEP APNEA): Primary | ICD-10-CM

## 2023-03-20 DIAGNOSIS — R40.0 DAYTIME SLEEPINESS: ICD-10-CM

## 2023-03-20 DIAGNOSIS — R06.83 SNORING: ICD-10-CM

## 2023-03-20 PROCEDURE — 99214 OFFICE O/P EST MOD 30 MIN: CPT | Performed by: NURSE PRACTITIONER

## 2023-03-20 RX ORDER — NEBULIZER ACCESSORIES
EACH MISCELLANEOUS
Qty: 1 EACH | Refills: 0 | Status: SHIPPED | OUTPATIENT
Start: 2023-03-20

## 2023-03-20 ASSESSMENT — ENCOUNTER SYMPTOMS
STRIDOR: 0
WHEEZING: 0
RESPIRATORY NEGATIVE: 1
GASTROINTESTINAL NEGATIVE: 1
ALLERGIC/IMMUNOLOGIC NEGATIVE: 1
NAUSEA: 0
VOMITING: 0
DIARRHEA: 0
CHEST TIGHTNESS: 0
EYES NEGATIVE: 1

## 2023-03-20 NOTE — PROGRESS NOTES
sounds are normal. There is no distension. Palpations: Abdomen is soft. Skin:     General: Skin is warm and dry. Capillary Refill: Capillary refill takes less than 2 seconds. Neurological:      Mental Status: He is alert and oriented to person, place, and time. Psychiatric:         Behavior: Behavior normal.         Thought Content: Thought content normal.         Judgment: Judgment normal.      Assessment   Diagnosis Orders   1. [place] (obstructive sleep apnea)  Respiratory Therapy Supplies (ADULT MASK) MISC    DME Order for CPAP as OP      2. Morbid obesity ([address]        3. Snoring        4.  Daytime sleepiness           Recommendations    -Split night sleep study discussed with patient today in the office   -DME order placed for APAP 6-20 cm H2O   -Order placed for mask desensitization   -Advised patient to contact DME with any issues regarding mask after initiating therapy.   -Weight loss encouraged and discussed reaching out to PCP for further weight loss options   -RTC 3 months with DL     Electronically signed by EFREN Siddiqui CNP on 3/20/2023 at 4:02 PM

## 2023-03-27 ENCOUNTER — TELEPHONE (OUTPATIENT)
Dept: PULMONOLOGY | Age: 43
End: 2023-03-27

## 2023-03-27 NOTE — TELEPHONE ENCOUNTER
Zohra Adams called and left a message on [employer]. He was here last week and waiting to find out about pap order. I called and left a message that pap order was faxed to [facility]. If patient has anymore questions [facility]

## 2023-05-15 ENCOUNTER — TELEPHONE (OUTPATIENT)
Dept: UROLOGY | Age: 43
End: 2023-05-15

## 2023-05-15 DIAGNOSIS — E29.1 HYPOGONADISM IN MALE: Primary | ICD-10-CM

## 2023-05-15 RX ORDER — TESTOSTERONE 4 MG/D
2 PATCH TRANSDERMAL EVERY 24 HOURS
Qty: 60 PATCH | Refills: 2 | Status: SHIPPED | OUTPATIENT
Start: 2023-05-15 | End: 2023-06-14

## 2023-05-15 NOTE — TELEPHONE ENCOUNTER
[facility] told the patient the  discontinued the androgel patches. Can it be sent to [facility]?
Patient advised prescription was sent to the pharmacy. He voiced understanding.
Prescription sent to [place]. Follow-up as scheduled with Dr. Lea Rogers 8/16/23.
13-Sep-2020 19:45

## 2023-05-30 ENCOUNTER — TELEPHONE (OUTPATIENT)
Dept: UROLOGY | Age: 43
End: 2023-05-30

## 2023-05-30 DIAGNOSIS — E29.1 HYPOGONADISM IN MALE: Primary | ICD-10-CM

## 2023-05-30 NOTE — TELEPHONE ENCOUNTER
Pt called stating they are discontinuing the patches. Called [facility]ik and they verified. Pt would like to know what else he can use. Please advise.

## 2023-06-01 RX ORDER — TESTOSTERONE 16.2 MG/G
1 GEL TRANSDERMAL DAILY
Qty: 225 G | Refills: 0 | Status: SHIPPED | OUTPATIENT
Start: 2023-06-01 | End: 2023-09-01

## 2023-06-01 NOTE — TELEPHONE ENCOUNTER
Androgel sent. Follow-up with Dr. Chanelle Vegas 8/16/2023. Patient to have testosterone, PSA and H&H checked prior to next visit.

## 2023-06-21 ENCOUNTER — OFFICE VISIT (OUTPATIENT)
Dept: PULMONOLOGY | Age: 43
End: 2023-06-21
Payer: COMMERCIAL

## 2023-06-21 VITALS
DIASTOLIC BLOOD PRESSURE: 80 MMHG | BODY MASS INDEX: 40.71 KG/M2 | TEMPERATURE: 98.3 F | WEIGHT: 284.4 LBS | SYSTOLIC BLOOD PRESSURE: 128 MMHG | OXYGEN SATURATION: 97 % | HEART RATE: 83 BPM | HEIGHT: 70 IN

## 2023-06-21 DIAGNOSIS — E66.01 MORBID OBESITY (HCC): ICD-10-CM

## 2023-06-21 DIAGNOSIS — G47.33 OSA ON CPAP: Primary | ICD-10-CM

## 2023-06-21 DIAGNOSIS — Z99.89 OSA ON CPAP: Primary | ICD-10-CM

## 2023-06-21 PROCEDURE — 99214 OFFICE O/P EST MOD 30 MIN: CPT | Performed by: NURSE PRACTITIONER

## 2023-06-21 ASSESSMENT — ENCOUNTER SYMPTOMS
STRIDOR: 0
ALLERGIC/IMMUNOLOGIC NEGATIVE: 1
WHEEZING: 0
RESPIRATORY NEGATIVE: 1
CHEST TIGHTNESS: 0
NAUSEA: 0
VOMITING: 0
DIARRHEA: 0
EYES NEGATIVE: 1
GASTROINTESTINAL NEGATIVE: 1

## 2023-06-21 NOTE — PROGRESS NOTES
swelling. Gastrointestinal: Negative. Negative for diarrhea, nausea and vomiting. Endocrine: Negative. Genitourinary: Negative. Negative for dysuria. Musculoskeletal: Negative. Skin: Negative. Allergic/Immunologic: Negative. Neurological: Negative. Hematological: Negative. Psychiatric/Behavioral: Negative. Physical Exam:    BMI:  Body mass index is 40.81 kg/m². Wt Readings from Last 3 Encounters:   06/21/23 284 lb 6.4 oz (129 kg)   03/20/23 284 lb 9.6 oz (129.1 kg)   02/15/23 282 lb (127.9 kg)     Vitals: /80   Pulse 83   Temp 98.3 °F (36.8 °C)   Ht 5' 10\" (1.778 m)   Wt 284 lb 6.4 oz (129 kg)   SpO2 97%   BMI 40.81 kg/m²       Physical Exam  Vitals and nursing note reviewed. Constitutional:       General: He is not in acute distress. Appearance: He is morbidly obese. HENT:      Head: Normocephalic and atraumatic. Neck:      Trachea: No tracheal deviation. Cardiovascular:      Rate and Rhythm: Normal rate and regular rhythm. Heart sounds: Normal heart sounds. No murmur heard. Pulmonary:      Effort: Pulmonary effort is normal. No respiratory distress. Breath sounds: Normal breath sounds. No stridor. No wheezing or rales. Chest:      Chest wall: No tenderness. Abdominal:      General: Bowel sounds are normal. There is no distension. Palpations: Abdomen is soft. Skin:     General: Skin is warm and dry. Capillary Refill: Capillary refill takes less than 2 seconds. Neurological:      Mental Status: He is alert and oriented to person, place, and time. Psychiatric:         Behavior: Behavior normal.         Thought Content: Thought content normal.         Judgment: Judgment normal.     ASSESSMENT/DIAGNOSIS     Diagnosis Orders   1. [place] on CPAP  CPAP Machine MISC      2.  Morbid obesity ([address]             Plan   - Download reviewed and discussed with Jeremiah Siemens and myself today in the office   - Order placed to change from APAP to CPAP 8

## 2023-07-11 ENCOUNTER — TELEPHONE (OUTPATIENT)
Dept: PULMONOLOGY | Age: 43
End: 2023-07-11

## 2023-07-11 NOTE — TELEPHONE ENCOUNTER
Returning pt message he left in the voicemail. Pt is having trouble staying a sleep using his machine at night wasn't to sure on what it is that's waking him up. Pt stated it's not making any noises, not sure if he is needing a pressure change. Pt mentioned he has an appointment with his DME for a mask refit to see if that's the problem. Advise the pt after getting his new mask and he still remaining to have those same issues to give us a call back.

## 2023-07-12 DIAGNOSIS — G47.33 OSA ON CPAP: Primary | ICD-10-CM

## 2023-07-12 DIAGNOSIS — Z99.89 OSA ON CPAP: Primary | ICD-10-CM

## 2023-07-12 PROCEDURE — 99091 COLLJ & INTERPJ DATA EA 30 D: CPT | Performed by: NURSE PRACTITIONER

## 2023-07-12 NOTE — PROGRESS NOTES
DX: [place]    Download reviewed today due to low compliance. Last visit patient was switched from APAP to CPAP with a pressure of 8 cm H2O. Patient feels like he isn't getting enough pressure. Will increase pressure to 10 cm H2O with download in 2 weeks. Order faxed today to [facility]. Discussed with patient today over the phone and he verbalized understanding. Will call office if pressure is too high.      Electronically signed by EFREN Validvia CNP on 7/12/2023 at 10:26 AM

## 2023-07-14 ENCOUNTER — NURSE ONLY (OUTPATIENT)
Dept: LAB | Age: 43
End: 2023-07-14

## 2023-07-14 DIAGNOSIS — E29.1 HYPOGONADISM IN MALE: ICD-10-CM

## 2023-07-14 LAB
HCT VFR BLD AUTO: 40.4 % (ref 42–52)
HGB BLD-MCNC: 13.6 GM/DL (ref 14–18)
PSA SERPL-MCNC: 0.36 NG/ML (ref 0–1)

## 2023-07-21 ENCOUNTER — TELEPHONE (OUTPATIENT)
Dept: PULMONOLOGY | Age: 43
End: 2023-07-21

## 2023-07-21 NOTE — TELEPHONE ENCOUNTER
Pt called stating he is still having issues with the pressure on his PAP machine. I tried to call pt back he did not answer there is a download scanned in. Pt states it is waking him up every 10-15 minutes. He does have the nasal pillows that he's using now. Please advise.

## 2023-07-27 ENCOUNTER — TELEPHONE (OUTPATIENT)
Dept: PULMONOLOGY | Age: 43
End: 2023-07-27

## 2023-07-27 DIAGNOSIS — G47.33 OSA ON CPAP: Primary | ICD-10-CM

## 2023-07-27 DIAGNOSIS — Z99.89 OSA ON CPAP: Primary | ICD-10-CM

## 2023-07-27 NOTE — TELEPHONE ENCOUNTER
Discussed PAP usage with Neftaly Fitzgerald he feels now his PAP pressure is too high and is requesting to be decreased back down to 8 cm H2O. Order placed and faxed to DME. 2 week download reviewed today regarding last pressure change. Controlled AHI noted.

## 2023-10-06 NOTE — PROGRESS NOTES
[facility]      Maureen Galeazzi         [id number]  10/9/2023   Chief Complaint   Patient presents with    Follow-up     3wk [place] f/u w/o download. Pt returned PAP AMA per [address] Couldn't get used to using the PAP machine. Pt of Dr. Kris Alaniz     PAP Download:   Original or initial AHI: 30.8     Date of initial study: 2/28/2023        Pt returned PAP machine to [address] AMA. Neck Size: 21.25  Mallampati 4  ESS:  7  SAQLI: 52    Here is a scan of the most recent download:  No download available. Pt returned PAP AMA. Presentation:   Kam Garcia presents for sleep medicine follow up for obstructive sleep apnea  Since the last visit, Kam Garcia has not been wearing his CPAP and returned this AMA. Patient to office today to discuss possible Inspire placement  MO BMI 40- discussed importance of watching weight and NOT gaining anymore if BMI > 40 would NOT be eligible for Inspire   Most recent sleep study done in 1/2023  No sleep medication use at night   Still with fatigue during the day and (+) for snoring     Weight gained 11 lbs over 4-6 months     Equipment issues: The pressure is  acceptable, the mask is acceptable     Sleep issues:  Do you feel better? No  More rested? No   Better concentration? no  Difficulty falling sleep? No  Difficulty staying asleep? No  Snoring? Yes    Progress History:   Since last visit any new medical issues? No  New ER or hospital visits? No  Any new or changes in medicines? No  Any new sleep medicines? No    Review of Systems -   Review of Systems   Constitutional:  Positive for fatigue. Negative for chills, fever and unexpected weight change. HENT: Negative. Eyes: Negative. Respiratory: Negative. Negative for chest tightness, wheezing and stridor. Cardiovascular:  Negative for chest pain and leg swelling. Gastrointestinal: Negative. Negative for diarrhea, nausea and vomiting. Endocrine: Negative.     Genitourinary:

## 2023-10-09 ENCOUNTER — OFFICE VISIT (OUTPATIENT)
Dept: PULMONOLOGY | Age: 43
End: 2023-10-09

## 2023-10-09 VITALS
WEIGHT: 295 LBS | HEART RATE: 77 BPM | BODY MASS INDEX: 42.23 KG/M2 | DIASTOLIC BLOOD PRESSURE: 78 MMHG | TEMPERATURE: 98.3 F | SYSTOLIC BLOOD PRESSURE: 114 MMHG | HEIGHT: 70 IN | OXYGEN SATURATION: 97 %

## 2023-10-09 DIAGNOSIS — E66.01 MORBID OBESITY (HCC): ICD-10-CM

## 2023-10-09 DIAGNOSIS — Z91.199 POOR COMPLIANCE WITH CONTINUOUS POSITIVE AIRWAY PRESSURE TREATMENT: ICD-10-CM

## 2023-10-09 DIAGNOSIS — G47.33 OSA (OBSTRUCTIVE SLEEP APNEA): Primary | ICD-10-CM

## 2023-10-09 PROCEDURE — 99213 OFFICE O/P EST LOW 20 MIN: CPT | Performed by: NURSE PRACTITIONER

## 2023-10-09 ASSESSMENT — ENCOUNTER SYMPTOMS
ALLERGIC/IMMUNOLOGIC NEGATIVE: 1
CHEST TIGHTNESS: 0
EYES NEGATIVE: 1
DIARRHEA: 0
WHEEZING: 0
STRIDOR: 0
RESPIRATORY NEGATIVE: 1
NAUSEA: 0
VOMITING: 0
GASTROINTESTINAL NEGATIVE: 1

## 2023-12-15 ENCOUNTER — HOSPITAL ENCOUNTER (OUTPATIENT)
Age: 43
Discharge: HOME | End: 2023-12-15
Payer: COMMERCIAL

## 2023-12-15 ENCOUNTER — HOSPITAL ENCOUNTER (OUTPATIENT)
Dept: GENERAL RADIOLOGY | Age: 43
Discharge: HOME | End: 2023-12-15
Payer: COMMERCIAL

## 2023-12-15 ENCOUNTER — OFFICE VISIT (OUTPATIENT)
Dept: FAMILY MEDICINE CLINIC | Age: 43
End: 2023-12-15
Payer: COMMERCIAL

## 2023-12-15 VITALS
WEIGHT: 287 LBS | SYSTOLIC BLOOD PRESSURE: 126 MMHG | DIASTOLIC BLOOD PRESSURE: 74 MMHG | BODY MASS INDEX: 41.09 KG/M2 | HEIGHT: 70 IN | HEART RATE: 78 BPM | OXYGEN SATURATION: 99 %

## 2023-12-15 DIAGNOSIS — R05.2 SUBACUTE COUGH: ICD-10-CM

## 2023-12-15 DIAGNOSIS — R05.2 SUBACUTE COUGH: Primary | ICD-10-CM

## 2023-12-15 PROCEDURE — 71046 X-RAY EXAM CHEST 2 VIEWS: CPT

## 2023-12-15 PROCEDURE — 99213 OFFICE O/P EST LOW 20 MIN: CPT | Performed by: FAMILY MEDICINE

## 2023-12-15 PROCEDURE — 1036F TOBACCO NON-USER: CPT | Performed by: FAMILY MEDICINE

## 2023-12-15 PROCEDURE — G8427 DOCREV CUR MEDS BY ELIG CLIN: HCPCS | Performed by: FAMILY MEDICINE

## 2023-12-15 PROCEDURE — G8417 CALC BMI ABV UP PARAM F/U: HCPCS | Performed by: FAMILY MEDICINE

## 2023-12-15 PROCEDURE — G8484 FLU IMMUNIZE NO ADMIN: HCPCS | Performed by: FAMILY MEDICINE

## 2023-12-15 RX ORDER — DOXYCYCLINE HYCLATE 100 MG
100 TABLET ORAL 2 TIMES DAILY
Qty: 14 TABLET | Refills: 0 | Status: SHIPPED | OUTPATIENT
Start: 2023-12-15 | End: 2023-12-22

## 2023-12-15 NOTE — RESULT ENCOUNTER NOTE
Please let pt know he has a small amt of fluid at the same area R lower lung- will try antibiotic x 1 week since he has had more productive cough over the last month. I think it would be useful for him to have a follow up CT of the chest in January when the antibiotic is finished to ensure this resolves. I see he has pulmonology visit end of January as well.

## 2023-12-19 DIAGNOSIS — E29.1 HYPOGONADISM IN MALE: ICD-10-CM

## 2023-12-19 RX ORDER — TESTOSTERONE 20.25 MG/1.25G
GEL TOPICAL
Qty: 75 G | Refills: 0 | OUTPATIENT
Start: 2023-12-19

## 2023-12-28 ENCOUNTER — OFFICE VISIT (OUTPATIENT)
Dept: UROLOGY | Age: 43
End: 2023-12-28
Payer: COMMERCIAL

## 2023-12-28 VITALS — WEIGHT: 287 LBS | RESPIRATION RATE: 16 BRPM | HEIGHT: 70 IN | BODY MASS INDEX: 41.09 KG/M2

## 2023-12-28 DIAGNOSIS — R68.82 LOW LIBIDO: ICD-10-CM

## 2023-12-28 DIAGNOSIS — E29.1 HYPOGONADISM IN MALE: Primary | ICD-10-CM

## 2023-12-28 DIAGNOSIS — Z12.5 PROSTATE CANCER SCREENING: ICD-10-CM

## 2023-12-28 PROCEDURE — 99214 OFFICE O/P EST MOD 30 MIN: CPT

## 2023-12-28 PROCEDURE — G8417 CALC BMI ABV UP PARAM F/U: HCPCS

## 2023-12-28 PROCEDURE — G8484 FLU IMMUNIZE NO ADMIN: HCPCS

## 2023-12-28 PROCEDURE — 1036F TOBACCO NON-USER: CPT

## 2023-12-28 PROCEDURE — G8427 DOCREV CUR MEDS BY ELIG CLIN: HCPCS

## 2023-12-28 RX ORDER — TESTOSTERONE 16.2 MG/G
2 GEL TRANSDERMAL DAILY
Qty: 75 G | Refills: 5 | Status: SHIPPED | OUTPATIENT
Start: 2023-12-28 | End: 2024-04-26

## 2023-12-28 NOTE — PROGRESS NOTES
[address] [facility].  [address]  [facility] [address]  Dept: [phone]  Loc: [phone]  Visit Date: 12/28/2023    YANNICK Valentin is a 44 y.o. male that presents to the urology clinic for hypogonadism in male and low testosterone. On TRT. Applying 1 actuation to each upper arm daily. Testosterone low, however patient states that he had been out of gel for nearly 10 days prior to his labs. Well controlled symptomatically when taking the gel as prescribed. Notes improvement in energy, libido, erections, and body habitus on TRT. Most recent PSA: 0.36   (07/14/23)      Last total testosterone:  Lab Results   Component Value Date    TESTOSTERONE 27 (L) 12/22/2023         Last BUN and creatinine:  Lab Results   Component Value Date    BUN 14 02/06/2023     Lab Results   Component Value Date    CREATININE 0.9 02/06/2023           PAST MEDICAL, FAMILY AND SOCIAL HISTORY UPDATE:  Past Medical History:   Diagnosis Date    Azoospermia     Hypogonadism      Past Surgical History:   Procedure Laterality Date    CHOLECYSTECTOMY      stent placement as well. CT GUIDED CHEST TUBE  2/1/2023    CT GUIDED CHEST TUBE 2/1/2023 [facility] CT SCAN     Family History   Problem Relation Age of Onset    Cancer Mother         hodgkins    No Known Problems Father      Outpatient Medications Marked as Taking for the 12/28/23 encounter (Office Visit) with Dalton Mariscal PA-C   Medication Sig Dispense Refill    Testosterone (ANDROGEL) 20.25 MG/ACT (1.62%) GEL gel Place 2 actuation onto the skin daily for 120 days. Max Daily Amount: 40.5 mg 75 g 3    Fexofenadine HCl (ALLEGRA PO) Take by mouth daily      ibuprofen (ADVIL;MOTRIN) 200 MG tablet Take 1 tablet by mouth every 6 hours as needed for Pain         Patient has no known allergies.   Social History     Tobacco Use   Smoking Status Never   Smokeless Tobacco Never   Tobacco Comments    Never smoker       Social

## 2024-01-04 ENCOUNTER — HOSPITAL ENCOUNTER (OUTPATIENT)
Dept: CT IMAGING | Age: 44
Discharge: HOME | End: 2024-01-04
Payer: COMMERCIAL

## 2024-01-04 DIAGNOSIS — R05.2 SUBACUTE COUGH: ICD-10-CM

## 2024-01-04 PROCEDURE — 71260 CT THORAX DX C+: CPT

## 2024-01-04 PROCEDURE — 6360000004 HC RX CONTRAST MEDICATION: Performed by: FAMILY MEDICINE

## 2024-01-04 RX ADMIN — IOPAMIDOL 100 ML: 755 INJECTION, SOLUTION INTRAVENOUS at 09:22

## 2024-01-09 DIAGNOSIS — J90 PLEURAL EFFUSION: Primary | ICD-10-CM

## 2024-01-23 ENCOUNTER — OFFICE VISIT (OUTPATIENT)
Dept: PULMONOLOGY | Age: 44
End: 2024-01-23
Payer: COMMERCIAL

## 2024-01-23 VITALS
SYSTOLIC BLOOD PRESSURE: 122 MMHG | OXYGEN SATURATION: 97 % | DIASTOLIC BLOOD PRESSURE: 74 MMHG | WEIGHT: 293 LBS | TEMPERATURE: 98.2 F | HEIGHT: 70 IN | BODY MASS INDEX: 41.95 KG/M2 | HEART RATE: 99 BPM

## 2024-01-23 DIAGNOSIS — I27.20 PULMONARY HYPERTENSION (HCC): ICD-10-CM

## 2024-01-23 DIAGNOSIS — R06.02 SHORTNESS OF BREATH: ICD-10-CM

## 2024-01-23 DIAGNOSIS — R05.3 CHRONIC COUGH: ICD-10-CM

## 2024-01-23 DIAGNOSIS — J90 PLEURAL EFFUSION, RIGHT: Primary | ICD-10-CM

## 2024-01-23 DIAGNOSIS — G47.33 OSA (OBSTRUCTIVE SLEEP APNEA): ICD-10-CM

## 2024-01-23 PROBLEM — J18.9 ACUTE PNEUMONIA: Status: RESOLVED | Noted: 2023-01-30 | Resolved: 2024-01-23

## 2024-01-23 PROBLEM — E66.01 MORBID OBESITY (HCC): Chronic | Status: ACTIVE | Noted: 2024-01-23

## 2024-01-23 PROBLEM — J18.9 PNEUMONIA DUE TO ORGANISM: Status: RESOLVED | Noted: 2019-01-04 | Resolved: 2024-01-23

## 2024-01-23 PROCEDURE — 99204 OFFICE O/P NEW MOD 45 MIN: CPT | Performed by: INTERNAL MEDICINE

## 2024-01-23 ASSESSMENT — ENCOUNTER SYMPTOMS
SHORTNESS OF BREATH: 0
WHEEZING: 0
COUGH: 1
HEMOPTYSIS: 0
SORE THROAT: 0

## 2024-01-23 NOTE — PROGRESS NOTES
returning.  No significant asbestos exposures  -Does have some sinusitis symptoms but has been improving per patient, will look into this next visit if still causing issues  -Spent significant amount of time on weight loss for his obstructive sleep apnea, he would like to ask his PCP to get an A1c to possibly get on Ozempic or Wegovy.  May also need bariatric referral  -Advised to maintain vaccines   -Advised patient to call office with any changes, questions, or concerns regarding respiratory status    Will see Luis Carlos Correa in:  Return in about 6 months (around 7/23/2024) for effusion .       1/23/2024  11:13 AM

## 2024-01-26 ENCOUNTER — HOSPITAL ENCOUNTER (OUTPATIENT)
Age: 44
Discharge: HOME | End: 2024-01-26
Attending: INTERNAL MEDICINE
Payer: COMMERCIAL

## 2024-01-26 VITALS
DIASTOLIC BLOOD PRESSURE: 74 MMHG | SYSTOLIC BLOOD PRESSURE: 122 MMHG | HEIGHT: 70 IN | WEIGHT: 293 LBS | BODY MASS INDEX: 41.95 KG/M2

## 2024-01-26 DIAGNOSIS — I27.20 PULMONARY HYPERTENSION (HCC): ICD-10-CM

## 2024-01-26 DIAGNOSIS — R06.02 SHORTNESS OF BREATH: ICD-10-CM

## 2024-01-26 DIAGNOSIS — G47.33 OSA (OBSTRUCTIVE SLEEP APNEA): ICD-10-CM

## 2024-01-26 DIAGNOSIS — R05.3 CHRONIC COUGH: ICD-10-CM

## 2024-01-26 LAB
ECHO AO ASC DIAM: 3.2 CM
ECHO AO ASCENDING AORTA INDEX: 1.3 CM/M2
ECHO AV CUSP MM: 2.1 CM
ECHO AV MEAN GRADIENT: 5 MMHG
ECHO AV MEAN VELOCITY: 1.1 M/S
ECHO AV PEAK GRADIENT: 9 MMHG
ECHO AV PEAK VELOCITY: 1.5 M/S
ECHO AV VELOCITY RATIO: 0.87
ECHO AV VTI: 28.6 CM
ECHO BSA: 2.56 M2
ECHO EST RA PRESSURE: 5 MMHG
ECHO LA AREA 2C: 18.6 CM2
ECHO LA AREA 4C: 18 CM2
ECHO LA DIAMETER INDEX: 1.38 CM/M2
ECHO LA DIAMETER: 3.4 CM
ECHO LA MAJOR AXIS: 5.7 CM
ECHO LA MINOR AXIS: 5.6 CM
ECHO LA VOL BP: 50 ML (ref 18–58)
ECHO LA VOL MOD A2C: 52 ML (ref 18–58)
ECHO LA VOL MOD A4C: 47 ML (ref 18–58)
ECHO LA VOL/BSA BIPLANE: 20 ML/M2 (ref 16–34)
ECHO LA VOLUME INDEX MOD A2C: 21 ML/M2 (ref 16–34)
ECHO LA VOLUME INDEX MOD A4C: 19 ML/M2 (ref 16–34)
ECHO LV E' LATERAL VELOCITY: 9 CM/S
ECHO LV E' SEPTAL VELOCITY: 9 CM/S
ECHO LV FRACTIONAL SHORTENING: 32 % (ref 28–44)
ECHO LV INTERNAL DIMENSION DIASTOLE INDEX: 1.79 CM/M2
ECHO LV INTERNAL DIMENSION DIASTOLIC: 4.4 CM (ref 4.2–5.9)
ECHO LV INTERNAL DIMENSION SYSTOLIC INDEX: 1.22 CM/M2
ECHO LV INTERNAL DIMENSION SYSTOLIC: 3 CM
ECHO LV ISOVOLUMETRIC RELAXATION TIME (IVRT): 71 MS
ECHO LV IVSD: 1.2 CM (ref 0.6–1)
ECHO LV MASS 2D: 191.3 G (ref 88–224)
ECHO LV MASS INDEX 2D: 77.8 G/M2 (ref 49–115)
ECHO LV POSTERIOR WALL DIASTOLIC: 1.2 CM (ref 0.6–1)
ECHO LV RELATIVE WALL THICKNESS RATIO: 0.55
ECHO LVOT AV VTI INDEX: 0.74
ECHO LVOT MEAN GRADIENT: 3 MMHG
ECHO LVOT PEAK GRADIENT: 7 MMHG
ECHO LVOT PEAK VELOCITY: 1.3 M/S
ECHO LVOT VTI: 21.3 CM
ECHO MV A VELOCITY: 0.89 M/S
ECHO MV E DECELERATION TIME (DT): 229 MS
ECHO MV E VELOCITY: 1.08 M/S
ECHO MV E/A RATIO: 1.21
ECHO MV E/E' LATERAL: 12
ECHO MV E/E' RATIO (AVERAGED): 12
ECHO PV MAX VELOCITY: 0.8 M/S
ECHO PV PEAK GRADIENT: 3 MMHG
ECHO RIGHT VENTRICULAR SYSTOLIC PRESSURE (RVSP): 33 MMHG
ECHO RV INTERNAL DIMENSION: 2.8 CM
ECHO RV TAPSE: 2.3 CM (ref 1.7–?)
ECHO TV E WAVE: 0.6 M/S
ECHO TV REGURGITANT MAX VELOCITY: 2.65 M/S
ECHO TV REGURGITANT PEAK GRADIENT: 28 MMHG

## 2024-01-26 PROCEDURE — 93306 TTE W/DOPPLER COMPLETE: CPT

## 2024-01-26 PROCEDURE — 93306 TTE W/DOPPLER COMPLETE: CPT | Performed by: INTERNAL MEDICINE

## 2024-04-18 ENCOUNTER — HOSPITAL ENCOUNTER (OUTPATIENT)
Dept: CT IMAGING | Age: 44
Discharge: HOME | End: 2024-04-18
Payer: COMMERCIAL

## 2024-04-18 DIAGNOSIS — R05.2 SUBACUTE COUGH: ICD-10-CM

## 2024-04-18 PROCEDURE — 6360000004 HC RX CONTRAST MEDICATION: Performed by: FAMILY MEDICINE

## 2024-04-18 PROCEDURE — 71260 CT THORAX DX C+: CPT

## 2024-04-18 RX ADMIN — IOPAMIDOL 100 ML: 755 INJECTION, SOLUTION INTRAVENOUS at 08:47

## 2024-04-23 NOTE — PROGRESS NOTES
Dr. Francisco Green MD  [facility])  Urology Clinic      Patient:  Collin Bell  YOB: 1980  Date: 8/3/2022    HISTORY OF PRESENT ILLNESS:   The patient is a 40 y.o. male who presents today for follow-up for the following problem(s): low testosterone. Last testosterone is still low at 146. PSA 0.44. Feels somewhat better on T patch. Overall the problem(s) : are worsening. Associated Symptoms: No dysuria, gross hematuria. Pain Severity: 0/10    Summary of old records: On androderm patch. Feeling well. Imaging/Labs reviewed during today's visit:  I have independently reviewed and verified the following films during today's visit. HCT of 43 and HGB of 14.5. Last several PSA's:  Lab Results   Component Value Date    PSA 0.44 01/15/2021    PSA 0.68 07/07/2020    PSA 0.37 02/15/2020       Last total testosterone:  Lab Results   Component Value Date    TESTOSTERONE 363 01/31/2022       Urinalysis today:  No results found for this visit on 08/03/22. Last BUN and creatinine:  Lab Results   Component Value Date    BUN 8 01/05/2019     Lab Results   Component Value Date    CREATININE 0.9 01/05/2019       Imaging Reviewed during this Office Visit:   (results were independently reviewed by physician and radiology report verified)    PAST MEDICAL, FAMILY AND SOCIAL HISTORY UPDATE:  Past Medical History:   Diagnosis Date    Azoospermia     Hypogonadism      Past Surgical History:   Procedure Laterality Date    CHOLECYSTECTOMY      stent placement as well. Family History   Problem Relation Age of Onset    Cancer Mother         hodgkins    No Known Problems Father      Outpatient Medications Marked as Taking for the 8/3/22 encounter (Office Visit) with Francisco Green MD   Medication Sig Dispense Refill    ANDRODERM 4 MG/24HR PT24 Place 8 mg onto the skin daily for 30 days. Two patches daily.  60 patch 5    Fexofenadine HCl (ALLEGRA PO) Take by mouth daily      ibuprofen (ADVIL;MOTRIN) 200 MG tablet Take 200 mg by mouth every 6 hours as needed for Pain         Patient has no known allergies. Social History     Tobacco Use   Smoking Status Never   Smokeless Tobacco Never       Social History     Substance and Sexual Activity   Alcohol Use No       REVIEW OF SYSTEMS:  Constitutional: negative  Eyes: negative  Respiratory: negative  Cardiovascular: negative  Gastrointestinal: negative  Musculoskeletal: negative  Genitourinary: negative except for what is in HPI  Skin: negative   Neurological: negative  Hematological/Lymphatic: negative  Psychological: negative    Physical Exam:      Vitals:    08/03/22 0749   BP: 117/80   Pulse: 77     Patient is a 40 y.o. male in no acute distress and alert and oriented to person, place and time. NAD, A/o  Non labored respiration  Normal peripheral pulses  Skin- warm and dry  Psych- normal mood and affect      Assessment and Plan      1. Hypogonadism in male             Plan:      No follow-ups on file. T still pending. Will review when it results. See back in 6 months  HCT normal at 44.    Refilled T.         Dr. Edel Flornece MD show

## 2024-05-22 ENCOUNTER — OFFICE VISIT (OUTPATIENT)
Dept: ENT CLINIC | Age: 44
End: 2024-05-22
Payer: COMMERCIAL

## 2024-05-22 VITALS
SYSTOLIC BLOOD PRESSURE: 122 MMHG | HEART RATE: 72 BPM | DIASTOLIC BLOOD PRESSURE: 78 MMHG | BODY MASS INDEX: 35.33 KG/M2 | TEMPERATURE: 97.2 F | HEIGHT: 70 IN | OXYGEN SATURATION: 96 % | WEIGHT: 246.8 LBS | RESPIRATION RATE: 18 BRPM

## 2024-05-22 DIAGNOSIS — G47.33 OSA (OBSTRUCTIVE SLEEP APNEA): ICD-10-CM

## 2024-05-22 DIAGNOSIS — Z01.818 PRE-OP TESTING: Primary | ICD-10-CM

## 2024-05-22 DIAGNOSIS — Z78.9 INTOLERANCE OF CONTINUOUS POSITIVE AIRWAY PRESSURE (CPAP) VENTILATION: ICD-10-CM

## 2024-05-22 DIAGNOSIS — E66.01 MORBID OBESITY (HCC): Primary | Chronic | ICD-10-CM

## 2024-05-22 DIAGNOSIS — J34.2 NASAL SEPTAL DEVIATION: ICD-10-CM

## 2024-05-22 DIAGNOSIS — J34.89 NASAL OBSTRUCTION: ICD-10-CM

## 2024-05-22 DIAGNOSIS — J34.3 HYPERTROPHY OF BOTH INFERIOR NASAL TURBINATES: ICD-10-CM

## 2024-05-22 PROCEDURE — 1036F TOBACCO NON-USER: CPT | Performed by: OTOLARYNGOLOGY

## 2024-05-22 PROCEDURE — G8427 DOCREV CUR MEDS BY ELIG CLIN: HCPCS | Performed by: OTOLARYNGOLOGY

## 2024-05-22 PROCEDURE — G8417 CALC BMI ABV UP PARAM F/U: HCPCS | Performed by: OTOLARYNGOLOGY

## 2024-05-22 PROCEDURE — 99204 OFFICE O/P NEW MOD 45 MIN: CPT | Performed by: OTOLARYNGOLOGY

## 2024-05-22 NOTE — PROGRESS NOTES
[facility]  [facility]  [address]  [address]  [facility] [address]  Dept: [phone]  Dept Fax: [phone]  Loc: [phone]    Luis Carlos Ramos is a 44 y.o. male who was referred by Tanika Humphries APRN -* for:  Chief Complaint   Patient presents with    New Patient     Patient is here today as a new patient for evaluation of the Inspire. Patient states he has been losing weight recently and states he hasn't been snoring as much and yessi wants to talk today about maybe just getting another sleep study first to see if he has improved any.        HPI:     Luis Carlos Ramos is a 44 y.o. male with a several year history of obstructive sleep apnea symptoms associated with his morbid obesity.  He has been entirely unable to tolerate his CPAP and has been off CPAP now for several months.  He was sent for evaluation as possible candidate for inspire hypoglossal nerve stimulator placement.  He describes chronic nasal stuffiness that is worse on recumbency but has no history of nasal trauma.  He knows his nasal breathing is not fully normal.  When he was trying to use CPAP, airflow through his nose was never easy.  The pressure necessary to keep his airway open was very high and would be what awakened him and caused him to take the mask off usually within an hour and a half of recumbency.  He has lost weight despite this virtually untreated [place] condition of greater than 40 pounds in the last 9 months.  He is aiming for being in the low 200s within the next year.     History:     No Known Allergies  Current Outpatient Medications   Medication Sig Dispense Refill    Fexofenadine HCl (ALLEGRA PO) Take by mouth daily      ibuprofen (ADVIL;MOTRIN) 200 MG tablet Take 1 tablet by mouth every 6 hours as needed for Pain      Testosterone (ANDROGEL) 20.25 MG/ACT (1.62%) GEL gel Place 2 actuation onto the skin daily for 120 days. Max Daily Amount: 40.5 mg 75 g 5     No

## 2024-05-29 ENCOUNTER — OFFICE VISIT (OUTPATIENT)
Dept: PULMONOLOGY | Age: 44
End: 2024-05-29
Payer: COMMERCIAL

## 2024-05-29 VITALS
BODY MASS INDEX: 35.39 KG/M2 | WEIGHT: 247.2 LBS | HEART RATE: 72 BPM | TEMPERATURE: 97.9 F | DIASTOLIC BLOOD PRESSURE: 62 MMHG | OXYGEN SATURATION: 97 % | HEIGHT: 70 IN | SYSTOLIC BLOOD PRESSURE: 118 MMHG

## 2024-05-29 DIAGNOSIS — G47.33 OSA (OBSTRUCTIVE SLEEP APNEA): Primary | ICD-10-CM

## 2024-05-29 DIAGNOSIS — E66.9 OBESITY (BMI 30-39.9): ICD-10-CM

## 2024-05-29 DIAGNOSIS — R63.4 WEIGHT LOSS: ICD-10-CM

## 2024-05-29 PROCEDURE — 99213 OFFICE O/P EST LOW 20 MIN: CPT | Performed by: NURSE PRACTITIONER

## 2024-05-29 PROCEDURE — G8417 CALC BMI ABV UP PARAM F/U: HCPCS | Performed by: NURSE PRACTITIONER

## 2024-05-29 PROCEDURE — 1036F TOBACCO NON-USER: CPT | Performed by: NURSE PRACTITIONER

## 2024-05-29 PROCEDURE — G8427 DOCREV CUR MEDS BY ELIG CLIN: HCPCS | Performed by: NURSE PRACTITIONER

## 2024-05-29 ASSESSMENT — ENCOUNTER SYMPTOMS
DIARRHEA: 0
RESPIRATORY NEGATIVE: 1
STRIDOR: 0
GASTROINTESTINAL NEGATIVE: 1
VOMITING: 0
ALLERGIC/IMMUNOLOGIC NEGATIVE: 1
NAUSEA: 0
EYES NEGATIVE: 1
WHEEZING: 0
CHEST TIGHTNESS: 0

## 2024-05-29 NOTE — PROGRESS NOTES
[facility]      Luis Carlos Ramos         [id number]  5/29/2024   Chief Complaint   Patient presents with    Follow-up     [place] 3 month f/u after referred to ENT for inspire on 5/22/24. Pt is not in PAP therapy.        Pt of Dr. Nunez    PAP Download:   Original or initial AHI: 30.8     Date of initial study: 2/28/23          Neck Size: 21.25  Mallampati 4  ESS:  6  SAQLI: 89    Here is a scan of the most recent download:  Pt not on PAP therapy.     Presentation:   Luis Carlos presents for 7 months sleep medicine follow up for obstructive sleep apnea  Since the last visit, Luis Carlos has not been using his PAP therapy and recently had a office consult with Dr. Whitaker's for possible Inspire therapy.  Since last visit in the office with me patient is now down almost 50 lbs.   Patient is planned to have DISE procedure and nasal surgery due to deviation- he states this will be around Labor Day   BMI today 35  No sleep medication use at night     Progress History:   Since last visit any new medical issues? Yes- following with Dr. Fuentes  for chronic cough   Any new sleep medicines? no  Trouble Falling Asleep No  Trouble Staying Asleep No  Snoring - unsure     Equipment issues:  -NA    Review of Systems -   Review of Systems   Constitutional: Negative.  Negative for chills, fever and unexpected weight change.   HENT: Negative.     Eyes: Negative.    Respiratory: Negative.  Negative for chest tightness, wheezing and stridor.    Cardiovascular:  Negative for chest pain and leg swelling.   Gastrointestinal: Negative.  Negative for diarrhea, nausea and vomiting.   Endocrine: Negative.    Genitourinary: Negative.  Negative for dysuria.   Musculoskeletal: Negative.    Skin: Negative.    Allergic/Immunologic: Negative.    Neurological: Negative.    Hematological: Negative.    Psychiatric/Behavioral: Negative.          Physical Exam:    BMI:  Body mass index is 35.47 kg/m².    Wt Readings

## 2024-06-17 ENCOUNTER — NURSE ONLY (OUTPATIENT)
Dept: LAB | Age: 44
End: 2024-06-17

## 2024-06-17 DIAGNOSIS — E29.1 HYPOGONADISM IN MALE: ICD-10-CM

## 2024-06-17 DIAGNOSIS — Z12.5 PROSTATE CANCER SCREENING: ICD-10-CM

## 2024-06-17 LAB
ALBUMIN SERPL BCG-MCNC: 4.2 G/DL (ref 3.5–5.1)
ALP SERPL-CCNC: 67 U/L (ref 38–126)
ALT SERPL W/O P-5'-P-CCNC: 18 U/L (ref 11–66)
AST SERPL-CCNC: 13 U/L (ref 5–40)
BILIRUB CONJ SERPL-MCNC: 0.2 MG/DL (ref 0.1–13.8)
BILIRUB SERPL-MCNC: 0.6 MG/DL (ref 0.3–1.2)
HCT VFR BLD AUTO: 39.5 % (ref 42–52)
HGB BLD-MCNC: 13.4 GM/DL (ref 14–18)
PROT SERPL-MCNC: 7.5 G/DL (ref 6.1–8)
PSA SERPL-MCNC: 0.19 NG/ML (ref 0–1)

## 2024-06-18 LAB — TESTOST SERPL-MCNC: 97 NG/DL (ref 300–890)

## 2024-06-21 NOTE — PLAN OF CARE
Problem: Discharge Planning  Goal: Discharge to home or other facility with appropriate resources  2/4/2023 1133 by Maryjo Chew RN  Outcome: Progressing  Flowsheets (Taken 2/4/2023 1133)  Discharge to home or other facility with appropriate resources:   Identify barriers to discharge with patient and caregiver   Arrange for needed discharge resources and transportation as appropriate  2/4/2023 1119 by Maryjo Chew RN  Outcome: Progressing  Flowsheets (Taken 2/4/2023 1119)  Discharge to home or other facility with appropriate resources:   Identify barriers to discharge with patient and caregiver   Arrange for needed discharge resources and transportation as appropriate  2/4/2023 0258 by Concepcion Bashir RN  Outcome: Progressing  Flowsheets (Taken 2/4/2023 0258)  Discharge to home or other facility with appropriate resources:   Identify barriers to discharge with patient and caregiver   Arrange for needed discharge resources and transportation as appropriate   Identify discharge learning needs (meds, wound care, etc)   Refer to discharge planning if patient needs post-hospital services based on physician order or complex needs related to functional status, cognitive ability or social support system     Problem: Pain  Goal: Verbalizes/displays adequate comfort level or baseline comfort level  2/4/2023 1133 by Maryjo Chew RN  Outcome: Progressing  Flowsheets (Taken 2/4/2023 1133)  Verbalizes/displays adequate comfort level or baseline comfort level:   Encourage patient to monitor pain and request assistance   Assess pain using appropriate pain scale  Note: Tylenol and Motrin  2/4/2023 1119 by Maryjo Chew RN  Outcome: Progressing  Flowsheets (Taken 2/4/2023 1119)  Verbalizes/displays adequate comfort level or baseline comfort level:   Assess pain using appropriate pain scale   Administer analgesics based on type and severity of pain and evaluate response  2/4/2023 0258 by Concepcion Bashir RN  Outcome: What is the plan?  -Continue Anoro ellipta  -Continue Albuterol rescue inhaler as needed    -Complete CAT/CT scan ordered by Dr. Everett for Lung cancer screening given history of smoking  -Follow-up with Dr. Jo for Sleep medicine.    -Stop use of All tobacco products     Progressing  Flowsheets (Taken 2/4/2023 0258)  Verbalizes/displays adequate comfort level or baseline comfort level:   Encourage patient to monitor pain and request assistance   Assess pain using appropriate pain scale   Administer analgesics based on type and severity of pain and evaluate response   Implement non-pharmacological measures as appropriate and evaluate response   Notify Licensed Independent Practitioner if interventions unsuccessful or patient reports new pain     Problem: Respiratory - Adult  Goal: Achieves optimal ventilation and oxygenation  2/4/2023 1133 by Kamini Brooks RN  Outcome: Progressing  Flowsheets (Taken 2/4/2023 1133)  Achieves optimal ventilation and oxygenation:   Assess for changes in respiratory status   Assess for changes in mentation and behavior  2/4/2023 1119 by Kamini Brooks RN  Outcome: Progressing  Flowsheets (Taken 2/4/2023 1119)  Achieves optimal ventilation and oxygenation:   Assess for changes in respiratory status   Assess for changes in mentation and behavior  2/4/2023 0258 by Tasia Ochoa RN  Outcome: Progressing  Flowsheets (Taken 2/4/2023 0258)  Achieves optimal ventilation and oxygenation:   Assess for changes in respiratory status   Assess for changes in mentation and behavior   Position to facilitate oxygenation and minimize respiratory effort   Oxygen supplementation based on oxygen saturation or arterial blood gases   Assess and instruct to report shortness of breath or any respiratory difficulty     Problem: Skin/Tissue Integrity - Adult  Goal: Skin integrity remains intact  2/4/2023 1133 by Kamini Brooks RN  Outcome: Progressing  Flowsheets (Taken 2/4/2023 1133)  Skin Integrity Remains Intact:   Monitor for areas of redness and/or skin breakdown   Assess vascular access sites hourly  2/4/2023 1119 by Kamini Brooks RN  Outcome: Progressing  Flowsheets (Taken 2/4/2023 1119)  Skin Integrity Remains Intact:   Monitor for areas of redness and/or skin breakdown   Assess vascular access sites hourly  2/4/2023 0258 by Sofía Thomas RN  Outcome: Progressing  Flowsheets (Taken 2/4/2023 0258)  Skin Integrity Remains Intact:   Monitor for areas of redness and/or skin breakdown   Assess vascular access sites hourly     Problem: Infection - Adult  Goal: Absence of infection at discharge  2/4/2023 1133 by Escobar Short RN  Outcome: Progressing  Flowsheets (Taken 2/4/2023 1133)  Absence of infection at discharge:   Assess and monitor for signs and symptoms of infection   Monitor lab/diagnostic results  2/4/2023 1119 by Escobar Short RN  Outcome: Progressing  Flowsheets (Taken 2/4/2023 1119)  Absence of infection at discharge:   Assess and monitor for signs and symptoms of infection   Monitor lab/diagnostic results  2/4/2023 0258 by Sofía Thomas RN  Outcome: Progressing  Flowsheets (Taken 2/4/2023 0258)  Absence of infection at discharge:   Assess and monitor for signs and symptoms of infection   Monitor lab/diagnostic results   Monitor all insertion sites i.e., indwelling lines, tubes and drains   Administer medications as ordered  Goal: Absence of infection during hospitalization  2/4/2023 1133 by Escobar Short RN  Outcome: Progressing  Flowsheets (Taken 2/4/2023 1133)  Absence of infection during hospitalization:   Assess and monitor for signs and symptoms of infection   Monitor lab/diagnostic results  2/4/2023 1119 by Escobar Short RN  Outcome: Progressing  Flowsheets (Taken 2/4/2023 1119)  Absence of infection during hospitalization:   Assess and monitor for signs and symptoms of infection   Monitor lab/diagnostic results  2/4/2023 0258 by Sofía Thomas RN  Outcome: Progressing  Flowsheets (Taken 2/4/2023 0258)  Absence of infection during hospitalization:   Assess and monitor for signs and symptoms of infection   Monitor lab/diagnostic results   Monitor all insertion sites i.e., indwelling lines, tubes and drains   Administer medications as ordered   Instruct and encourage patient and family to use good hand hygiene technique     Problem: Safety - Adult  Goal: Free from fall injury  2/4/2023 1133 by Adri Harrington RN  Outcome: Progressing  Flowsheets (Taken 2/4/2023 1133)  Free From Fall Injury: Instruct family/caregiver on patient safety  2/4/2023 1119 by Adri Harrington RN  Outcome: Progressing  Flowsheets (Taken 2/4/2023 1119)  Free From Fall Injury: Instruct family/caregiver on patient safety  2/4/2023 0258 by Hope Ramirez RN  Outcome: Progressing  Flowsheets (Taken 2/4/2023 0258)  Free From Fall Injury:   Instruct family/caregiver on patient safety   Based on caregiver fall risk screen, instruct family/caregiver to ask for assistance with transferring infant if caregiver noted to have fall risk factors   Care plan reviewed with patient. Patient verbalize understanding of the plan of care and contribute to goal setting.

## 2024-06-25 ENCOUNTER — TELEMEDICINE (OUTPATIENT)
Dept: UROLOGY | Age: 44
End: 2024-06-25

## 2024-06-25 DIAGNOSIS — R68.82 LOW LIBIDO: ICD-10-CM

## 2024-06-25 DIAGNOSIS — E29.1 HYPOGONADISM IN MALE: Primary | ICD-10-CM

## 2024-06-25 PROCEDURE — 99442 PR PHYS/QHP TELEPHONE EVALUATION 11-20 MIN: CPT

## 2024-06-25 RX ORDER — TESTOSTERONE 16.2 MG/G
4 GEL TRANSDERMAL DAILY
Qty: 150 G | Refills: 5 | Status: SHIPPED | OUTPATIENT
Start: 2024-06-25 | End: 2024-12-22

## 2024-06-25 NOTE — PROGRESS NOTES
Scheduled Telephone Encounter  8/2/2022  [facility]  Kyree Mariscal PA-C  Urology   Reason for Visit: Low Testosterone     Progress Notes  Kyree Mariscal PA-C   Urology  Luis Carlos Ramos is a 44-year-old male being evaluated via telephone on 6/25/24 for low testosterone.     HPI  Patient remains subtherapeutic in terms of his level but continues to state that he has noticed a large difference in symptoms with treatment.     Increased to 1.62% potency (2 actuations) of the Androgel at last visit which did increase his level from 27 to 97.     Assessment/Plan  Low Testosterone  Decreased Libido  - Subtherapeutic levels. Symptoms controlled, however. Remains uninterested in switching to IM injections, Xyosted, or Testopel.  - Increase to 4 actuations of 1.62% gel daily.  - Recheck labs in 6 months to monitor therapy and avoid toxicity.     Total Time: 12 Minutes     Luis Carlos Ramos was evaluated through a synchronous (real-time) audio encounter. Patient identification was verified at the start of the visit. He/She (or guardian if applicable) is aware that this is a billable service, which includes applicable co-pays. This visit was conducted with the patient's (and/or legal guardian's) verbal consent. He/She has not had a related appointment within my department in the past 7 days or scheduled within the next 24 hours.   The patient was located at Home.  The provider was located at Facility (Appt Dept): [address] [place] [address].     Note: Not billable if this call serves to triage the patient into an appointment for the relevant concern       Kyree Mariscal PA-C  Urology

## 2024-07-30 ENCOUNTER — HOSPITAL ENCOUNTER (OUTPATIENT)
Dept: CT IMAGING | Age: 44
Discharge: HOME | End: 2024-07-30
Attending: INTERNAL MEDICINE
Payer: COMMERCIAL

## 2024-07-30 DIAGNOSIS — J90 PLEURAL EFFUSION, RIGHT: ICD-10-CM

## 2024-07-30 DIAGNOSIS — R05.3 CHRONIC COUGH: ICD-10-CM

## 2024-07-30 DIAGNOSIS — R06.02 SHORTNESS OF BREATH: ICD-10-CM

## 2024-07-30 PROCEDURE — 71250 CT THORAX DX C-: CPT

## 2024-08-06 ENCOUNTER — TELEPHONE (OUTPATIENT)
Dept: ENT CLINIC | Age: 44
End: 2024-08-06

## 2024-08-06 NOTE — TELEPHONE ENCOUNTER
Patients insurance needs add'l info regarding any treatment for his nasal septal deviation. I lvm for patient to cb so we may discuss.

## 2024-08-07 NOTE — TELEPHONE ENCOUNTER
Patient returned my call.  He has never used Afrin, Flonase, Nasonex or any other nasal spray that he can recall.  He has never used a nasal lavage, rinse kit, or ann pot. He has never had treatment for any sinus infections.  He has never been formally tested for allergies.  I will provide this information to his insurance company.

## 2024-08-26 ENCOUNTER — TELEPHONE (OUTPATIENT)
Dept: ENT CLINIC | Age: 44
End: 2024-08-26

## 2024-08-29 RX ORDER — FLUTICASONE PROPIONATE 50 MCG
1 SPRAY, SUSPENSION (ML) NASAL 2 TIMES DAILY
Qty: 16 G | Refills: 2 | Status: SHIPPED | OUTPATIENT
Start: 2024-08-29

## 2024-09-04 ENCOUNTER — OFFICE VISIT (OUTPATIENT)
Dept: PULMONOLOGY | Age: 44
End: 2024-09-04
Payer: COMMERCIAL

## 2024-09-04 VITALS
TEMPERATURE: 98.4 F | OXYGEN SATURATION: 98 % | BODY MASS INDEX: 35.22 KG/M2 | SYSTOLIC BLOOD PRESSURE: 124 MMHG | HEIGHT: 70 IN | HEART RATE: 67 BPM | WEIGHT: 246 LBS | DIASTOLIC BLOOD PRESSURE: 80 MMHG

## 2024-09-04 DIAGNOSIS — R91.1 LUNG NODULE: ICD-10-CM

## 2024-09-04 DIAGNOSIS — J90 PLEURAL EFFUSION, RIGHT: Primary | ICD-10-CM

## 2024-09-04 PROCEDURE — 99213 OFFICE O/P EST LOW 20 MIN: CPT | Performed by: INTERNAL MEDICINE

## 2024-09-04 ASSESSMENT — ENCOUNTER SYMPTOMS
COUGH: 1
SHORTNESS OF BREATH: 0
SORE THROAT: 0
WHEEZING: 0
HEMOPTYSIS: 0

## 2024-09-04 NOTE — PROGRESS NOTES
[facility]    Patient: JOVITA HENSLEY, 44 y.o.   : 1980         Subjective     Chief Complaint   Patient presents with    Follow-up     7 month follow up with CT chest  and Echo        Cough  This is a chronic problem. Episode onset: stopped antibiotics in december. The problem has been resolved. Pertinent negatives include no chest pain, chills, fever, hemoptysis, rash, sore throat, shortness of breath, sweats, weight loss or wheezing. His past medical history is significant for environmental allergies and pneumonia. There is no history of asthma, COPD or emphysema. empyema in past, resolved     Had a pneumonia with an empyema early  that was treated with chest tube and antibiotics.       Social History  Patient job history included [employer]  denies living on a farm that primarily raised crops, livestock or combination  denies to active tobacco smoking   admits to exposure to pets/animals at home.  denies exposure to tuberculosis.    Past Medical hx   PMH:  Past Medical History:   Diagnosis Date    Azoospermia     Hypogonadism      SURGICAL HISTORY:  Past Surgical History:   Procedure Laterality Date    CHOLECYSTECTOMY      stent placement as well.    CT GUIDED CHEST TUBE  2023    CT GUIDED CHEST TUBE 2023 [facility] CT SCAN     SOCIAL HISTORY:  Social History     Tobacco Use    Smoking status: Never    Smokeless tobacco: Never    Tobacco comments:     Never smoker   Vaping Use    Vaping status: Never Used   Substance Use Topics    Alcohol use: No    Drug use: No     ALLERGIES:No Known Allergies  FAMILY HISTORY:  Family History   Problem Relation Age of Onset    Cancer Mother         hodgkins    No Known Problems Father      CURRENT MEDICATIONS:  Current Outpatient Medications   Medication Sig Dispense Refill    fluticasone (FLONASE) 50 MCG/ACT nasal spray 1 spray by Each Nostril route in the morning and at bedtime 16 g 2    Testosterone

## 2024-10-29 ENCOUNTER — OFFICE VISIT (OUTPATIENT)
Dept: ENT CLINIC | Age: 44
End: 2024-10-29
Payer: COMMERCIAL

## 2024-10-29 VITALS
TEMPERATURE: 97.4 F | HEART RATE: 82 BPM | SYSTOLIC BLOOD PRESSURE: 124 MMHG | BODY MASS INDEX: 36.13 KG/M2 | OXYGEN SATURATION: 98 % | WEIGHT: 252.4 LBS | RESPIRATION RATE: 16 BRPM | DIASTOLIC BLOOD PRESSURE: 68 MMHG | HEIGHT: 70 IN

## 2024-10-29 DIAGNOSIS — J34.89 NASAL OBSTRUCTION: ICD-10-CM

## 2024-10-29 DIAGNOSIS — G47.33 OSA (OBSTRUCTIVE SLEEP APNEA): Primary | ICD-10-CM

## 2024-10-29 DIAGNOSIS — J34.2 NASAL SEPTAL DEVIATION: ICD-10-CM

## 2024-10-29 DIAGNOSIS — J34.829 NASAL VALVE COLLAPSE: ICD-10-CM

## 2024-10-29 DIAGNOSIS — Z78.9 INTOLERANCE OF CONTINUOUS POSITIVE AIRWAY PRESSURE (CPAP) VENTILATION: ICD-10-CM

## 2024-10-29 DIAGNOSIS — J34.3 HYPERTROPHY OF BOTH INFERIOR NASAL TURBINATES: ICD-10-CM

## 2024-10-29 DIAGNOSIS — Z01.818 PRE-OP TESTING: Primary | ICD-10-CM

## 2024-10-29 DIAGNOSIS — G47.33 OSA (OBSTRUCTIVE SLEEP APNEA): ICD-10-CM

## 2024-10-29 PROCEDURE — 1036F TOBACCO NON-USER: CPT | Performed by: OTOLARYNGOLOGY

## 2024-10-29 PROCEDURE — G8427 DOCREV CUR MEDS BY ELIG CLIN: HCPCS | Performed by: OTOLARYNGOLOGY

## 2024-10-29 PROCEDURE — 99213 OFFICE O/P EST LOW 20 MIN: CPT | Performed by: OTOLARYNGOLOGY

## 2024-10-29 PROCEDURE — G8417 CALC BMI ABV UP PARAM F/U: HCPCS | Performed by: OTOLARYNGOLOGY

## 2024-10-29 PROCEDURE — G8484 FLU IMMUNIZE NO ADMIN: HCPCS | Performed by: OTOLARYNGOLOGY

## 2024-10-29 NOTE — PROGRESS NOTES
[facility] [place] [facility]  [facility] EAR, NOSE AND THROAT  [address]  [address]  [facility] [address]  Dept: [phone]  Dept Fax: [phone]  Loc: [phone]    Luis Carlos Ramos is a 44 y.o. male who was referred by No ref. provider found for:  Chief Complaint   Patient presents with    Follow-up     Patient is here for f/u after trial of Flonase scheduled for surgery 12/03 DISE, septo, inf turbs, nasal valve. Patient needs to see Jenelle after visit.        HPI:     Luis Carlos Ramos is a 44 y.o. male who returns after a 2-month interval of Flonase daily without any meaningful change in his nasal patency.  He is also trying saline and to this point he has not noticed any change.  An oral appliance has been mentioned to him and he is convinced he would not be able to tolerate that in the same way that he is not tolerating CPAP as something that he is confident he will not tolerate.     History:     No Known Allergies  Current Outpatient Medications   Medication Sig Dispense Refill    fluticasone (FLONASE) 50 MCG/ACT nasal spray 1 spray by Each Nostril route in the morning and at bedtime 16 g 2    Testosterone (ANDROGEL) 20.25 MG/ACT (1.62%) GEL gel Place 4 actuation onto the skin daily for 180 days. Max Daily Amount: 5,000 mg 150 g 5    Fexofenadine HCl (ALLEGRA PO) Take by mouth daily      ibuprofen (ADVIL;MOTRIN) 200 MG tablet Take 1 tablet by mouth every 6 hours as needed for Pain       No current facility-administered medications for this visit.     Past Medical History:   Diagnosis Date    Azoospermia     Hypogonadism       Past Surgical History:   Procedure Laterality Date    CHOLECYSTECTOMY      stent placement as well.    CT GUIDED CHEST TUBE  2/1/2023    CT GUIDED CHEST TUBE 2/1/2023 [facility] CT SCAN     Family History   Problem Relation Age of Onset    Cancer Mother         hodgkins    No Known Problems Father      Social History     Tobacco Use    Smoking status: Never

## 2024-11-25 NOTE — PROGRESS NOTES
PAT Call Date:  11/25   Surgery Date: 12/3    Surgeon: Keri   Surgery: septo+endo sleep     Any Isolation Precautions? No      Type of Isolation Precaution: Not Applicable   Is patient from a nursing home? No  Name of Nursing Home:   Any equipment assist needed for moving patient? No   Type of Equipment: Not Applicable  Patient last weight: 247 lb     Hard Copy on Chart  In EPIC Pending/Notes   Consent -   Within 30 days; signed, dated & timed by patient and physician     [] On Arrival     [] Blood      [] DNR   H&P -   Within 30 days  10/29  [] Physician To Do     Clearance -      []Medical     []Cardiac     [] Pulmonary    Orders -   Signed and Dated      [] Physician To Do    Labs -   Within 3 months   ?  [] CBC    [] BMP   [] GFR   [] INR    [] PTT    [] Urine    [] Liver Enzymes    [] MRSA Nasal      Others:     Radiology Studies -   Within 1 year  12/15/23    7/30  [x] Chest X-Ray-ok   [] MRI    [x] CT chest-ok   [] Vascular   [] US     Pulmonary -     [x] [place]   [] CPAP     Cardiac Workup -   Stress Test, Echo, Cath within 18 months  Ordered      1/26  [x] EKG      [] Cath                 [] Stress Test                      [x] Echo/MILLICENT 60-65%   [] CABG   [] Holter Monitor      [] Pacemaker/ICD        Brand:        Where does patient have checked:         Last check:         Rep Notified:

## 2024-11-26 ENCOUNTER — HOSPITAL ENCOUNTER (OUTPATIENT)
Age: 44
Discharge: HOME | End: 2024-11-26
Payer: COMMERCIAL

## 2024-11-26 DIAGNOSIS — E29.1 HYPOGONADISM IN MALE: ICD-10-CM

## 2024-11-26 DIAGNOSIS — G47.33 OSA (OBSTRUCTIVE SLEEP APNEA): ICD-10-CM

## 2024-11-26 DIAGNOSIS — R68.82 LOW LIBIDO: ICD-10-CM

## 2024-11-26 DIAGNOSIS — Z01.818 PRE-OP TESTING: ICD-10-CM

## 2024-11-26 DIAGNOSIS — J34.2 NASAL SEPTAL DEVIATION: ICD-10-CM

## 2024-11-26 LAB
ALBUMIN SERPL BCG-MCNC: 4.3 G/DL (ref 3.5–5.1)
ALP SERPL-CCNC: 72 U/L (ref 38–126)
ALT SERPL W/O P-5'-P-CCNC: 24 U/L (ref 11–66)
ANION GAP SERPL CALC-SCNC: 11 MEQ/L (ref 8–16)
AST SERPL-CCNC: 21 U/L (ref 5–40)
BASOPHILS ABSOLUTE: 0 THOU/MM3 (ref 0–0.1)
BASOPHILS NFR BLD AUTO: 0.3 %
BILIRUB CONJ SERPL-MCNC: 0.2 MG/DL (ref 0.1–13.8)
BILIRUB SERPL-MCNC: 0.7 MG/DL (ref 0.3–1.2)
BUN SERPL-MCNC: 15 MG/DL (ref 7–22)
CALCIUM SERPL-MCNC: 9.6 MG/DL (ref 8.5–10.5)
CHLORIDE SERPL-SCNC: 104 MEQ/L (ref 98–111)
CO2 SERPL-SCNC: 25 MEQ/L (ref 23–33)
CREAT SERPL-MCNC: 0.7 MG/DL (ref 0.4–1.2)
DEPRECATED RDW RBC AUTO: 37.2 FL (ref 35–45)
EKG ATRIAL RATE: 79 BPM
EKG P AXIS: 29 DEGREES
EKG P-R INTERVAL: 170 MS
EKG Q-T INTERVAL: 388 MS
EKG QRS DURATION: 104 MS
EKG QTC CALCULATION (BAZETT): 444 MS
EKG R AXIS: 27 DEGREES
EKG T AXIS: 38 DEGREES
EKG VENTRICULAR RATE: 79 BPM
EOSINOPHIL NFR BLD AUTO: 3.3 %
EOSINOPHILS ABSOLUTE: 0.2 THOU/MM3 (ref 0–0.4)
ERYTHROCYTE [DISTWIDTH] IN BLOOD BY AUTOMATED COUNT: 12.3 % (ref 11.5–14.5)
GFR SERPL CREATININE-BSD FRML MDRD: > 90 ML/MIN/1.73M2
GLUCOSE SERPL-MCNC: 76 MG/DL (ref 70–108)
HCT VFR BLD AUTO: 40 % (ref 42–52)
HGB BLD-MCNC: 13.7 GM/DL (ref 14–18)
IMM GRANULOCYTES # BLD AUTO: 0.04 THOU/MM3 (ref 0–0.07)
IMM GRANULOCYTES NFR BLD AUTO: 0.7 %
LYMPHOCYTES ABSOLUTE: 2.1 THOU/MM3 (ref 1–4.8)
LYMPHOCYTES NFR BLD AUTO: 35.1 %
MCH RBC QN AUTO: 28.3 PG (ref 26–33)
MCHC RBC AUTO-ENTMCNC: 34.3 GM/DL (ref 32.2–35.5)
MCV RBC AUTO: 82.6 FL (ref 80–94)
MONOCYTES ABSOLUTE: 0.6 THOU/MM3 (ref 0.4–1.3)
MONOCYTES NFR BLD AUTO: 9.5 %
NEUTROPHILS ABSOLUTE: 3.1 THOU/MM3 (ref 1.8–7.7)
NEUTROPHILS NFR BLD AUTO: 51.1 %
NRBC BLD AUTO-RTO: 0 /100 WBC
PLATELET # BLD AUTO: 254 THOU/MM3 (ref 130–400)
PMV BLD AUTO: 9.9 FL (ref 9.4–12.4)
POTASSIUM SERPL-SCNC: 4.3 MEQ/L (ref 3.5–5.2)
PROT SERPL-MCNC: 7.6 G/DL (ref 6.1–8)
RBC # BLD AUTO: 4.84 MILL/MM3 (ref 4.7–6.1)
SODIUM SERPL-SCNC: 140 MEQ/L (ref 135–145)
TESTOST SERPL-MCNC: 98 NG/DL (ref 249–836)
WBC # BLD AUTO: 6.1 THOU/MM3 (ref 4.8–10.8)

## 2024-11-26 PROCEDURE — 80053 COMPREHEN METABOLIC PANEL: CPT

## 2024-11-26 PROCEDURE — 93010 ELECTROCARDIOGRAM REPORT: CPT | Performed by: INTERNAL MEDICINE

## 2024-11-26 PROCEDURE — 85025 COMPLETE CBC W/AUTO DIFF WBC: CPT

## 2024-11-26 PROCEDURE — 82248 BILIRUBIN DIRECT: CPT

## 2024-11-26 PROCEDURE — 36415 COLL VENOUS BLD VENIPUNCTURE: CPT

## 2024-11-26 PROCEDURE — 93005 ELECTROCARDIOGRAM TRACING: CPT

## 2024-11-26 PROCEDURE — 84403 ASSAY OF TOTAL TESTOSTERONE: CPT

## 2024-11-27 ENCOUNTER — PREP FOR PROCEDURE (OUTPATIENT)
Dept: ENT CLINIC | Age: 44
End: 2024-11-27

## 2024-11-27 NOTE — PROGRESS NOTES
Sent abnormal EKG 11-26-24to anesthesia for review.  No cardiac clearance ordered.  Await response

## 2024-12-02 RX ORDER — SODIUM CHLORIDE 9 MG/ML
INJECTION, SOLUTION INTRAVENOUS PRN
Status: CANCELLED | OUTPATIENT
Start: 2024-12-02

## 2024-12-02 RX ORDER — SODIUM CHLORIDE 0.9 % (FLUSH) 0.9 %
5-40 SYRINGE (ML) INJECTION EVERY 12 HOURS SCHEDULED
Status: CANCELLED | OUTPATIENT
Start: 2024-12-02

## 2024-12-02 RX ORDER — SODIUM CHLORIDE 0.9 % (FLUSH) 0.9 %
5-40 SYRINGE (ML) INJECTION PRN
Status: CANCELLED | OUTPATIENT
Start: 2024-12-02

## 2024-12-03 ENCOUNTER — HOSPITAL ENCOUNTER (OUTPATIENT)
Age: 44
Setting detail: OUTPATIENT SURGERY
Discharge: HOME | End: 2024-12-03
Attending: OTOLARYNGOLOGY | Admitting: OTOLARYNGOLOGY
Payer: COMMERCIAL

## 2024-12-03 ENCOUNTER — ANESTHESIA (OUTPATIENT)
Dept: OPERATING ROOM | Age: 44
End: 2024-12-03
Payer: COMMERCIAL

## 2024-12-03 ENCOUNTER — ANESTHESIA EVENT (OUTPATIENT)
Dept: OPERATING ROOM | Age: 44
End: 2024-12-03
Payer: COMMERCIAL

## 2024-12-03 VITALS
HEIGHT: 70 IN | HEART RATE: 80 BPM | OXYGEN SATURATION: 97 % | DIASTOLIC BLOOD PRESSURE: 85 MMHG | BODY MASS INDEX: 37.96 KG/M2 | TEMPERATURE: 97 F | RESPIRATION RATE: 18 BRPM | SYSTOLIC BLOOD PRESSURE: 165 MMHG | WEIGHT: 265.13 LBS

## 2024-12-03 DIAGNOSIS — Z78.9 INTOLERANCE OF CONTINUOUS POSITIVE AIRWAY PRESSURE (CPAP) VENTILATION: ICD-10-CM

## 2024-12-03 DIAGNOSIS — J34.3 HYPERTROPHY OF BOTH INFERIOR NASAL TURBINATES: ICD-10-CM

## 2024-12-03 DIAGNOSIS — E66.01 MORBID OBESITY: ICD-10-CM

## 2024-12-03 DIAGNOSIS — G89.18 ACUTE POST-OPERATIVE PAIN: Primary | ICD-10-CM

## 2024-12-03 DIAGNOSIS — J34.89 NASAL OBSTRUCTION: ICD-10-CM

## 2024-12-03 DIAGNOSIS — J34.2 NASAL SEPTAL DEVIATION: ICD-10-CM

## 2024-12-03 PROBLEM — G47.33 OBSTRUCTIVE SLEEP APNEA: Status: ACTIVE | Noted: 2024-12-03

## 2024-12-03 PROCEDURE — 2500000003 HC RX 250 WO HCPCS: Performed by: NURSE ANESTHETIST, CERTIFIED REGISTERED

## 2024-12-03 PROCEDURE — 3700000000 HC ANESTHESIA ATTENDED CARE: Performed by: OTOLARYNGOLOGY

## 2024-12-03 PROCEDURE — 7100000000 HC PACU RECOVERY - FIRST 15 MIN: Performed by: OTOLARYNGOLOGY

## 2024-12-03 PROCEDURE — 7100000010 HC PHASE II RECOVERY - FIRST 15 MIN: Performed by: OTOLARYNGOLOGY

## 2024-12-03 PROCEDURE — 2580000003 HC RX 258: Performed by: NURSE PRACTITIONER

## 2024-12-03 PROCEDURE — 3600000004 HC SURGERY LEVEL 4 BASE: Performed by: OTOLARYNGOLOGY

## 2024-12-03 PROCEDURE — 3600000014 HC SURGERY LEVEL 4 ADDTL 15MIN: Performed by: OTOLARYNGOLOGY

## 2024-12-03 PROCEDURE — 2709999900 HC NON-CHARGEABLE SUPPLY: Performed by: OTOLARYNGOLOGY

## 2024-12-03 PROCEDURE — APPNB45 APP NON BILLABLE 31-45 MINUTES: Performed by: NURSE PRACTITIONER

## 2024-12-03 PROCEDURE — 6360000002 HC RX W HCPCS: Performed by: NURSE PRACTITIONER

## 2024-12-03 PROCEDURE — 7100000011 HC PHASE II RECOVERY - ADDTL 15 MIN: Performed by: OTOLARYNGOLOGY

## 2024-12-03 PROCEDURE — C1601 HC ENDOSCOPE, PULM, IMAGING/ILLUMINATION DEVICE: HCPCS | Performed by: OTOLARYNGOLOGY

## 2024-12-03 PROCEDURE — 6360000002 HC RX W HCPCS: Performed by: OTOLARYNGOLOGY

## 2024-12-03 PROCEDURE — 88305 TISSUE EXAM BY PATHOLOGIST: CPT

## 2024-12-03 PROCEDURE — 6360000002 HC RX W HCPCS: Performed by: NURSE ANESTHETIST, CERTIFIED REGISTERED

## 2024-12-03 PROCEDURE — 2720000010 HC SURG SUPPLY STERILE: Performed by: OTOLARYNGOLOGY

## 2024-12-03 PROCEDURE — 88300 SURGICAL PATH GROSS: CPT

## 2024-12-03 PROCEDURE — 6370000000 HC RX 637 (ALT 250 FOR IP): Performed by: NURSE ANESTHETIST, CERTIFIED REGISTERED

## 2024-12-03 PROCEDURE — 7100000001 HC PACU RECOVERY - ADDTL 15 MIN: Performed by: OTOLARYNGOLOGY

## 2024-12-03 PROCEDURE — 3700000001 HC ADD 15 MINUTES (ANESTHESIA): Performed by: OTOLARYNGOLOGY

## 2024-12-03 PROCEDURE — 6370000000 HC RX 637 (ALT 250 FOR IP): Performed by: OTOLARYNGOLOGY

## 2024-12-03 PROCEDURE — 2580000003 HC RX 258: Performed by: OTOLARYNGOLOGY

## 2024-12-03 RX ORDER — DEXTROSE MONOHYDRATE 100 MG/ML
INJECTION, SOLUTION INTRAVENOUS CONTINUOUS PRN
Status: CANCELLED | OUTPATIENT
Start: 2024-12-03

## 2024-12-03 RX ORDER — LIDOCAINE HYDROCHLORIDE 40 MG/ML
INJECTION, SOLUTION RETROBULBAR PRN
Status: DISCONTINUED | OUTPATIENT
Start: 2024-12-03 | End: 2024-12-03 | Stop reason: ALTCHOICE

## 2024-12-03 RX ORDER — TRANEXAMIC ACID 100 MG/ML
INJECTION, SOLUTION INTRAVENOUS
Status: DISCONTINUED | OUTPATIENT
Start: 2024-12-03 | End: 2024-12-03 | Stop reason: SDUPTHER

## 2024-12-03 RX ORDER — LABETALOL HYDROCHLORIDE 5 MG/ML
10 INJECTION, SOLUTION INTRAVENOUS
Status: CANCELLED | OUTPATIENT
Start: 2024-12-03

## 2024-12-03 RX ORDER — ONDANSETRON 2 MG/ML
INJECTION INTRAMUSCULAR; INTRAVENOUS
Status: DISCONTINUED | OUTPATIENT
Start: 2024-12-03 | End: 2024-12-03 | Stop reason: SDUPTHER

## 2024-12-03 RX ORDER — NALOXONE HYDROCHLORIDE 0.4 MG/ML
INJECTION, SOLUTION INTRAMUSCULAR; INTRAVENOUS; SUBCUTANEOUS PRN
Status: CANCELLED | OUTPATIENT
Start: 2024-12-03

## 2024-12-03 RX ORDER — CIPROFLOXACIN 500 MG/1
500 TABLET, FILM COATED ORAL 2 TIMES DAILY
Qty: 20 TABLET | Refills: 0 | Status: SHIPPED | OUTPATIENT
Start: 2024-12-03 | End: 2024-12-13

## 2024-12-03 RX ORDER — DROPERIDOL 2.5 MG/ML
0.62 INJECTION, SOLUTION INTRAMUSCULAR; INTRAVENOUS
Status: CANCELLED | OUTPATIENT
Start: 2024-12-03 | End: 2024-12-04

## 2024-12-03 RX ORDER — FENTANYL CITRATE 50 UG/ML
INJECTION, SOLUTION INTRAMUSCULAR; INTRAVENOUS
Status: DISCONTINUED | OUTPATIENT
Start: 2024-12-03 | End: 2024-12-03 | Stop reason: SDUPTHER

## 2024-12-03 RX ORDER — HYDROCODONE BITARTRATE AND ACETAMINOPHEN 5; 325 MG/1; MG/1
1 TABLET ORAL EVERY 6 HOURS PRN
Qty: 28 TABLET | Refills: 0 | Status: SHIPPED | OUTPATIENT
Start: 2024-12-03 | End: 2024-12-10

## 2024-12-03 RX ORDER — MUPIROCIN 20 MG/G
OINTMENT TOPICAL PRN
Status: DISCONTINUED | OUTPATIENT
Start: 2024-12-03 | End: 2024-12-03 | Stop reason: ALTCHOICE

## 2024-12-03 RX ORDER — DEXAMETHASONE SODIUM PHOSPHATE 10 MG/ML
10 INJECTION, EMULSION INTRAMUSCULAR; INTRAVENOUS ONCE
Status: COMPLETED | OUTPATIENT
Start: 2024-12-03 | End: 2024-12-03

## 2024-12-03 RX ORDER — MEPERIDINE HYDROCHLORIDE 25 MG/ML
12.5 INJECTION INTRAMUSCULAR; INTRAVENOUS; SUBCUTANEOUS ONCE
Status: CANCELLED | OUTPATIENT
Start: 2024-12-03 | End: 2024-12-03

## 2024-12-03 RX ORDER — MIDAZOLAM HYDROCHLORIDE 1 MG/ML
INJECTION, SOLUTION INTRAMUSCULAR; INTRAVENOUS
Status: DISCONTINUED | OUTPATIENT
Start: 2024-12-03 | End: 2024-12-03 | Stop reason: SDUPTHER

## 2024-12-03 RX ORDER — IPRATROPIUM BROMIDE AND ALBUTEROL SULFATE 2.5; .5 MG/3ML; MG/3ML
1 SOLUTION RESPIRATORY (INHALATION)
Status: CANCELLED | OUTPATIENT
Start: 2024-12-03 | End: 2024-12-04

## 2024-12-03 RX ORDER — SODIUM CHLORIDE 9 MG/ML
INJECTION, SOLUTION INTRAVENOUS PRN
Status: DISCONTINUED | OUTPATIENT
Start: 2024-12-03 | End: 2024-12-03 | Stop reason: HOSPADM

## 2024-12-03 RX ORDER — OXYCODONE HYDROCHLORIDE 5 MG/1
5 TABLET ORAL
Status: CANCELLED | OUTPATIENT
Start: 2024-12-03 | End: 2024-12-04

## 2024-12-03 RX ORDER — ONDANSETRON 2 MG/ML
4 INJECTION INTRAMUSCULAR; INTRAVENOUS
Status: CANCELLED | OUTPATIENT
Start: 2024-12-03 | End: 2024-12-04

## 2024-12-03 RX ORDER — SODIUM CHLORIDE 0.9 % (FLUSH) 0.9 %
5-40 SYRINGE (ML) INJECTION EVERY 12 HOURS SCHEDULED
Status: DISCONTINUED | OUTPATIENT
Start: 2024-12-03 | End: 2024-12-03 | Stop reason: HOSPADM

## 2024-12-03 RX ORDER — OXYMETAZOLINE HYDROCHLORIDE 0.05 G/100ML
SPRAY NASAL PRN
Status: DISCONTINUED | OUTPATIENT
Start: 2024-12-03 | End: 2024-12-03 | Stop reason: ALTCHOICE

## 2024-12-03 RX ORDER — MUPIROCIN 20 MG/G
OINTMENT TOPICAL
Qty: 15 G | Refills: 1 | Status: SHIPPED | OUTPATIENT
Start: 2024-12-03

## 2024-12-03 RX ORDER — PROPOFOL 10 MG/ML
INJECTION, EMULSION INTRAVENOUS
Status: DISCONTINUED | OUTPATIENT
Start: 2024-12-03 | End: 2024-12-03 | Stop reason: SDUPTHER

## 2024-12-03 RX ORDER — SODIUM CHLORIDE 0.9 % (FLUSH) 0.9 %
5-40 SYRINGE (ML) INJECTION PRN
Status: CANCELLED | OUTPATIENT
Start: 2024-12-03

## 2024-12-03 RX ORDER — ACETAMINOPHEN 325 MG/1
650 TABLET ORAL ONCE
Status: CANCELLED | OUTPATIENT
Start: 2024-12-03 | End: 2024-12-03

## 2024-12-03 RX ORDER — SODIUM CHLORIDE 9 MG/ML
INJECTION, SOLUTION INTRAMUSCULAR; INTRAVENOUS; SUBCUTANEOUS PRN
Status: DISCONTINUED | OUTPATIENT
Start: 2024-12-03 | End: 2024-12-03 | Stop reason: ALTCHOICE

## 2024-12-03 RX ORDER — LIDOCAINE HYDROCHLORIDE 40 MG/ML
SOLUTION TOPICAL
Status: DISCONTINUED | OUTPATIENT
Start: 2024-12-03 | End: 2024-12-03 | Stop reason: SDUPTHER

## 2024-12-03 RX ORDER — LIDOCAINE HYDROCHLORIDE 20 MG/ML
INJECTION, SOLUTION INTRAVENOUS
Status: DISCONTINUED | OUTPATIENT
Start: 2024-12-03 | End: 2024-12-03 | Stop reason: SDUPTHER

## 2024-12-03 RX ORDER — SODIUM CHLORIDE 0.9 % (FLUSH) 0.9 %
5-40 SYRINGE (ML) INJECTION PRN
Status: DISCONTINUED | OUTPATIENT
Start: 2024-12-03 | End: 2024-12-03 | Stop reason: HOSPADM

## 2024-12-03 RX ORDER — FENTANYL CITRATE 50 UG/ML
50 INJECTION, SOLUTION INTRAMUSCULAR; INTRAVENOUS EVERY 5 MIN PRN
Status: CANCELLED | OUTPATIENT
Start: 2024-12-03

## 2024-12-03 RX ORDER — HYDRALAZINE HYDROCHLORIDE 20 MG/ML
10 INJECTION INTRAMUSCULAR; INTRAVENOUS
Status: CANCELLED | OUTPATIENT
Start: 2024-12-03

## 2024-12-03 RX ORDER — GLUCAGON 1 MG/ML
1 KIT INJECTION PRN
Status: CANCELLED | OUTPATIENT
Start: 2024-12-03

## 2024-12-03 RX ORDER — DIPHENHYDRAMINE HYDROCHLORIDE 50 MG/ML
12.5 INJECTION INTRAMUSCULAR; INTRAVENOUS
Status: CANCELLED | OUTPATIENT
Start: 2024-12-03 | End: 2024-12-04

## 2024-12-03 RX ORDER — EPINEPHRINE 1 MG/ML
INJECTION, SOLUTION, CONCENTRATE INTRAVENOUS PRN
Status: DISCONTINUED | OUTPATIENT
Start: 2024-12-03 | End: 2024-12-03 | Stop reason: ALTCHOICE

## 2024-12-03 RX ORDER — ROCURONIUM BROMIDE 10 MG/ML
INJECTION, SOLUTION INTRAVENOUS
Status: DISCONTINUED | OUTPATIENT
Start: 2024-12-03 | End: 2024-12-03 | Stop reason: SDUPTHER

## 2024-12-03 RX ORDER — SODIUM CHLORIDE 9 MG/ML
INJECTION, SOLUTION INTRAVENOUS PRN
Status: CANCELLED | OUTPATIENT
Start: 2024-12-03

## 2024-12-03 RX ORDER — SODIUM CHLORIDE 0.9 % (FLUSH) 0.9 %
5-40 SYRINGE (ML) INJECTION EVERY 12 HOURS SCHEDULED
Status: CANCELLED | OUTPATIENT
Start: 2024-12-03

## 2024-12-03 RX ADMIN — LIDOCAINE HYDROCHLORIDE 50 MG: 20 INJECTION, SOLUTION INTRAVENOUS at 08:24

## 2024-12-03 RX ADMIN — DEXAMETHASONE SODIUM PHOSPHATE 10 MG: 10 INJECTION, EMULSION INTRAMUSCULAR; INTRAVENOUS at 07:51

## 2024-12-03 RX ADMIN — ROCURONIUM BROMIDE 50 MG: 10 INJECTION INTRAVENOUS at 08:39

## 2024-12-03 RX ADMIN — FENTANYL CITRATE 50 MCG: 50 INJECTION, SOLUTION INTRAMUSCULAR; INTRAVENOUS at 09:02

## 2024-12-03 RX ADMIN — MIDAZOLAM 2 MG: 1 INJECTION INTRAMUSCULAR; INTRAVENOUS at 08:38

## 2024-12-03 RX ADMIN — TRANEXAMIC ACID 1000 MG: 100 INJECTION, SOLUTION INTRAVENOUS at 11:05

## 2024-12-03 RX ADMIN — FENTANYL CITRATE 50 MCG: 50 INJECTION, SOLUTION INTRAMUSCULAR; INTRAVENOUS at 09:05

## 2024-12-03 RX ADMIN — PROPOFOL 350 MCG/KG/MIN: 10 INJECTION, EMULSION INTRAVENOUS at 08:24

## 2024-12-03 RX ADMIN — LIDOCAINE HYDROCHLORIDE 4 ML: 40 SOLUTION TOPICAL at 08:41

## 2024-12-03 RX ADMIN — SUGAMMADEX 200 MG: 100 INJECTION, SOLUTION INTRAVENOUS at 11:06

## 2024-12-03 RX ADMIN — FENTANYL CITRATE 100 MCG: 50 INJECTION, SOLUTION INTRAMUSCULAR; INTRAVENOUS at 08:39

## 2024-12-03 RX ADMIN — ONDANSETRON 4 MG: 2 INJECTION INTRAMUSCULAR; INTRAVENOUS at 08:48

## 2024-12-03 RX ADMIN — SODIUM CHLORIDE: 9 INJECTION, SOLUTION INTRAVENOUS at 07:51

## 2024-12-03 RX ADMIN — SODIUM CHLORIDE: 9 INJECTION, SOLUTION INTRAVENOUS at 11:12

## 2024-12-03 RX ADMIN — FENTANYL CITRATE 50 MCG: 50 INJECTION, SOLUTION INTRAMUSCULAR; INTRAVENOUS at 10:17

## 2024-12-03 RX ADMIN — PIPERACILLIN SODIUM AND TAZOBACTAM SODIUM 3375 MG: 3; .375 INJECTION, POWDER, LYOPHILIZED, FOR SOLUTION INTRAVENOUS at 08:27

## 2024-12-03 RX ADMIN — FENTANYL CITRATE 50 MCG: 50 INJECTION, SOLUTION INTRAMUSCULAR; INTRAVENOUS at 09:47

## 2024-12-03 ASSESSMENT — PAIN SCALES - GENERAL
PAINLEVEL_OUTOF10: 0

## 2024-12-03 ASSESSMENT — PAIN - FUNCTIONAL ASSESSMENT: PAIN_FUNCTIONAL_ASSESSMENT: NONE - DENIES PAIN

## 2024-12-03 NOTE — OP NOTE
Operative Note      Patient: Luis Carlos Ramos  YOB: 1980  MRN: [id number]    Date of Procedure: 12/3/2024    Pre-Op Diagnosis Codes:      * Morbid obesity [E66.01]     * Intolerance of continuous positive airway pressure (CPAP) ventilation [Z78.9]     * Nasal septal deviation [J34.2]     * Nasal obstruction [J34.89]     * Hypertrophy of both inferior nasal turbinates [J34.3]    Post-Op Diagnosis: Same       Procedure(s):  DRUG INDUCED SLEEP ENDOSCOPY    Surgeon(s):  EFREN Jose    Assistant:   N/a    Anesthesia: Monitored anesthesia care    Estimated Blood Loss (mL): None    Complications: None    Specimens:   N/a    Implants:  N/a      Drains: N/a    Findings:  Infection Present At Time Of Surgery (PATOS) (choose all levels that have infection present):  No infection present  Findings:   Bilateral nasal obstruction due to nasal septal deviation, wide maxillary crest with right septal pseudo turbinate and bilateral inferior turbinate hypertrophy  Proximal soft palate with 30% collapse with snoring  Distal lateral pharyngeal wall collapse with residual palatine tonsils  Very large tongue base prolapsing to displace epiglottis posteriorly and subsume the supraglottis    Detailed Description of Procedure:   The patient was taken to the operating room awake and transferred to the operating room table.  A timeout was performed verifying the patient's identity and planned procedure.  The patient's nasal airways were sprayed with Afrin 2 puffs per nostril and lidocaine 4% 3 puffs in each nostril.  The patient was instructed to sniff backwards and propofol sedation was commenced according to FDA regulations for the DISE procedure per anesthesia provider.  Additional boluses of propofol were used to achieve the desired level of sedation following the onset of snoring.  The scope was passed into the each nasal airway to thoroughly examine it and through the right nasal airway through the middle meatus to 
Operative Note      Patient: Luis Carlos Ramos  YOB: 1980  MRN: [id number]    Date of Procedure: 12/3/2024    Pre-Op Diagnosis Codes:      * Morbid obesity [E66.01]     * Intolerance of continuous positive airway pressure (CPAP) ventilation [Z78.9]     * Nasal septal deviation [J34.2]     * Nasal obstruction [J34.89]     * Hypertrophy of both inferior nasal turbinates [J34.3]    Post-Op Diagnosis: Same       Procedure(s):  Septoplasty Inferior Turbinate Reduction Bilateral Nasal Valve Repair with Implant  DRUG INDUCED SLEEP ENDOSCOPY    Surgeon(s):  Ang Saavedra MD    Assistant:   First Assistant: Lavern Dong APRN - CNP    Anesthesia: General    Estimated Blood Loss (mL): less than 100     Complications: None    Specimens:   ID Type Source Tests Collected by Time Destination   A : Left Inferior Nasal Turbinate Tissue Nose SURGICAL PATHOLOGY Agn Saavedra MD 12/3/2024 0916    B : Right Inferior Nasal Turbinate Tissue Nose SURGICAL PATHOLOGY Ang Saavedra MD 12/3/2024 0919    C : Left Nostril Excess Skin and Subcutaneous Tissue Tissue Nose SURGICAL PATHOLOGY Ang Saavedra MD 12/3/2024 0919    D : Right Nostril Excess Skin and Subcutaneous Tissue Tissue Nose SURGICAL PATHOLOGY Ang Saavedra MD 12/3/2024 0922    E : Septal Contents Tissue Nose SURGICAL PATHOLOGY Ang Saavedra MD 12/3/2024 0922        Implants:  * No implants in log *      Drains: * No LDAs found *    Findings:  Infection Present At Time Of Surgery (PATOS) (choose all levels that have infection present):  No infection present  Other Findings: 1.  DISE demonstrated a severe distal pharyngeal and tongue base mechanism to the patient's snoring and upper airway obstruction as the basis of his [place]  2.  The patient's septal abnormality was also severe in combination with inferior turbinate hypertrophy and nasal valve collapse.  All 3 factors were surgically treated with today's operation.    Detailed 
Regular rate and rhythm, Heart sounds S1 S2 present, no murmurs, rubs or gallops

## 2024-12-03 NOTE — PROGRESS NOTES
Pt has met discharge criteria and states he is ready for discharge to home. IV removed, gauze and tape applied. Dressed in own clothes and personal belongings gathered. Discharge instructions (with opioid medication education information) given to pt and family; pt and family verbalized understanding of discharge instructions, prescriptions and follow up appointments. Pt transported to discharge lobby by [facility] staff.

## 2024-12-03 NOTE — ANESTHESIA POSTPROCEDURE EVALUATION
Department of Anesthesiology  Postprocedure Note    Patient: Luis Carlos Ramos  MRN: [id number]  YOB: 1980  Date of evaluation: 12/3/2024    Procedure Summary       Date: 12/03/24 Room / Location: [facility] OR  / [facility] OR    Anesthesia Start: 0818 Anesthesia Stop: 1119    Procedures:       Septoplasty Inferior Turbinate Reduction Bilateral Nasal Valve Repair with Implant (Nose)      DRUG INDUCED SLEEP ENDOSCOPY Diagnosis:       Morbid obesity      Intolerance of continuous positive airway pressure (CPAP) ventilation      Nasal septal deviation      Nasal obstruction      Hypertrophy of both inferior nasal turbinates      (Morbid obesity (HCC) [E66.01])      (Intolerance of continuous positive airway pressure (CPAP) ventilation [Z78.9])      (Nasal septal deviation [J34.2])      (Nasal obstruction [J34.89])      (Hypertrophy of both inferior nasal turbinates [J34.3])    Surgeons: Ang Saavedra MD Responsible Provider: Carroll Danielle DO    Anesthesia Type: general ASA Status: 3            Anesthesia Type: No value filed.    Marce Phase I: Marce Score: 10    Marce Phase II: Marce Score: 10    Anesthesia Post Evaluation    Patient location during evaluation: PACU  Patient participation: complete - patient participated  Level of consciousness: awake  Airway patency: patent  Nausea & Vomiting: no nausea  Cardiovascular status: hemodynamically stable  Respiratory status: acceptable  Hydration status: stable  Pain management: adequate    No notable events documented.

## 2024-12-03 NOTE — PROGRESS NOTES
Pt returned to \[facility]\"" room 18. Vitals and assessment as charted. 0.9 infusing, @850ml to count from PACU. Pt has crackers and water. Family at the bedside. Pt and family verbalized understanding of discharge criteria and call light use. Call light in reach.

## 2024-12-03 NOTE — PROGRESS NOTES
Patient oriented to Same Day department and admitted to Same Day Surgery room 18.   Patient verbalized approval for first name, last initial with physician name on unit whiteboard.     Plan of care reviewed with patient.   Patient room whiteboard filled out and discussed with patient and responsible adult.   Patient and responsible adult offered Same Day Welcome Packet to review.    Call light in reach.   Bed in lowest position, locked, with one bed rail up.   SCDs and warming blanket in place.  Appropriate arm bands on patient.   Bathroom offered.   All questions and concerns of patient addressed.        Meds to Beds:   Patient informed of [facility] program during admission. Patient is agreeable to program.   Contact information for the pharmacy and the Meds to Beds program:   Name: Luis Carlos Ramos   Relationship to patient:patient   Phone number: [phone]

## 2024-12-03 NOTE — PROGRESS NOTES
1117  - pt arrives to pacu, respirations unlabored on room air, pt denies pain, VSS    1130 - pt resting comfortably, pt denies pain    1147 - pt meets criteria for discharge from pacu at this time, pt transported to [facility] in stable condition

## 2024-12-03 NOTE — H&P
Adapted from prior ENT note:    Nasal obstruction; nasal septal deviation; inferior turbinate hypertrophy  Obstructive sleep apnea; CPAP intolerance  No new symptoms    Past Medical History:   Diagnosis Date    Azoospermia     Hypogonadism        Past Surgical History:   Procedure Laterality Date    CHOLECYSTECTOMY      stent placement as well.    CT GUIDED CHEST TUBE  2/1/2023    CT GUIDED CHEST TUBE 2/1/2023 [facility] CT SCAN       No Known Allergies    Current Facility-Administered Medications   Medication Dose Route Frequency Provider Last Rate Last Admin    piperacillin-tazobactam (ZOSYN) 3,375 mg in sodium chloride 0.9 % 50 mL IVPB (mini-bag)  3,375 mg IntraVENous Once Lavern Dong APRN - CNP        dexAMETHasone (PF) (DECADRON) injection 10 mg  10 mg IntraVENous Once Lavern Dong APRN - CNP        sodium chloride flush 0.9 % injection 5-40 mL  5-40 mL IntraVENous 2 times per day Lavern Dong APRN - CNP        sodium chloride flush 0.9 % injection 5-40 mL  5-40 mL IntraVENous PRN Lavern Dong APRN - CNP        0.9 % sodium chloride infusion   IntraVENous PRN Lavern Dong APRN - CNP           Current vitals  /69   Pulse 77   Temp 97.1 °F (36.2 °C) (Temporal)   Resp 18   Ht 1.778 m (5' 10\")   Wt 120.3 kg (265 lb 2 oz)   SpO2 100%   BMI 38.04 kg/m²     Proceed with original surgical plan:  Septoplasty Inferior Turbinate Reduction Bilateral Nasal Valve Repair with Implant; Drug induced sleep endoscopy    Electronically signed by EFREN Bernal CNP on 12/3/2024 at 7:29 AM      -----------------------------------------  -----------------------------------------      [facility] PHYS[facility] EAR, NOSE AND THROAT  [address] [address]  Dept: [phone]  Dept Fax: [phone]  Loc: [phone]     Luis Carlos Ramos is a 44 y.o. male who was referred by Tanika Humphries APRN -* for:       Chief Complaint

## 2024-12-03 NOTE — ANESTHESIA PRE PROCEDURE
hyperlipidemia E78.2   • Morbid obesity E66.01   • [place] (obstructive sleep apnea) G47.33   • Intolerance of continuous positive airway pressure (CPAP) ventilation Z78.9   • Nasal septal deviation J34.2   • Nasal obstruction J34.89   • Hypertrophy of both inferior nasal turbinates J34.3   • Nasal valve collapse J34.829       Past Medical History:        Diagnosis Date   • Azoospermia    • Hypogonadism        Past Surgical History:        Procedure Laterality Date   • CHOLECYSTECTOMY      stent placement as well.   • CT GUIDED CHEST TUBE  2/1/2023    CT GUIDED CHEST TUBE 2/1/2023 [facility] CT SCAN       Social History:    Social History     Tobacco Use   • Smoking status: Never   • Smokeless tobacco: Never   • Tobacco comments:     Never smoker   Substance Use Topics   • Alcohol use: No                                Counseling given: Not Answered  Tobacco comments: Never smoker      Vital Signs (Current):   Vitals:    12/03/24 0715   BP: 122/69   Pulse: 77   Resp: 18   Temp: 97.1 °F (36.2 °C)   TempSrc: Temporal   SpO2: 100%   Weight: 120.3 kg (265 lb 2 oz)   Height: 1.778 m (5' 10\")                                              BP Readings from Last 3 Encounters:   12/03/24 122/69   10/29/24 124/68   09/04/24 124/80       NPO Status: Time of last liquid consumption: 2330                        Time of last solid consumption: 2300                        Date of last liquid consumption: 12/02/24                        Date of last solid food consumption: 12/02/24    BMI:   Wt Readings from Last 3 Encounters:   12/03/24 120.3 kg (265 lb 2 oz)   10/29/24 114.5 kg (252 lb 6.4 oz)   09/04/24 111.6 kg (246 lb)     Body mass index is 38.04 kg/m².    CBC:   Lab Results   Component Value Date/Time    WBC 6.1 11/26/2024 08:26 AM    RBC 4.84 11/26/2024 08:26 AM    RBC 5.09 07/19/2017 09:55 AM    HGB 13.7 11/26/2024 08:26 AM    HCT 40.0 11/26/2024 08:26 AM    MCV 82.6 11/26/2024 08:26 AM    RDW 12.7 01/30/2023 04:02 PM

## 2024-12-11 ENCOUNTER — LAB (OUTPATIENT)
Dept: LAB | Age: 44
End: 2024-12-11

## 2024-12-11 ENCOUNTER — OFFICE VISIT (OUTPATIENT)
Dept: ENT CLINIC | Age: 44
End: 2024-12-11
Payer: COMMERCIAL

## 2024-12-11 VITALS
SYSTOLIC BLOOD PRESSURE: 132 MMHG | WEIGHT: 267.5 LBS | HEART RATE: 88 BPM | RESPIRATION RATE: 20 BRPM | DIASTOLIC BLOOD PRESSURE: 80 MMHG | OXYGEN SATURATION: 99 % | BODY MASS INDEX: 38.3 KG/M2 | HEIGHT: 70 IN | TEMPERATURE: 97.6 F

## 2024-12-11 DIAGNOSIS — Z98.890 STATUS POST NASAL SEPTOPLASTY: Primary | ICD-10-CM

## 2024-12-11 DIAGNOSIS — G47.33 OSA (OBSTRUCTIVE SLEEP APNEA): ICD-10-CM

## 2024-12-11 DIAGNOSIS — J34.89 NASAL OBSTRUCTION: ICD-10-CM

## 2024-12-11 PROCEDURE — 99024 POSTOP FOLLOW-UP VISIT: CPT | Performed by: OTOLARYNGOLOGY

## 2024-12-11 PROCEDURE — 31237 NSL/SINS NDSC SURG BX POLYPC: CPT | Performed by: OTOLARYNGOLOGY

## 2024-12-11 NOTE — PROGRESS NOTES
[facility] [place] [facility]  [facility] EAR, NOSE AND THROAT  [address]  [address]  [facility] [address]  Dept: [phone]  Dept Fax: [phone]  Loc: [phone]    Luis Carlos Ramos is a 44 y.o. male who was referred by No ref. provider found for:  Chief Complaint   Patient presents with    Post-Op Check     Patient is post op DISE, septo, Inf turbs, nasal valve 12/03. Patient states that he feels fullness and plugged on both sides L side is worse. Patient has not been saline rinses regularly.        HPI:     Luis Carlos Ramos is a 44 y.o. male   History of Present Illness  The patient presents for his first postoperative visit after septoplasty and turbinate reduction.    He reports a satisfactory recovery from the surgical procedures, although he experiences mild fatigue and sinus pressure, which he attributes to the nasal blockage. He has not yet initiated the use of the prescribed antibiotic ointment. 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 He expresses concern about the availability of masks at his workplace and inquires about the possibility of purchasing a regular mask.    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        History:     No Known Allergies  Current Outpatient Medications   Medication Sig Dispense Refill    ciprofloxacin (CIPRO) 500 MG tablet Take 1 tablet by mouth 2 times daily for 10 days 20 tablet 0    fluticasone (FLONASE) 50 MCG/ACT nasal spray 1 spray by Each Nostril route in the morning and at bedtime 16 g 2    Testosterone (ANDROGEL) 20.25 MG/ACT (1.62%) GEL gel Place 4 actuation onto the skin daily for 180 days. Max Daily Amount: 5,000 mg 150 g 5    ibuprofen (ADVIL;MOTRIN) 200 MG tablet Take 1 tablet by mouth every 6 hours as needed for Pain      mupirocin

## 2024-12-23 ENCOUNTER — OFFICE VISIT (OUTPATIENT)
Dept: ENT CLINIC | Age: 44
End: 2024-12-23
Payer: COMMERCIAL

## 2024-12-23 VITALS
HEART RATE: 103 BPM | DIASTOLIC BLOOD PRESSURE: 70 MMHG | TEMPERATURE: 98.4 F | OXYGEN SATURATION: 99 % | HEIGHT: 70 IN | BODY MASS INDEX: 38.93 KG/M2 | SYSTOLIC BLOOD PRESSURE: 140 MMHG | RESPIRATION RATE: 18 BRPM | WEIGHT: 271.9 LBS

## 2024-12-23 DIAGNOSIS — Z78.9 INTOLERANCE OF CONTINUOUS POSITIVE AIRWAY PRESSURE (CPAP) VENTILATION: ICD-10-CM

## 2024-12-23 DIAGNOSIS — E66.01 MORBID OBESITY: ICD-10-CM

## 2024-12-23 DIAGNOSIS — Z98.890 STATUS POST NASAL SEPTOPLASTY: Primary | ICD-10-CM

## 2024-12-23 DIAGNOSIS — G47.33 OSA (OBSTRUCTIVE SLEEP APNEA): ICD-10-CM

## 2024-12-23 DIAGNOSIS — J34.89 NASAL OBSTRUCTION: ICD-10-CM

## 2024-12-23 PROCEDURE — 31237 NSL/SINS NDSC SURG BX POLYPC: CPT | Performed by: OTOLARYNGOLOGY

## 2024-12-23 PROCEDURE — 99024 POSTOP FOLLOW-UP VISIT: CPT | Performed by: OTOLARYNGOLOGY

## 2024-12-23 NOTE — PROGRESS NOTES
Inspire device would completely resolve. In his case, the Inspire device would also eliminate the need for palate surgery, which is typically associated with significant discomfort. He was encouraged to lose weight, with a target BMI of 35, to become a candidate for the Inspire device.    Follow-up  The patient will follow up in 6 months.    PROCEDURE  The patient underwent septoplasty and turbinate reduction.       Return in about 6 months (around 6/23/2025) for Follow-up evaluation and to plan for Inspire if he has lost significant amounts of weight..         The patient (or guardian, if applicable) and other individuals in attendance with the patient were advised that Artificial Intelligence will be utilized during this visit to record, process the conversation to generate a clinical note, and support improvement of the AI technology. The patient (or guardian, if applicable) and other individuals in attendance at the appointment consented to the use of AI, including the recording.         **This report has been created using voice recognition software. It may contain minor errors which are inherent in voice recognition technology.**

## 2024-12-30 ENCOUNTER — TELEPHONE (OUTPATIENT)
Dept: UROLOGY | Age: 44
End: 2024-12-30

## 2024-12-30 NOTE — TELEPHONE ENCOUNTER
Patient has a follow up on 01/15/2025. He had labs completed early. Do you want anything repeated prior to appointment?

## 2025-01-15 ENCOUNTER — OFFICE VISIT (OUTPATIENT)
Dept: UROLOGY | Age: 45
End: 2025-01-15
Payer: COMMERCIAL

## 2025-01-15 VITALS — HEIGHT: 70 IN | BODY MASS INDEX: 38.8 KG/M2 | RESPIRATION RATE: 20 BRPM | WEIGHT: 271 LBS

## 2025-01-15 DIAGNOSIS — Z12.5 PROSTATE CANCER SCREENING: ICD-10-CM

## 2025-01-15 DIAGNOSIS — E29.1 HYPOGONADISM IN MALE: Primary | ICD-10-CM

## 2025-01-15 DIAGNOSIS — R68.82 LOW LIBIDO: ICD-10-CM

## 2025-01-15 PROCEDURE — 1036F TOBACCO NON-USER: CPT

## 2025-01-15 PROCEDURE — G8427 DOCREV CUR MEDS BY ELIG CLIN: HCPCS

## 2025-01-15 PROCEDURE — G8417 CALC BMI ABV UP PARAM F/U: HCPCS

## 2025-01-15 PROCEDURE — 99214 OFFICE O/P EST MOD 30 MIN: CPT

## 2025-01-15 RX ORDER — TESTOSTERONE UNDECANOATE 237 MG/1
474 CAPSULE, LIQUID FILLED ORAL DAILY
Qty: 180 CAPSULE | Refills: 1 | Status: SHIPPED | OUTPATIENT
Start: 2025-01-15

## 2025-01-15 NOTE — PROGRESS NOTES
[facility] [place] [facility]  [facility] UROLOGY  [address]  [address]  [facility] [address]  Dept: [phone]  Loc: [phone]  Visit Date: 1/15/2025    HPI  Luis Carlos Ramos is a 44 y.o. male that presents to the urology clinic for hypogonadism in male and low testosterone.     On TRT for years. Currently managed on Androgel 1.62%, 4 actuations daily. Subtherapeutic levels... 98 on most recent check. Once again symptomatic for low energy, decreased libido, and declining erections + body habitus.     Failed IM injections prior to Gel 2/2 to soreness and non-compliance.    No additional URO complaints.    Most recent PSA: 0.36 (07/14/23)      Last total testosterone:  Lab Results   Component Value Date    TESTOSTERONE 98 (L) 11/26/2024         Last BUN and creatinine:  Lab Results   Component Value Date    BUN 15 11/26/2024     Lab Results   Component Value Date    CREATININE 0.7 11/26/2024           PAST MEDICAL, FAMILY AND SOCIAL HISTORY UPDATE:  Past Medical History:   Diagnosis Date    Azoospermia     Hypogonadism      Past Surgical History:   Procedure Laterality Date    CHOLECYSTECTOMY      stent placement as well.    CT GUIDED CHEST TUBE  2/1/2023    CT GUIDED CHEST TUBE 2/1/2023 [facility] CT SCAN    EXAMINATION UNDER ANESTHESIA N/A 12/3/2024    DRUG INDUCED SLEEP ENDOSCOPY performed by Ang Saavedra MD at [facility] OR    NASAL SURG PROC UNLISTED N/A 12/3/2024    Septoplasty Inferior Turbinate Reduction Bilateral Nasal Valve Repair with Implant performed by Ang Saavedra MD at [facility] OR     Family History   Problem Relation Age of Onset    Cancer Mother         hodgkins    No Known Problems Father      Outpatient Medications Marked as Taking for the 1/15/25 encounter (Office Visit) with Kyree Mariscal PA-C   Medication Sig Dispense Refill    fluticasone (FLONASE) 50 MCG/ACT nasal spray 1 spray by Each Nostril route in the morning and at bedtime 16 g 2    Testosterone (ANDROGEL)

## 2025-01-23 ENCOUNTER — TELEPHONE (OUTPATIENT)
Dept: UROLOGY | Age: 45
End: 2025-01-23

## 2025-01-23 DIAGNOSIS — E29.1 HYPOGONADISM IN MALE: Primary | ICD-10-CM

## 2025-01-23 DIAGNOSIS — R68.82 LOW LIBIDO: ICD-10-CM

## 2025-01-23 NOTE — TELEPHONE ENCOUNTER
Patient is calling about a new prescription that provider wanted to start him on, Jatenzo. He said that he wanted to see if we had heard anything more from his insurance on if it was approved. Please advise

## 2025-01-30 NOTE — TELEPHONE ENCOUNTER
[employer] has sent an appeal to [employer].    [facility] to inform to call office if any questions.

## 2025-02-11 NOTE — TELEPHONE ENCOUNTER
Seeing as patient has been on them before and knows how to inject, I am okay to order them for him if he is in agreement with this treatment plan.

## 2025-02-11 NOTE — TELEPHONE ENCOUNTER
Yes, will likely need to go back on IM injections to reach therapeutic levels given denial of alternative options and failure of maximum dose of the gel.

## 2025-02-12 RX ORDER — NEEDLES, DISPOSABLE 25GX5/8"
1 NEEDLE, DISPOSABLE MISCELLANEOUS
Qty: 10 EACH | Refills: 5 | Status: SHIPPED | OUTPATIENT
Start: 2025-02-12

## 2025-02-12 RX ORDER — TESTOSTERONE CYPIONATE 200 MG/ML
200 INJECTION, SOLUTION INTRAMUSCULAR
Qty: 4 ML | Refills: 2 | Status: SHIPPED | OUTPATIENT
Start: 2025-02-12 | End: 2025-07-17

## 2025-02-12 NOTE — TELEPHONE ENCOUNTER
Testosterone + Needles and Syringes sent. Have patient get his labs drawn in 3 months (orders in place).

## 2025-02-13 NOTE — TELEPHONE ENCOUNTER
Prior authorization approved  Payer: [employer] Patient's Payer Case ID: [id number]    [phone]  Note from payer: Your PA request for [id number] was approved for 365 days. The PA# assigned is [id number].  Approval Details    Authorized from February 12, 2025 to February 11, 2026

## 2025-07-09 ENCOUNTER — LAB (OUTPATIENT)
Dept: LAB | Age: 45
End: 2025-07-09

## 2025-07-09 DIAGNOSIS — E29.1 HYPOGONADISM IN MALE: ICD-10-CM

## 2025-07-09 DIAGNOSIS — R68.82 LOW LIBIDO: ICD-10-CM

## 2025-07-09 LAB
ALBUMIN SERPL BCG-MCNC: 4.7 G/DL (ref 3.4–4.9)
ALP SERPL-CCNC: 87 U/L (ref 40–129)
ALT SERPL W/O P-5'-P-CCNC: 54 U/L (ref 10–50)
AST SERPL-CCNC: 33 U/L (ref 10–50)
BILIRUB CONJ SERPL-MCNC: 0.3 MG/DL (ref 0–0.2)
BILIRUB SERPL-MCNC: 0.7 MG/DL (ref 0.3–1.2)
HCT VFR BLD AUTO: 40.9 % (ref 42–52)
HGB BLD-MCNC: 13.9 GM/DL (ref 14–18)
PROT SERPL-MCNC: 8.1 G/DL (ref 6.4–8.3)

## 2025-07-10 LAB — TESTOST SERPL-MCNC: 168 NG/DL (ref 249–836)

## 2025-07-31 ENCOUNTER — OFFICE VISIT (OUTPATIENT)
Dept: UROLOGY | Age: 45
End: 2025-07-31
Payer: COMMERCIAL

## 2025-07-31 VITALS — RESPIRATION RATE: 22 BRPM | HEIGHT: 70 IN | WEIGHT: 299 LBS | BODY MASS INDEX: 42.8 KG/M2

## 2025-07-31 DIAGNOSIS — R68.82 LOW LIBIDO: ICD-10-CM

## 2025-07-31 DIAGNOSIS — Z12.5 PROSTATE CANCER SCREENING: ICD-10-CM

## 2025-07-31 DIAGNOSIS — E29.1 HYPOGONADISM IN MALE: Primary | ICD-10-CM

## 2025-07-31 PROCEDURE — 1036F TOBACCO NON-USER: CPT

## 2025-07-31 PROCEDURE — G8417 CALC BMI ABV UP PARAM F/U: HCPCS

## 2025-07-31 PROCEDURE — 99214 OFFICE O/P EST MOD 30 MIN: CPT

## 2025-07-31 PROCEDURE — G8428 CUR MEDS NOT DOCUMENT: HCPCS

## 2025-07-31 RX ORDER — TESTOSTERONE CYPIONATE 200 MG/ML
100 INJECTION, SOLUTION INTRAMUSCULAR
Qty: 4 ML | Refills: 2 | Status: SHIPPED | OUTPATIENT
Start: 2025-07-31 | End: 2026-01-09

## 2025-07-31 RX ORDER — NEEDLES, SAFETY 22GX1 1/2"
1 NEEDLE, DISPOSABLE MISCELLANEOUS
Qty: 10 EACH | Refills: 5 | Status: SHIPPED | OUTPATIENT
Start: 2025-07-31

## 2025-07-31 NOTE — PROGRESS NOTES
[facility] [place] [facility]  [facility] UROLOGY  [address]  [address]  [facility] [address]  Dept: [phone]  Loc: [phone]  Visit Date: 7/31/2025    HPI  Luis Carlos Ramos is a 45 y.o. male that presents to the urology clinic for hypogonadism in male and low testosterone.     On TRT for years. Initially managed on Androgel 1.62%, 4 actuations daily. Subtherapeutic levels... 98 on most recent check. Once again symptomatic for low energy, decreased libido, and declining erections + body habitus.     Switched to IM injections via Testosterone Cypionate 200 mg/mL, 1 mL IM every 14 days. Improved symptoms vs gel but still not at goal. T of 168...    No additional URO complaints.    Most recent PSA: 0.36 (07/14/23)      Last total testosterone:  Lab Results   Component Value Date    TESTOSTERONE 168 (L) 07/09/2025         Last BUN and creatinine:  Lab Results   Component Value Date    BUN 15 11/26/2024     Lab Results   Component Value Date    CREATININE 0.7 11/26/2024           PAST MEDICAL, FAMILY AND SOCIAL HISTORY UPDATE:  Past Medical History:   Diagnosis Date    Azoospermia     Hypogonadism      Past Surgical History:   Procedure Laterality Date    CHOLECYSTECTOMY      stent placement as well.    CT GUIDED CHEST TUBE  2/1/2023    CT GUIDED CHEST TUBE 2/1/2023 [facility] CT SCAN    EXAMINATION UNDER ANESTHESIA N/A 12/3/2024    DRUG INDUCED SLEEP ENDOSCOPY performed by Ang Saavedra MD at [facility] OR    NASAL SURG PROC UNLISTED N/A 12/3/2024    Septoplasty Inferior Turbinate Reduction Bilateral Nasal Valve Repair with Implant performed by Ang Saavedra MD at [facility] OR     Family History   Problem Relation Age of Onset    Cancer Mother         hodgkins    No Known Problems Father      Outpatient Medications Marked as Taking for the 7/31/25 encounter (Office Visit) with Kyree Mariscal PA-C   Medication Sig Dispense Refill    SYRINGE-NEEDLE, DISP, 3 ML (BD LUER-LOCK SYRINGE) 18G X 1-1/2\"

## 2025-08-11 ENCOUNTER — OFFICE VISIT (OUTPATIENT)
Dept: ENT CLINIC | Age: 45
End: 2025-08-11
Payer: COMMERCIAL

## 2025-08-11 VITALS
HEART RATE: 93 BPM | WEIGHT: 297.4 LBS | SYSTOLIC BLOOD PRESSURE: 136 MMHG | OXYGEN SATURATION: 97 % | BODY MASS INDEX: 42.58 KG/M2 | RESPIRATION RATE: 18 BRPM | HEIGHT: 70 IN | TEMPERATURE: 98.1 F | DIASTOLIC BLOOD PRESSURE: 78 MMHG

## 2025-08-11 DIAGNOSIS — Z98.890 STATUS POST NASAL SEPTOPLASTY: ICD-10-CM

## 2025-08-11 DIAGNOSIS — Z78.9 INTOLERANCE OF CONTINUOUS POSITIVE AIRWAY PRESSURE (CPAP) VENTILATION: ICD-10-CM

## 2025-08-11 DIAGNOSIS — G47.33 OSA (OBSTRUCTIVE SLEEP APNEA): Primary | ICD-10-CM

## 2025-08-11 DIAGNOSIS — E66.01 MORBID OBESITY (HCC): ICD-10-CM

## 2025-08-11 PROCEDURE — 99212 OFFICE O/P EST SF 10 MIN: CPT | Performed by: OTOLARYNGOLOGY

## 2025-08-11 PROCEDURE — G8427 DOCREV CUR MEDS BY ELIG CLIN: HCPCS | Performed by: OTOLARYNGOLOGY

## 2025-08-11 PROCEDURE — 1036F TOBACCO NON-USER: CPT | Performed by: OTOLARYNGOLOGY

## 2025-08-11 PROCEDURE — G8417 CALC BMI ABV UP PARAM F/U: HCPCS | Performed by: OTOLARYNGOLOGY

## (undated) DEVICE — BLADE,CARBON-STEEL,15,STRL,DISPOSABLE,TB: Brand: MEDLINE

## (undated) DEVICE — ENT: Brand: MEDLINE INDUSTRIES, INC.

## (undated) DEVICE — SPLINT 1524050 5PK PAIR DOYLE II AIRWAY: Brand: DOYLE II ™

## (undated) DEVICE — FLEXIBLE ENDOSCOPE AND SPECIMEN SAMPLING SYSTEM: Brand: ASCOPE™ 5 BRONCHO 4.2/2.2 SAMPLER SET

## (undated) DEVICE — ENTACT SEPTAL STAPLER 3 PACK: Brand: ENT SINUS

## (undated) DEVICE — KIT,ANTI FOG,W/SPONGE & FLUID,SOFT PACK: Brand: MEDLINE

## (undated) DEVICE — APPLICATOR MEDICATED 10.5 CC SOLUTION CLR STRL CHLORAPREP

## (undated) DEVICE — SUTURE MONOCRYL SZ 4 0 L18IN ABSRB UD P 3 3 8 CIR REV CUT NDL MCP494G

## (undated) DEVICE — GLOVE SURG SZ 7.5 L11.73IN FNGR THK9.8MIL STRW LTX POLYMER

## (undated) DEVICE — SUTURE PROL SZ 2-0 L18IN NONABSORBABLE BLU FS L26MM 3/8 CIR 8685H

## (undated) DEVICE — SYRINGE IRRIG 60ML SFT PLIABLE BLB EZ TO GRP 1 HND USE W/

## (undated) DEVICE — TUBING, SUCTION, 1/4" X 20', STRAIGHT: Brand: MEDLINE INDUSTRIES, INC.

## (undated) DEVICE — PACK-MAJOR